# Patient Record
Sex: FEMALE | Race: WHITE | NOT HISPANIC OR LATINO | Employment: OTHER | URBAN - METROPOLITAN AREA
[De-identification: names, ages, dates, MRNs, and addresses within clinical notes are randomized per-mention and may not be internally consistent; named-entity substitution may affect disease eponyms.]

---

## 2017-10-16 ENCOUNTER — OFFICE VISIT (OUTPATIENT)
Dept: MEDICAL GROUP | Facility: MEDICAL CENTER | Age: 77
End: 2017-10-16
Payer: MEDICARE

## 2017-10-16 VITALS
BODY MASS INDEX: 31.65 KG/M2 | RESPIRATION RATE: 18 BRPM | HEIGHT: 62 IN | WEIGHT: 172 LBS | SYSTOLIC BLOOD PRESSURE: 118 MMHG | DIASTOLIC BLOOD PRESSURE: 70 MMHG | OXYGEN SATURATION: 95 % | TEMPERATURE: 97.8 F | HEART RATE: 72 BPM

## 2017-10-16 DIAGNOSIS — J40 BRONCHITIS: Primary | ICD-10-CM

## 2017-10-16 DIAGNOSIS — I10 ESSENTIAL HYPERTENSION: ICD-10-CM

## 2017-10-16 DIAGNOSIS — Z00.00 HEALTHCARE MAINTENANCE: ICD-10-CM

## 2017-10-16 DIAGNOSIS — Z23 NEED FOR VACCINATION: ICD-10-CM

## 2017-10-16 DIAGNOSIS — Z12.11 SCREENING FOR COLON CANCER: ICD-10-CM

## 2017-10-16 PROCEDURE — 99203 OFFICE O/P NEW LOW 30 MIN: CPT | Mod: 25 | Performed by: FAMILY MEDICINE

## 2017-10-16 PROCEDURE — G0009 ADMIN PNEUMOCOCCAL VACCINE: HCPCS | Performed by: FAMILY MEDICINE

## 2017-10-16 PROCEDURE — 90670 PCV13 VACCINE IM: CPT | Performed by: FAMILY MEDICINE

## 2017-10-16 RX ORDER — PREDNISONE 10 MG/1
10 TABLET ORAL DAILY
Qty: 10 TAB | Refills: 0 | Status: SHIPPED | OUTPATIENT
Start: 2017-10-16 | End: 2017-10-16 | Stop reason: SDUPTHER

## 2017-10-16 RX ORDER — ALBUTEROL SULFATE 90 UG/1
2 AEROSOL, METERED RESPIRATORY (INHALATION) EVERY 6 HOURS PRN
Qty: 8.5 G | Refills: 0 | Status: SHIPPED
Start: 2017-10-16 | End: 2019-12-11

## 2017-10-16 RX ORDER — INFLUENZA A VIRUS A/MICHIGAN/45/2015 X-275 (H1N1) ANTIGEN (FORMALDEHYDE INACTIVATED), INFLUENZA A VIRUS A/SINGAPORE/INFIMH-16-0019/2016 IVR-186 (H3N2) ANTIGEN (FORMALDEHYDE INACTIVATED), AND INFLUENZA B VIRUS B/MARYLAND/15/2016 BX-69A (A B/COLORADO/6/2017-LIKE VIRUS) ANTIGEN (FORMALDEHYDE INACTIVATED) 60; 60; 60 UG/.5ML; UG/.5ML; UG/.5ML
INJECTION, SUSPENSION INTRAMUSCULAR
Refills: 0 | COMMUNITY
Start: 2017-09-17 | End: 2019-12-11

## 2017-10-16 RX ORDER — AZITHROMYCIN 250 MG/1
TABLET, FILM COATED ORAL
Qty: 6 TAB | Refills: 0 | Status: SHIPPED | OUTPATIENT
Start: 2017-10-16 | End: 2019-12-11

## 2017-10-16 RX ORDER — ATENOLOL 50 MG/1
50 TABLET ORAL
Refills: 6 | COMMUNITY
Start: 2017-09-13 | End: 2017-10-16 | Stop reason: SDUPTHER

## 2017-10-16 RX ORDER — PREDNISONE 10 MG/1
10 TABLET ORAL DAILY
Qty: 10 TAB | Refills: 0 | Status: SHIPPED | OUTPATIENT
Start: 2017-10-16 | End: 2017-10-26

## 2017-10-16 RX ORDER — ATENOLOL 50 MG/1
50 TABLET ORAL
Qty: 90 TAB | Refills: 3 | Status: SHIPPED
Start: 2017-10-16 | End: 2019-12-11

## 2017-10-16 RX ORDER — CODEINE PHOSPHATE AND GUAIFENESIN 10; 100 MG/5ML; MG/5ML
5 SOLUTION ORAL EVERY 4 HOURS PRN
Qty: 420 ML | Refills: 0 | Status: SHIPPED | OUTPATIENT
Start: 2017-10-16 | End: 2019-12-11

## 2017-10-16 ASSESSMENT — PATIENT HEALTH QUESTIONNAIRE - PHQ9: CLINICAL INTERPRETATION OF PHQ2 SCORE: 0

## 2017-10-16 NOTE — ASSESSMENT & PLAN NOTE
Very well controlled  I encourage her to return to clinic q6 mo with labs, minimum 1x per year   Continue BB

## 2017-10-16 NOTE — ASSESSMENT & PLAN NOTE
No e/o bacterial infection on examination today.  I would like her to start with low dose oral prednisone, cough suppressant, SATURNINO, hydration and humidifier.  SNAP prescription provided.  Return if symptoms worsen, fail to improve or if new symptoms develop.

## 2017-10-16 NOTE — ASSESSMENT & PLAN NOTE
"She declines mammo  She will do fit testing for colon cancer screening  She believes she had pneumonia 23 at age 63 but will do 13 today  She is up to date bone density scan states she had one done recently and her bone density was \"fantastic!\" declines futher bone density screening at this time  States she would like to be \"minimal' with her healthcare  Had flu done this year  "

## 2017-10-16 NOTE — PROGRESS NOTES
This medical record contains text that has been entered with the assistance of computer voice recognition and dictation software.  Therefore, it may contain unintended errors in text, spelling, punctuation, or grammar        Chief Complaint   Patient presents with   • Establish Care     patient states she is here to establish care   • URI     patient states she has been sick x3 weeks       Elvie Otero is a 77 y.o. 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  She has relatives in [facility], her mother was 95 when she passed away, her aunt is 98 and still alive.      She developed cough congestion 7-10 days ago, congestion has improved but the dry naggy cough has persisted.  Cough is worse at night and improves during the day.  Feels that cough is involuntary , triggered by laughing or deep breathing.    No fever/chill  No h/o reactive airway disease  No sinus pain   No headache  No eye changes  No shortness of breath  No swelling   No rash    Current Outpatient Prescriptions   Medication Sig Dispense Refill   • guaifenesin-codeine (ROBITUSSIN AC) Solution oral solution Take 5 mL by mouth every four hours as needed for Cough. 420 mL 0   • albuterol (PROAIR HFA) 108 (90 Base) MCG/ACT Aero Soln inhalation aerosol Inhale 2 Puffs by mouth every 6 hours as  "needed for Shortness of Breath. 8.5 g 0   • azithromycin (ZITHROMAX) 250 MG Tab Take 2 tabs on day one, then 1 tab on days 2-5 6 Tab 0   • predniSONE (DELTASONE) 10 MG Tab Take 1 Tab by mouth every day for 10 doses. 10 Tab 0   • atenolol (TENORMIN) 50 MG Tab Take 1 Tab by mouth every day. 90 Tab 3   • FLUZONE HIGH-DOSE 0.5 ML Suspension Prefilled Syringe injection ADM 0.5ML IM UTD  0     No current facility-administered medications for this visit.      Patient Active Problem List    Diagnosis Date Noted   • Bronchitis 10/16/2017   • Essential hypertension 10/16/2017   • Healthcare maintenance 10/16/2017     Past Surgical History:   Procedure Laterality Date   • RHYTIDECTOMY  10/15/2012    Performed by Nelsy Sun M.D. at SURGERY [facility]   • BLEPHAROPLASTY  10/15/2012    Performed by Nelsy Sun M.D. at SURGERY [facility]   • CHOLECYSTECTOMY  2004   • HYSTERECTOMY LAPAROSCOPY  1975      Social History   Substance Use Topics   • Smoking status: Never Smoker   • Smokeless tobacco: Never Used   • Alcohol use Yes      Comment: 1 per week     History reviewed. No pertinent family history.        ROS      All other systems reviewed and are negative     Objective:     Blood pressure 118/70, pulse 72, temperature 36.6 °C (97.8 °F), resp. rate 18, height 1.575 m (5' 2\"), weight 78 kg (172 lb), SpO2 95 %. Body mass index is 31.46 kg/m².  Physical Exam:    GEN: alert and oriented, no apparent distress  HEENT: NCAT  EYES: PERRLA, EOMIT, sclera white  NOSE: boggy inf nasal turbinates with clear nasal discharge.    EARS: TM's normal bilaterally, no redness effusion.  NECK: shotty LAD, supple, no rigidity  THROAT: swelling and redness of the pharyngeal arches no pus or exudates, uvula midline   LUNGS: clear to ascultation bilaterally, no e/o consolidation.  Speaking in full sentences, not in respiratory distress.   HEART: RRR, no MRG  MSK: limbs warm and well perfused            Assessment and " "Plan:   The following treatment plan was discussed        Problem List Items Addressed This Visit     Bronchitis - Primary     No e/o bacterial infection on examination today.  I would like her to start with low dose oral prednisone, cough suppressant, SATURNINO, hydration and humidifier.  SNAP prescription provided.  Return if symptoms worsen, fail to improve or if new symptoms develop.                Essential hypertension     Very well controlled  I encourage her to return to clinic q6 mo with labs, minimum 1x per year   Continue BB           Relevant Medications    atenolol (TENORMIN) 50 MG Tab    Other Relevant Orders    BASIC METABOLIC PANEL    LDL, DIRECT    HDL CHOLESTEROL    CBC WITHOUT DIFFERENTIAL    Healthcare maintenance     She declines mammo  She will do fit testing for colon cancer screening  She believes she had pneumonia 23 at age 63 but will do 13 today  She is up to date bone density scan states she had one done recently and her bone density was \"fantastic!\" declines futher bone density screening at this time  States she would like to be \"minimal' with her healthcare  Had flu done this year           Other Visit Diagnoses     Need for vaccination        Relevant Orders    Pneumococcal Conjugate Vaccine 13-Valent <4yo IM    Screening for colon cancer        Relevant Orders    OCCULT BLOOD FECES IMMUNOASSAY            HEALTH MAINTENANCE: up to date with todays pneumonia 13.      Instructed to follow up if symptoms worsen or fail to improve, ER/UC precautions discussed as well    Followup: 6 mo     Nicole Painting MD  [facility], Family Medicine   [address] [place] [address]   [address] [place] [id number]  Phone: [phone]               "

## 2019-04-09 ENCOUNTER — OFFICE VISIT (OUTPATIENT)
Dept: MEDICAL GROUP | Facility: MEDICAL CENTER | Age: 79
End: 2019-04-09
Payer: MEDICARE

## 2019-04-09 VITALS
DIASTOLIC BLOOD PRESSURE: 74 MMHG | OXYGEN SATURATION: 93 % | TEMPERATURE: 98.3 F | HEART RATE: 59 BPM | SYSTOLIC BLOOD PRESSURE: 130 MMHG | BODY MASS INDEX: 32.65 KG/M2 | HEIGHT: 62 IN | RESPIRATION RATE: 14 BRPM | WEIGHT: 177.4 LBS

## 2019-04-09 DIAGNOSIS — Z91.09 ENVIRONMENTAL ALLERGIES: ICD-10-CM

## 2019-04-09 PROCEDURE — 99214 OFFICE O/P EST MOD 30 MIN: CPT | Performed by: FAMILY MEDICINE

## 2019-04-09 RX ORDER — OLOPATADINE HYDROCHLORIDE 2 MG/ML
1 SOLUTION/ DROPS OPHTHALMIC DAILY
Qty: 1 BOTTLE | Refills: 2 | Status: SHIPPED
Start: 2019-04-09 | End: 2019-12-11

## 2019-04-09 RX ORDER — ERGOCALCIFEROL 1.25 MG/1
1 CAPSULE ORAL DAILY
Refills: 4 | COMMUNITY
Start: 2019-04-03 | End: 2019-12-11

## 2019-04-09 RX ORDER — FLUTICASONE PROPIONATE 50 MCG
1 SPRAY, SUSPENSION (ML) NASAL 2 TIMES DAILY
Qty: 1 BOTTLE | Refills: 2 | Status: SHIPPED
Start: 2019-04-09 | End: 2019-12-11

## 2019-04-09 NOTE — PROGRESS NOTES
CC:The encounter diagnosis was Environmental allergies.    HISTORY OF PRESENT ILLNESS: Patient is a 78 y.o. female established patient who presents today with concern regarding possible allergies.     Environmental allergies  The patient reports watery/itchy/red and swollen eyes for the past 3 weeks. Also with a cough which is improving. She did have congestion and post nasal drainage which has improved significantly. She has been taking mucinex and allergy medications over the counter which are a little helpful. Denies any h/o allergies. Denies any h/o eczema or asthma. Denies any change in detergent, soap, makeup etc. States this started when she was travelling to [place]. Denies any fever, chills or sore throat.       Patient Active Problem List    Diagnosis Date Noted   • Environmental allergies 04/09/2019   • Bronchitis 10/16/2017   • Essential hypertension 10/16/2017   • Healthcare maintenance 10/16/2017      Allergies:Sulfa drugs    Current Outpatient Prescriptions   Medication Sig Dispense Refill   • Olopatadine HCl 0.2 % Solution 1 Drop by Ophthalmic route every day. 1 Bottle 2   • fluticasone (FLONASE) 50 MCG/ACT nasal spray Spray 1 Spray in nose 2 times a day. 1 Bottle 2   • atenolol (TENORMIN) 50 MG Tab Take 1 Tab by mouth every day. 90 Tab 3   • FLUZONE HIGH-DOSE 0.5 ML Suspension Prefilled Syringe injection ADM 0.5ML IM UTD  0   • guaifenesin-codeine (ROBITUSSIN AC) Solution oral solution Take 5 mL by mouth every four hours as needed for Cough. 420 mL 0   • albuterol (PROAIR HFA) 108 (90 Base) MCG/ACT Aero Soln inhalation aerosol Inhale 2 Puffs by mouth every 6 hours as needed for Shortness of Breath. 8.5 g 0   • azithromycin (ZITHROMAX) 250 MG Tab Take 2 tabs on day one, then 1 tab on days 2-5 6 Tab 0     No current facility-administered medications for this visit.        Social History   Substance Use Topics   • Smoking status: Never Smoker   • Smokeless tobacco: Never Used   • Alcohol use Yes       "Comment: 1 per week     Social History     Social History Narrative   • No narrative on file       History reviewed. No pertinent family history.             Exam:    /74 (BP Location: Left arm, Patient Position: Sitting, BP Cuff Size: Adult)   Pulse (!) 59   Temp 36.8 °C (98.3 °F) (Temporal)   Resp 14   Ht 1.575 m (5' 2\")   Wt 80.5 kg (177 lb 6.4 oz)   SpO2 93%  Body mass index is 32.45 kg/m².      General: Normal appearing. No distress.  HEAD: NCAT  EYES: conjunctiva mildly injected, lids without ptosis, pupils equal  and reactive to light. B/L lids with erythema upper and lower lids, dry as well.  EARS: ears normal shape and contour, canals are clear bilaterally, TMs clear  MOUTH: oropharynx is without erythema, edema or exudates.   Neck: Supple without masses. Thyroid is not enlarged. Normal ROM  Pulmonary: Clear to ausculation.  Normal effort. No rales, ronchi, or wheezing.  Cardiovascular: Regular rate and rhythm, no murmur. No LE edema  Neurologic: Grossly normal, no focal deficits  Lymph: No cervical or supraclavicular lymph nodes are palpable  Skin: Warm and dry.  No obvious lesions.  Musculoskeletal: Normal gait and station.   Psych: Normal mood and affect. Alert and oriented x3. Judgment and insight is normal.       Assessment/Plan:  1. Environmental allergies  New problem. Rx given for flonase and pataday eye drops. Unclear the cause of the patient's allergies. The appearance of the lids are almost eczematous. Advised if she does not note improvement with the pataday drops we may need to use a non-steroidal cream on the lids. Advised to return to clinic if no improvement.    - Olopatadine HCl 0.2 % Solution; 1 Drop by Ophthalmic route every day.  Dispense: 1 Bottle; Refill: 2  - fluticasone (FLONASE) 50 MCG/ACT nasal spray; Spray 1 Spray in nose 2 times a day.  Dispense: 1 Bottle; Refill: 2        Please note that this dictation was created using voice recognition software. I have made every " reasonable attempt to correct obvious errors, but I expect that there are errors of grammar and possibly content that I did not discover before finalizing the note.

## 2019-04-09 NOTE — ASSESSMENT & PLAN NOTE
The patient reports watery/itchy/red and swollen eyes per the past 3 weeks. Also with a cough which is improving. She did have congestion and post nasal drainage which has improved significantly. She has been taking mucinex and allergy medications over the counter which are a little helpful. Denies any h/o allergies. Denies any h/o eczema or asthma. Denies any change in detergent, soap, makeup etc. States this started when she was travelling to [place]. Denies any fever, chills or sore throat.

## 2019-06-26 ENCOUNTER — HOSPITAL ENCOUNTER (OUTPATIENT)
Dept: RADIOLOGY | Facility: MEDICAL CENTER | Age: 79
End: 2019-06-26
Attending: FAMILY MEDICINE
Payer: MEDICARE

## 2019-06-26 DIAGNOSIS — R06.02 SHORTNESS OF BREATH: ICD-10-CM

## 2019-06-26 DIAGNOSIS — R07.89 OTHER CHEST PAIN: ICD-10-CM

## 2019-06-26 PROCEDURE — 700117 HCHG RX CONTRAST REV CODE 255: Performed by: FAMILY MEDICINE

## 2019-06-26 PROCEDURE — 71275 CT ANGIOGRAPHY CHEST: CPT

## 2019-06-26 RX ADMIN — IOHEXOL 75 ML: 350 INJECTION, SOLUTION INTRAVENOUS at 11:30

## 2019-10-23 ENCOUNTER — APPOINTMENT (RX ONLY)
Dept: URBAN - METROPOLITAN AREA CLINIC 4 | Facility: CLINIC | Age: 79
Setting detail: DERMATOLOGY
End: 2019-10-23

## 2019-10-23 DIAGNOSIS — D69.2 OTHER NONTHROMBOCYTOPENIC PURPURA: ICD-10-CM

## 2019-10-23 PROCEDURE — ? COUNSELING

## 2019-10-23 PROCEDURE — ? ADDITIONAL NOTES

## 2019-10-23 PROCEDURE — 99202 OFFICE O/P NEW SF 15 MIN: CPT

## 2019-10-23 ASSESSMENT — LOCATION ZONE DERM
LOCATION ZONE: HAND
LOCATION ZONE: ARM

## 2019-10-23 ASSESSMENT — LOCATION SIMPLE DESCRIPTION DERM
LOCATION SIMPLE: LEFT HAND
LOCATION SIMPLE: LEFT WRIST
LOCATION SIMPLE: RIGHT WRIST
LOCATION SIMPLE: RIGHT HAND

## 2019-10-23 ASSESSMENT — LOCATION DETAILED DESCRIPTION DERM
LOCATION DETAILED: RIGHT DORSAL WRIST
LOCATION DETAILED: RIGHT ULNAR DORSAL HAND
LOCATION DETAILED: LEFT RADIAL DORSAL HAND
LOCATION DETAILED: LEFT DORSAL WRIST

## 2019-10-23 NOTE — PROCEDURE: ADDITIONAL NOTES
Detail Level: Simple
Additional Notes: Reassurance provided, handout provided.\\nNot taking any blood thinners - including vitamin E, fish oil, ibuprofen\\nRecommended vitamin C supplement daily 1000mg - this has been described to help PPPD\\nRecommend covering open wounds with non stick gauze and a wrap

## 2019-12-11 ENCOUNTER — APPOINTMENT (OUTPATIENT)
Dept: RADIOLOGY | Facility: MEDICAL CENTER | Age: 79
DRG: 246 | End: 2019-12-11
Attending: EMERGENCY MEDICINE
Payer: MEDICARE

## 2019-12-11 ENCOUNTER — APPOINTMENT (OUTPATIENT)
Dept: CARDIOLOGY | Facility: MEDICAL CENTER | Age: 79
DRG: 246 | End: 2019-12-11
Attending: EMERGENCY MEDICINE
Payer: MEDICARE

## 2019-12-11 ENCOUNTER — APPOINTMENT (OUTPATIENT)
Dept: CARDIOLOGY | Facility: MEDICAL CENTER | Age: 79
DRG: 246 | End: 2019-12-11
Attending: INTERNAL MEDICINE
Payer: MEDICARE

## 2019-12-11 ENCOUNTER — HOSPITAL ENCOUNTER (INPATIENT)
Facility: MEDICAL CENTER | Age: 79
LOS: 6 days | DRG: 246 | End: 2019-12-17
Attending: EMERGENCY MEDICINE | Admitting: HOSPITALIST
Payer: MEDICARE

## 2019-12-11 DIAGNOSIS — I21.19 ACUTE ST ELEVATION MYOCARDIAL INFARCTION (STEMI) OF INFERIOR WALL (HCC): ICD-10-CM

## 2019-12-11 PROBLEM — I44.1 SECOND DEGREE AV BLOCK, MOBITZ TYPE I: Status: ACTIVE | Noted: 2019-12-11

## 2019-12-11 PROBLEM — I71.010 ASCENDING AORTIC DISSECTION (HCC): Status: ACTIVE | Noted: 2019-12-11

## 2019-12-11 LAB
ABO GROUP BLD: NORMAL
ALBUMIN SERPL BCP-MCNC: 4 G/DL (ref 3.2–4.9)
ALBUMIN/GLOB SERPL: 1.7 G/DL
ALP SERPL-CCNC: 71 U/L (ref 30–99)
ALT SERPL-CCNC: 7 U/L (ref 2–50)
ANION GAP SERPL CALC-SCNC: 10 MMOL/L (ref 0–11.9)
APTT PPP: 24.9 SEC (ref 24.7–36)
AST SERPL-CCNC: 10 U/L (ref 12–45)
BASOPHILS # BLD AUTO: 0.3 % (ref 0–1.8)
BASOPHILS # BLD: 0.04 K/UL (ref 0–0.12)
BILIRUB SERPL-MCNC: 0.5 MG/DL (ref 0.1–1.5)
BLD GP AB SCN SERPL QL: NORMAL
BUN SERPL-MCNC: 26 MG/DL (ref 8–22)
CALCIUM SERPL-MCNC: 9.1 MG/DL (ref 8.5–10.5)
CHLORIDE SERPL-SCNC: 105 MMOL/L (ref 96–112)
CO2 SERPL-SCNC: 24 MMOL/L (ref 20–33)
CREAT SERPL-MCNC: 1.38 MG/DL (ref 0.5–1.4)
EKG IMPRESSION: NORMAL
EKG IMPRESSION: NORMAL
EOSINOPHIL # BLD AUTO: 0.14 K/UL (ref 0–0.51)
EOSINOPHIL NFR BLD: 1.1 % (ref 0–6.9)
ERYTHROCYTE [DISTWIDTH] IN BLOOD BY AUTOMATED COUNT: 41.5 FL (ref 35.9–50)
GLOBULIN SER CALC-MCNC: 2.4 G/DL (ref 1.9–3.5)
GLUCOSE SERPL-MCNC: 137 MG/DL (ref 65–99)
HCT VFR BLD AUTO: 41.6 % (ref 37–47)
HGB BLD-MCNC: 14.3 G/DL (ref 12–16)
IMM GRANULOCYTES # BLD AUTO: 0.08 K/UL (ref 0–0.11)
IMM GRANULOCYTES NFR BLD AUTO: 0.6 % (ref 0–0.9)
INR PPP: 1.04 (ref 0.87–1.13)
LIPASE SERPL-CCNC: 48 U/L (ref 11–82)
LV EJECT FRACT  99904: 60
LV EJECT FRACT MOD 2C 99903: 50.87
LV EJECT FRACT MOD 4C 99902: 50.77
LV EJECT FRACT MOD BP 99901: 52.38
LYMPHOCYTES # BLD AUTO: 1.64 K/UL (ref 1–4.8)
LYMPHOCYTES NFR BLD: 13.2 % (ref 22–41)
MCH RBC QN AUTO: 31.1 PG (ref 27–33)
MCHC RBC AUTO-ENTMCNC: 34.4 G/DL (ref 33.6–35)
MCV RBC AUTO: 90.4 FL (ref 81.4–97.8)
MONOCYTES # BLD AUTO: 0.88 K/UL (ref 0–0.85)
MONOCYTES NFR BLD AUTO: 7.1 % (ref 0–13.4)
NEUTROPHILS # BLD AUTO: 9.63 K/UL (ref 2–7.15)
NEUTROPHILS NFR BLD: 77.7 % (ref 44–72)
NRBC # BLD AUTO: 0 K/UL
NRBC BLD-RTO: 0 /100 WBC
NT-PROBNP SERPL IA-MCNC: 550 PG/ML (ref 0–125)
PLATELET # BLD AUTO: 244 K/UL (ref 164–446)
PMV BLD AUTO: 9.1 FL (ref 9–12.9)
POTASSIUM SERPL-SCNC: 3.9 MMOL/L (ref 3.6–5.5)
PROT SERPL-MCNC: 6.4 G/DL (ref 6–8.2)
PROTHROMBIN TIME: 13.8 SEC (ref 12–14.6)
RBC # BLD AUTO: 4.6 M/UL (ref 4.2–5.4)
RH BLD: NORMAL
SODIUM SERPL-SCNC: 139 MMOL/L (ref 135–145)
TROPONIN T SERPL-MCNC: 21 NG/L (ref 6–19)
WBC # BLD AUTO: 12.4 K/UL (ref 4.8–10.8)

## 2019-12-11 PROCEDURE — 027034Z DILATION OF CORONARY ARTERY, ONE ARTERY WITH DRUG-ELUTING INTRALUMINAL DEVICE, PERCUTANEOUS APPROACH: ICD-10-PCS | Performed by: INTERNAL MEDICINE

## 2019-12-11 PROCEDURE — 700105 HCHG RX REV CODE 258: Performed by: INTERNAL MEDICINE

## 2019-12-11 PROCEDURE — 700101 HCHG RX REV CODE 250

## 2019-12-11 PROCEDURE — 4A023N7 MEASUREMENT OF CARDIAC SAMPLING AND PRESSURE, LEFT HEART, PERCUTANEOUS APPROACH: ICD-10-PCS | Performed by: INTERNAL MEDICINE

## 2019-12-11 PROCEDURE — 700105 HCHG RX REV CODE 258

## 2019-12-11 PROCEDURE — 93458 L HRT ARTERY/VENTRICLE ANGIO: CPT | Mod: 26,59 | Performed by: INTERNAL MEDICINE

## 2019-12-11 PROCEDURE — 02703D6 DILATION OF CORONARY ARTERY, ONE ARTERY, BIFURCATION, WITH INTRALUMINAL DEVICE, PERCUTANEOUS APPROACH: ICD-10-PCS | Performed by: INTERNAL MEDICINE

## 2019-12-11 PROCEDURE — 700111 HCHG RX REV CODE 636 W/ 250 OVERRIDE (IP): Performed by: INTERNAL MEDICINE

## 2019-12-11 PROCEDURE — 93005 ELECTROCARDIOGRAM TRACING: CPT

## 2019-12-11 PROCEDURE — 86850 RBC ANTIBODY SCREEN: CPT

## 2019-12-11 PROCEDURE — 85610 PROTHROMBIN TIME: CPT

## 2019-12-11 PROCEDURE — 93458 L HRT ARTERY/VENTRICLE ANGIO: CPT | Mod: XU

## 2019-12-11 PROCEDURE — 92941 PRQ TRLML REVSC TOT OCCL AMI: CPT | Mod: RC | Performed by: INTERNAL MEDICINE

## 2019-12-11 PROCEDURE — 99223 1ST HOSP IP/OBS HIGH 75: CPT | Mod: 25 | Performed by: INTERNAL MEDICINE

## 2019-12-11 PROCEDURE — B2111ZZ FLUOROSCOPY OF MULTIPLE CORONARY ARTERIES USING LOW OSMOLAR CONTRAST: ICD-10-PCS | Performed by: INTERNAL MEDICINE

## 2019-12-11 PROCEDURE — 71045 X-RAY EXAM CHEST 1 VIEW: CPT

## 2019-12-11 PROCEDURE — 770022 HCHG ROOM/CARE - ICU (200)

## 2019-12-11 PROCEDURE — 84484 ASSAY OF TROPONIN QUANT: CPT

## 2019-12-11 PROCEDURE — 83880 ASSAY OF NATRIURETIC PEPTIDE: CPT

## 2019-12-11 PROCEDURE — 700117 HCHG RX CONTRAST REV CODE 255: Performed by: INTERNAL MEDICINE

## 2019-12-11 PROCEDURE — 99153 MOD SED SAME PHYS/QHP EA: CPT

## 2019-12-11 PROCEDURE — 80053 COMPREHEN METABOLIC PANEL: CPT

## 2019-12-11 PROCEDURE — 93005 ELECTROCARDIOGRAM TRACING: CPT | Performed by: EMERGENCY MEDICINE

## 2019-12-11 PROCEDURE — 700111 HCHG RX REV CODE 636 W/ 250 OVERRIDE (IP)

## 2019-12-11 PROCEDURE — 99152 MOD SED SAME PHYS/QHP 5/>YRS: CPT | Performed by: INTERNAL MEDICINE

## 2019-12-11 PROCEDURE — 33210 INSERT ELECTRD/PM CATH SNGL: CPT

## 2019-12-11 PROCEDURE — 93005 ELECTROCARDIOGRAM TRACING: CPT | Performed by: INTERNAL MEDICINE

## 2019-12-11 PROCEDURE — 85730 THROMBOPLASTIN TIME PARTIAL: CPT

## 2019-12-11 PROCEDURE — 85025 COMPLETE CBC W/AUTO DIFF WBC: CPT

## 2019-12-11 PROCEDURE — 93308 TTE F-UP OR LMTD: CPT | Mod: 26 | Performed by: INTERNAL MEDICINE

## 2019-12-11 PROCEDURE — 92920 PRQ TRLUML C ANGIOP 1ART&/BR: CPT | Mod: RC

## 2019-12-11 PROCEDURE — 99285 EMERGENCY DEPT VISIT HI MDM: CPT

## 2019-12-11 PROCEDURE — 5A1223Z PERFORMANCE OF CARDIAC PACING, CONTINUOUS: ICD-10-PCS | Performed by: INTERNAL MEDICINE

## 2019-12-11 PROCEDURE — 86900 BLOOD TYPING SEROLOGIC ABO: CPT

## 2019-12-11 PROCEDURE — 93325 DOPPLER ECHO COLOR FLOW MAPG: CPT

## 2019-12-11 PROCEDURE — 86901 BLOOD TYPING SEROLOGIC RH(D): CPT

## 2019-12-11 PROCEDURE — 83690 ASSAY OF LIPASE: CPT

## 2019-12-11 PROCEDURE — B2151ZZ FLUOROSCOPY OF LEFT HEART USING LOW OSMOLAR CONTRAST: ICD-10-PCS | Performed by: INTERNAL MEDICINE

## 2019-12-11 RX ORDER — NITROGLYCERIN 20 MG/100ML
0-200 INJECTION INTRAVENOUS CONTINUOUS
Status: DISCONTINUED | OUTPATIENT
Start: 2019-12-11 | End: 2019-12-13

## 2019-12-11 RX ORDER — ONDANSETRON 2 MG/ML
4 INJECTION INTRAMUSCULAR; INTRAVENOUS EVERY 4 HOURS PRN
Status: DISCONTINUED | OUTPATIENT
Start: 2019-12-11 | End: 2019-12-17 | Stop reason: HOSPADM

## 2019-12-11 RX ORDER — LIDOCAINE HYDROCHLORIDE 20 MG/ML
INJECTION, SOLUTION INFILTRATION; PERINEURAL
Status: COMPLETED
Start: 2019-12-11 | End: 2019-12-11

## 2019-12-11 RX ORDER — ATENOLOL 50 MG/1
50 TABLET ORAL DAILY
COMMUNITY
End: 2019-12-27 | Stop reason: SDUPTHER

## 2019-12-11 RX ORDER — SODIUM CHLORIDE 9 MG/ML
INJECTION, SOLUTION INTRAVENOUS
Status: COMPLETED
Start: 2019-12-11 | End: 2019-12-11

## 2019-12-11 RX ORDER — POLYETHYLENE GLYCOL 3350 17 G/17G
1 POWDER, FOR SOLUTION ORAL
Status: DISCONTINUED | OUTPATIENT
Start: 2019-12-11 | End: 2019-12-17 | Stop reason: HOSPADM

## 2019-12-11 RX ORDER — BISACODYL 10 MG
10 SUPPOSITORY, RECTAL RECTAL
Status: DISCONTINUED | OUTPATIENT
Start: 2019-12-11 | End: 2019-12-17 | Stop reason: HOSPADM

## 2019-12-11 RX ORDER — LABETALOL HYDROCHLORIDE 5 MG/ML
10 INJECTION, SOLUTION INTRAVENOUS EVERY 4 HOURS PRN
Status: DISCONTINUED | OUTPATIENT
Start: 2019-12-11 | End: 2019-12-11

## 2019-12-11 RX ORDER — VERAPAMIL HYDROCHLORIDE 2.5 MG/ML
INJECTION, SOLUTION INTRAVENOUS
Status: COMPLETED
Start: 2019-12-11 | End: 2019-12-11

## 2019-12-11 RX ORDER — EPTIFIBATIDE 2 MG/ML
INJECTION, SOLUTION INTRAVENOUS
Status: COMPLETED
Start: 2019-12-11 | End: 2019-12-11

## 2019-12-11 RX ORDER — ASPIRIN 81 MG/1
TABLET, CHEWABLE ORAL
Status: DISPENSED
Start: 2019-12-11 | End: 2019-12-12

## 2019-12-11 RX ORDER — NITROGLYCERIN 20 MG/100ML
INJECTION INTRAVENOUS
Status: COMPLETED
Start: 2019-12-11 | End: 2019-12-11

## 2019-12-11 RX ORDER — SODIUM CHLORIDE 9 MG/ML
INJECTION, SOLUTION INTRAVENOUS CONTINUOUS
Status: DISCONTINUED | OUTPATIENT
Start: 2019-12-11 | End: 2019-12-13

## 2019-12-11 RX ORDER — MIDAZOLAM HYDROCHLORIDE 1 MG/ML
INJECTION INTRAMUSCULAR; INTRAVENOUS
Status: COMPLETED
Start: 2019-12-11 | End: 2019-12-11

## 2019-12-11 RX ORDER — EPTIFIBATIDE 0.75 MG/ML
INJECTION, SOLUTION INTRAVENOUS
Status: COMPLETED
Start: 2019-12-11 | End: 2019-12-11

## 2019-12-11 RX ORDER — HEPARIN SODIUM 200 [USP'U]/100ML
INJECTION, SOLUTION INTRAVENOUS
Status: COMPLETED
Start: 2019-12-11 | End: 2019-12-11

## 2019-12-11 RX ORDER — AMOXICILLIN 250 MG
2 CAPSULE ORAL 2 TIMES DAILY
Status: DISCONTINUED | OUTPATIENT
Start: 2019-12-11 | End: 2019-12-17 | Stop reason: HOSPADM

## 2019-12-11 RX ORDER — BIVALIRUDIN 250 MG/5ML
INJECTION, POWDER, LYOPHILIZED, FOR SOLUTION INTRAVENOUS
Status: COMPLETED
Start: 2019-12-11 | End: 2019-12-11

## 2019-12-11 RX ORDER — ONDANSETRON 4 MG/1
4 TABLET, ORALLY DISINTEGRATING ORAL EVERY 4 HOURS PRN
Status: DISCONTINUED | OUTPATIENT
Start: 2019-12-11 | End: 2019-12-17 | Stop reason: HOSPADM

## 2019-12-11 RX ORDER — HEPARIN SODIUM,PORCINE 1000/ML
VIAL (ML) INJECTION
Status: COMPLETED
Start: 2019-12-11 | End: 2019-12-11

## 2019-12-11 RX ADMIN — HEPARIN SODIUM 2000 UNITS: 200 INJECTION, SOLUTION INTRAVENOUS at 17:38

## 2019-12-11 RX ADMIN — VERAPAMIL HYDROCHLORIDE 2.5 MG: 2.5 INJECTION INTRAVENOUS at 17:38

## 2019-12-11 RX ADMIN — NITROGLYCERIN 10 ML: 20 INJECTION INTRAVENOUS at 17:38

## 2019-12-11 RX ADMIN — BIVALIRUDIN 250 MG: 250 INJECTION INTRACAVERNOUS at 19:02

## 2019-12-11 RX ADMIN — HEPARIN SODIUM: 1000 INJECTION, SOLUTION INTRAVENOUS; SUBCUTANEOUS at 17:38

## 2019-12-11 RX ADMIN — NITROGLYCERIN 5 MCG/MIN: 20 INJECTION INTRAVENOUS at 20:00

## 2019-12-11 RX ADMIN — FENTANYL CITRATE 100 MCG: 0.05 INJECTION, SOLUTION INTRAMUSCULAR; INTRAVENOUS at 18:38

## 2019-12-11 RX ADMIN — IOHEXOL 243 ML: 350 INJECTION, SOLUTION INTRAVENOUS at 20:07

## 2019-12-11 RX ADMIN — MIDAZOLAM HYDROCHLORIDE 2 MG: 1 INJECTION, SOLUTION INTRAMUSCULAR; INTRAVENOUS at 19:59

## 2019-12-11 RX ADMIN — EPTIFIBATIDE 13.36 MCG: 2 INJECTION, SOLUTION INTRAVENOUS at 19:29

## 2019-12-11 RX ADMIN — EPTIFIBATIDE 13.3 MG: 2 INJECTION, SOLUTION INTRAVENOUS at 19:28

## 2019-12-11 RX ADMIN — NITROGLYCERIN 5 MCG/MIN: 20 INJECTION INTRAVENOUS at 21:22

## 2019-12-11 RX ADMIN — LIDOCAINE HYDROCHLORIDE: 20 INJECTION, SOLUTION INFILTRATION; PERINEURAL at 17:38

## 2019-12-11 RX ADMIN — SODIUM CHLORIDE: 9 INJECTION, SOLUTION INTRAVENOUS at 23:26

## 2019-12-11 RX ADMIN — EPTIFIBATIDE 12 ML/HR: 75 INJECTION INTRAVENOUS at 19:29

## 2019-12-11 RX ADMIN — BIVALIRUDIN 1.75 MG/KG/HR: 250 INJECTION, POWDER, LYOPHILIZED, FOR SOLUTION INTRAVENOUS at 19:15

## 2019-12-11 RX ADMIN — MIDAZOLAM HYDROCHLORIDE 2 MG: 1 INJECTION, SOLUTION INTRAMUSCULAR; INTRAVENOUS at 18:27

## 2019-12-11 ASSESSMENT — PATIENT HEALTH QUESTIONNAIRE - PHQ9
1. LITTLE INTEREST OR PLEASURE IN DOING THINGS: NOT AT ALL
SUM OF ALL RESPONSES TO PHQ9 QUESTIONS 1 AND 2: 0
2. FEELING DOWN, DEPRESSED, IRRITABLE, OR HOPELESS: NOT AT ALL

## 2019-12-11 ASSESSMENT — LIFESTYLE VARIABLES
HAVE PEOPLE ANNOYED YOU BY CRITICIZING YOUR DRINKING: NO
ALCOHOL_USE: YES
EVER_SMOKED: NEVER
TOTAL SCORE: 0
TOTAL SCORE: 0
EVER FELT BAD OR GUILTY ABOUT YOUR DRINKING: NO
CONSUMPTION TOTAL: INCOMPLETE
HAVE YOU EVER FELT YOU SHOULD CUT DOWN ON YOUR DRINKING: NO
EVER HAD A DRINK FIRST THING IN THE MORNING TO STEADY YOUR NERVES TO GET RID OF A HANGOVER: NO
EVER_SMOKED: NEVER
DOES PATIENT WANT TO STOP DRINKING: NO
TOTAL SCORE: 0

## 2019-12-12 ENCOUNTER — APPOINTMENT (OUTPATIENT)
Dept: RADIOLOGY | Facility: MEDICAL CENTER | Age: 79
DRG: 246 | End: 2019-12-12
Attending: INTERNAL MEDICINE
Payer: MEDICARE

## 2019-12-12 PROBLEM — D72.829 LEUKOCYTOSIS: Status: ACTIVE | Noted: 2019-12-12

## 2019-12-12 PROBLEM — N17.9 AKI (ACUTE KIDNEY INJURY) (HCC): Status: ACTIVE | Noted: 2019-12-12

## 2019-12-12 LAB
ABO + RH BLD: NORMAL
ANION GAP SERPL CALC-SCNC: 4 MMOL/L (ref 0–11.9)
BUN SERPL-MCNC: 23 MG/DL (ref 8–22)
CALCIUM SERPL-MCNC: 8.6 MG/DL (ref 8.5–10.5)
CHLORIDE SERPL-SCNC: 110 MMOL/L (ref 96–112)
CHLORIDE UR-SCNC: 103 MMOL/L
CO2 SERPL-SCNC: 25 MMOL/L (ref 20–33)
CREAT SERPL-MCNC: 0.89 MG/DL (ref 0.5–1.4)
CREAT UR-MCNC: 94.4 MG/DL
EKG IMPRESSION: NORMAL
ERYTHROCYTE [DISTWIDTH] IN BLOOD BY AUTOMATED COUNT: 42.5 FL (ref 35.9–50)
GLUCOSE SERPL-MCNC: 103 MG/DL (ref 65–99)
HCT VFR BLD AUTO: 31.3 % (ref 37–47)
HGB BLD-MCNC: 10.5 G/DL (ref 12–16)
MCH RBC QN AUTO: 30.9 PG (ref 27–33)
MCHC RBC AUTO-ENTMCNC: 33.3 G/DL (ref 33.6–35)
MCV RBC AUTO: 92.6 FL (ref 81.4–97.8)
PLATELET # BLD AUTO: 187 K/UL (ref 164–446)
PMV BLD AUTO: 9.6 FL (ref 9–12.9)
POTASSIUM SERPL-SCNC: 4.1 MMOL/L (ref 3.6–5.5)
POTASSIUM UR-SCNC: 77.6 MMOL/L
PROT UR-MCNC: 16.4 MG/DL (ref 0–15)
RBC # BLD AUTO: 3.37 M/UL (ref 4.2–5.4)
SODIUM SERPL-SCNC: 139 MMOL/L (ref 135–145)
SODIUM UR-SCNC: 73 MMOL/L
WBC # BLD AUTO: 12.8 K/UL (ref 4.8–10.8)

## 2019-12-12 PROCEDURE — 700105 HCHG RX REV CODE 258: Performed by: INTERNAL MEDICINE

## 2019-12-12 PROCEDURE — 99233 SBSQ HOSP IP/OBS HIGH 50: CPT | Performed by: INTERNAL MEDICINE

## 2019-12-12 PROCEDURE — A9270 NON-COVERED ITEM OR SERVICE: HCPCS | Performed by: INTERNAL MEDICINE

## 2019-12-12 PROCEDURE — 700111 HCHG RX REV CODE 636 W/ 250 OVERRIDE (IP): Performed by: INTERNAL MEDICINE

## 2019-12-12 PROCEDURE — 700102 HCHG RX REV CODE 250 W/ 637 OVERRIDE(OP): Performed by: INTERNAL MEDICINE

## 2019-12-12 PROCEDURE — 84156 ASSAY OF PROTEIN URINE: CPT

## 2019-12-12 PROCEDURE — 85027 COMPLETE CBC AUTOMATED: CPT

## 2019-12-12 PROCEDURE — C1729 CATH, DRAINAGE: HCPCS

## 2019-12-12 PROCEDURE — 71275 CT ANGIOGRAPHY CHEST: CPT

## 2019-12-12 PROCEDURE — 82436 ASSAY OF URINE CHLORIDE: CPT

## 2019-12-12 PROCEDURE — 700102 HCHG RX REV CODE 250 W/ 637 OVERRIDE(OP): Performed by: HOSPITALIST

## 2019-12-12 PROCEDURE — A9270 NON-COVERED ITEM OR SERVICE: HCPCS | Performed by: HOSPITALIST

## 2019-12-12 PROCEDURE — 80048 BASIC METABOLIC PNL TOTAL CA: CPT

## 2019-12-12 PROCEDURE — 82570 ASSAY OF URINE CREATININE: CPT

## 2019-12-12 PROCEDURE — 99223 1ST HOSP IP/OBS HIGH 75: CPT | Performed by: HOSPITALIST

## 2019-12-12 PROCEDURE — 93010 ELECTROCARDIOGRAM REPORT: CPT | Performed by: INTERNAL MEDICINE

## 2019-12-12 PROCEDURE — 770022 HCHG ROOM/CARE - ICU (200)

## 2019-12-12 PROCEDURE — 99291 CRITICAL CARE FIRST HOUR: CPT | Performed by: INTERNAL MEDICINE

## 2019-12-12 PROCEDURE — 84133 ASSAY OF URINE POTASSIUM: CPT

## 2019-12-12 PROCEDURE — 32551 INSERTION OF CHEST TUBE: CPT

## 2019-12-12 PROCEDURE — 84300 ASSAY OF URINE SODIUM: CPT

## 2019-12-12 PROCEDURE — 700117 HCHG RX CONTRAST REV CODE 255: Performed by: INTERNAL MEDICINE

## 2019-12-12 RX ORDER — CLOPIDOGREL BISULFATE 75 MG/1
300 TABLET ORAL ONCE
Status: COMPLETED | OUTPATIENT
Start: 2019-12-12 | End: 2019-12-12

## 2019-12-12 RX ORDER — CLOPIDOGREL BISULFATE 75 MG/1
75 TABLET ORAL DAILY
Status: DISCONTINUED | OUTPATIENT
Start: 2019-12-13 | End: 2019-12-17 | Stop reason: HOSPADM

## 2019-12-12 RX ORDER — ATORVASTATIN CALCIUM 80 MG/1
80 TABLET, FILM COATED ORAL EVERY EVENING
Status: DISCONTINUED | OUTPATIENT
Start: 2019-12-12 | End: 2019-12-15

## 2019-12-12 RX ORDER — SODIUM CHLORIDE, SODIUM LACTATE, POTASSIUM CHLORIDE, AND CALCIUM CHLORIDE .6; .31; .03; .02 G/100ML; G/100ML; G/100ML; G/100ML
500 INJECTION, SOLUTION INTRAVENOUS ONCE
Status: COMPLETED | OUTPATIENT
Start: 2019-12-12 | End: 2019-12-12

## 2019-12-12 RX ORDER — HALOPERIDOL 5 MG/ML
INJECTION INTRAMUSCULAR
Status: ACTIVE
Start: 2019-12-12 | End: 2019-12-13

## 2019-12-12 RX ORDER — HEPARIN SODIUM 5000 [USP'U]/100ML
INJECTION, SOLUTION INTRAVENOUS CONTINUOUS
Status: DISCONTINUED | OUTPATIENT
Start: 2019-12-12 | End: 2019-12-12

## 2019-12-12 RX ORDER — HEPARIN SODIUM 5000 [USP'U]/100ML
INJECTION, SOLUTION INTRAVENOUS CONTINUOUS
Status: ACTIVE | OUTPATIENT
Start: 2019-12-12 | End: 2019-12-12

## 2019-12-12 RX ADMIN — SODIUM CHLORIDE: 9 INJECTION, SOLUTION INTRAVENOUS at 09:13

## 2019-12-12 RX ADMIN — SODIUM CHLORIDE, POTASSIUM CHLORIDE, SODIUM LACTATE AND CALCIUM CHLORIDE 500 ML: 600; 310; 30; 20 INJECTION, SOLUTION INTRAVENOUS at 10:17

## 2019-12-12 RX ADMIN — HEPARIN SODIUM 1050 UNITS: 5000 INJECTION, SOLUTION INTRAVENOUS at 09:14

## 2019-12-12 RX ADMIN — IOHEXOL 75 ML: 350 INJECTION, SOLUTION INTRAVENOUS at 11:40

## 2019-12-12 RX ADMIN — CLOPIDOGREL BISULFATE 300 MG: 75 TABLET ORAL at 13:59

## 2019-12-12 RX ADMIN — ASPIRIN 81 MG: 81 TABLET, COATED ORAL at 04:55

## 2019-12-12 RX ADMIN — ATORVASTATIN CALCIUM 80 MG: 80 TABLET, FILM COATED ORAL at 17:17

## 2019-12-12 ASSESSMENT — ENCOUNTER SYMPTOMS
WHEEZING: 0
TREMORS: 0
HEARTBURN: 0
BLURRED VISION: 0
WEAKNESS: 0
STRIDOR: 0
FEVER: 0
BLOOD IN STOOL: 0
FLANK PAIN: 0
DIZZINESS: 0
SORE THROAT: 0
HEMOPTYSIS: 0
POLYDIPSIA: 0
HALLUCINATIONS: 0
HEADACHES: 0
DIAPHORESIS: 0
ORTHOPNEA: 0
PND: 0
EYE PAIN: 0
MYALGIAS: 0
SPUTUM PRODUCTION: 0
NECK PAIN: 0
BACK PAIN: 0
CLAUDICATION: 0
DIARRHEA: 0
TINGLING: 0
FALLS: 0
SPEECH CHANGE: 0
PHOTOPHOBIA: 0
SINUS PAIN: 0
SHORTNESS OF BREATH: 0
FOCAL WEAKNESS: 0
NAUSEA: 0
COUGH: 0
BRUISES/BLEEDS EASILY: 0
DEPRESSION: 0
CHILLS: 0
SENSORY CHANGE: 0
NERVOUS/ANXIOUS: 0
PALPITATIONS: 0
CONSTIPATION: 0
WEIGHT LOSS: 0
ABDOMINAL PAIN: 0
DOUBLE VISION: 0
VOMITING: 0

## 2019-12-12 ASSESSMENT — COPD QUESTIONNAIRES
COPD SCREENING SCORE: 2
DURING THE PAST 4 WEEKS HOW MUCH DID YOU FEEL SHORT OF BREATH: NONE/LITTLE OF THE TIME
DO YOU EVER COUGH UP ANY MUCUS OR PHLEGM?: NO/ONLY WITH OCCASIONAL COLDS OR INFECTIONS
HAVE YOU SMOKED AT LEAST 100 CIGARETTES IN YOUR ENTIRE LIFE: NO/DON'T KNOW

## 2019-12-12 ASSESSMENT — LIFESTYLE VARIABLES
SUBSTANCE_ABUSE: 0
EVER_SMOKED: NEVER

## 2019-12-12 NOTE — ASSESSMENT & PLAN NOTE
Admitted for chest pain and STEMI seen on EKG.   Patient status post drug-eluting stent in the RCA as well as bare-metal stent at the ostium   High intensity statin  Patient did not tolerate beta blockade, is currently on amiodarone with soft blood pressures

## 2019-12-12 NOTE — ASSESSMENT & PLAN NOTE
CT surgery was consulted and recommended observation  Continue to monitor blood pressure   Does likely as result from right coronary disease  Cardiothoracic surgery was evaluating in conjunction with cardiology, no intervention opted

## 2019-12-12 NOTE — ASSESSMENT & PLAN NOTE
2 stents to RCA, distal and ostial  Aortic artery aneurysm present  Goal sbp 100-120, on nitro drip  On statin  Asa am  bivalirudin post cath

## 2019-12-12 NOTE — OP REPORT
Cardiac catheterization report    Procedure date: 12/11/2019    Referring physician: Dr. Emanuel Andersen    Pre-operative Diagnosis:  Acute inferior ST elevation myocardial infarction    Post-operative Diagnosis:   1.  Acute inferior ST elevation microinfarction secondary to total occlusion of distal right coronary artery  2.  Second-degree AV block Mobitz type I  3.  Hypokinesis of the basal to mid inferior wall with preserved overall LV systolic function  4.  Status post stenting of the ostium and distal right coronary artery with 4 x 23 mm bare-metal stent and 2.25 x 18 mm [place] drug eluting stent respectively    Procedure:  1.  Emergent left heart catheterization with left ventriculography  2.  Selective coronary angiography  3.  Placement of transvenous temporary pacemaker   4.  Emergent percutaneous core intervention of the right coronary artery  5.  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   Ej test was carried out on the right hand and was found to be negative.  Right wrist and right groin were then prepped and drapped in sterile fashion.  Versed and fentanyl were used for conscious sedation.  The patient was noted to be in second-degree AV block relatively bradycardic on her arrival.    We therefore decided to first place a transvenous temporary pacemaker.  Lidocaine 2% was used to anesthetize the area.  Using portable ultrasound a 6 [place] sheath was placed in the right femoral vein.  A balloon tipped transvenous pacemaker in place in the right ventricle under fluoroscopic guidance.  A 6 [place]istanian Terumo sheath was then placed in the right radial artery using Seldinger technique.  A 2.5 mg of verapamil, 100 microgram of nitroglycerine and  3000 units of heparin were administered into the radial sheath.  Selective angiography of the right and left coronary artery were performed in multiple views using a 5 [place] TIG catheter.   The TIG catheter was used to enter the left ventricle.    Left ventricular pressure was recorded.   Left ventriculography was performed using 16 cc of contrast injected over 2 seconds.     After reviewing of the diagnostic angiography, it was felt that intervention of the right coronary artery (RCA) was indicated.  Bivalrudin was then started for anticoagulation.  A 6 [place]ian JR 3.5 guide catheter was initially used.   Due to significant tortuosity in the right subclavian and innominate artery, we had some difficulty engaging the right coronary artery and obtaining good guide support.  A 0.014 BMW wire was then advanced into the posterior descending artery PDA).   A 6 [place]ian Guidezilla was then advanced for better guide support.  Aspiration thrombectomy was then performed due to angiographic apparance highly indicative of the presence of large amount of thrombus.  The occlusion occurred around the crux. The posterolateral branch as not visualized initially.  A whisper wire was subsequently advanced into the distal lateral branch (PLB).  The lesion was dilated with a 2x15 mm balloon into both branches.  With potential need of better guide support for bifurcation stenting, we decided to switch guide catheter to an AL 0.75 guide.  Due to significant subclavian and innominate tortuosity we at this point had to switch to a femoral approach.  Using portable ultrasound, a 6 [place]amanian sheath was placed in the right common femoral artery using modified Seldinger technique.  Both guidewires werr reintroduced back into the PDA and PLB.  Angiography at this point suggested that the PDA and PLB to be of equal size but showed better flow into the posterolateral branch partly due to residual thrombus in the mid and distal PDA.  A 2.25 x 18 mm drug  eluting stent was deployed from the distal RCA into the PLB.    The PDA was then recrossed and ostium of the PDA was dilated with 2.5 mm balloon.  Subsequent angiography at this point showed showed the contrast entering into subintimal space of the aortic root up into the ascending aorta as well as dissection into the proximal portion of the right coronary artery.  Upon that, the injection was immediately stopped. Angionmax was also discontinued.  Cardiac surgery consulted was immediately obtained.   Ascending aorta and aortic root were then visualized in multiple views. The contrast staining remained stable during our relatively long close monitoring.   Due to retrograde RCA dissection, we decided to proceed with ostial RCA stenting with 4x23 mm bare metal stent.  Subsequent angiography at this point showed no significant residual stenosis.  The guide catheter was then removed.   The temporary venous pacer, temporary not until she has been sutured to the skin.    Of note the patient did not cooperate as well with the procedure and was moving her arms and leg somewhat throughout. The radial sheath dislodged during the case. Hemostasis was obtained using a Terumo TR wrist band.  The patient was not given a loading dose of antiplatelet due to concern about his aortic dissection.  Stat bedside echocardiography was also performed in the Cath Lab.  It showed no evidence of pericardial effusion.  The patient reported no chest discomfort at the end of the case and was transported to cardiac intensive care unit in stable condition.    I supervised monitoring the patient under moderate conscious sedation beginning at 5:32 PM until the end of the case at 8:22 PM.    Findings:  There is no gradient across aortic valve.  Left ventriculography showed hypokinesis/akinesis of the inferior wall.  Overall LV systolic function is normal .  Ejection fraction is calculated to be 60 %.    Coronary artery disease    This is right  dominant system.    Left main is large and without flow limiting disease.  It bifurcated into left anterior descending and left circumflex artery.     Left anterior descending artery is large caliber vessel.  It gives rises to several small  diagonal branches. Limited images was obtained but there is no signficant disease in the left anterior descending artery or its major branches seen.  The antegrade flow is normal.    Left circumflex artery is large in caliber. Limited image of the LCX was also obtained.  It gives rise to several obtuse marginal branches.  There is no significant disease in the LCX system.  The antegrade flow is normal.    Right coronary artery (RCA) is large caliber.   At debating beginning the case, the RCA was occluded distally at the crux after giving rise to several small acute marginal branches. There was also large amount of thrombus causing subtotal occlusion at the ostium of the posterior descending artery and the posterolateral branch was not visualized.    After intervention, there was no significant residual stenosis in the distal RCA or the posterolateral branch with residual thrombus at the ostium of the PDA with ALEX II flow.      Plans;  Monitor closely in the ICU.  Obtain CT scan of the ascending aorta to follow-up on the aortic dissection.   Consider discontinue temporary pacemaker and start dual antiplatelet therapy tomorrow if remains stable.  Risk factor medication        Chelsea Armstrong M.D.

## 2019-12-12 NOTE — ED PROVIDER NOTES
ED Provider Note    CHIEF COMPLAINT  Chief Complaint   Patient presents with   • Chest Pain        HPI  Elvie Otero is a 79 y.o. female who presents to the ED with complaints of chest pain.  The patient states about an hour ago she had acute onset of initially about an 8-9 over 10 type chest discomfort located centrally with no radiation.  Patient states the pain which is continuous in nature she started feeling very nauseated and because the discomfort she was concerned and called her son who brought her to the emergency department.  Upon arrival patient states the pain is gotten a little bit better but is not really resolved.  Patient denies any overt shortness of breath does describe the pain is now essentially located 8/10 type pain with no radiation describes nausea but no fevers chills vomiting diarrhea or any other symptoms.    REVIEW OF SYSTEMS  See HPI for further details. All other systems are negative.     PAST MEDICAL HISTORY  Past Medical History:   Diagnosis Date   • Heart burn    • Hypertension        FAMILY HISTORY  No family history on file.    SOCIAL HISTORY  Social History     Socioeconomic History   • Marital status:      Spouse name: Not on file   • Number of children: Not on file   • Years of education: Not on file   • Highest education level: Not on file   Occupational History   • Not on file   Social Needs   • Financial resource strain: Not on file   • Food insecurity:     Worry: Not on file     Inability: Not on file   • Transportation needs:     Medical: Not on file     Non-medical: Not on file   Tobacco Use   • Smoking status: Never Smoker   • Smokeless tobacco: Never Used   Substance and Sexual Activity   • Alcohol use: Yes     Comment: 1 per week   • Drug use: No   • Sexual activity: Not on file   Lifestyle   • Physical activity:     Days per week: Not on file     Minutes per session: Not on file   • Stress: Not on file   Relationships   • Social connections:     Talks on  phone: Not on file     Gets together: Not on file     Attends [employer] service: Not on file     Active member of club or organization: Not on file     Attends meetings of clubs or organizations: Not on file     Relationship status: Not on file   • Intimate partner violence:     Fear of current or ex partner: Not on file     Emotionally abused: Not on file     Physically abused: Not on file     Forced sexual activity: Not on file   Other Topics Concern   • Not on file   Social History Narrative   • Not on file      Nicole Painting M.D.      SURGICAL HISTORY  Past Surgical History:   Procedure Laterality Date   • RHYTIDECTOMY  10/15/2012    Performed by Nelsy Sun M.D. at SURGERY [facility] ORS   • BLEPHAROPLASTY  10/15/2012    Performed by Nelsy Sun M.D. at SURGERY [facility] ORS   • CHOLECYSTECTOMY  2004   • HYSTERECTOMY LAPAROSCOPY  1975       CURRENT MEDICATIONS  Home Medications    **Home medications have not yet been reviewed for this encounter**       No current facility-administered medications on file prior to encounter.      Current Outpatient Medications on File Prior to Encounter   Medication Sig Dispense Refill   • Olopatadine HCl 0.2 % Solution 1 Drop by Ophthalmic route every day. 1 Bottle 2   • fluticasone (FLONASE) 50 MCG/ACT nasal spray Spray 1 Spray in nose 2 times a day. 1 Bottle 2   • vitamin D, Ergocalciferol, (DRISDOL) 19640 units Cap capsule Take 1 Cap by mouth every day.  4   • FLUZONE HIGH-DOSE 0.5 ML Suspension Prefilled Syringe injection ADM 0.5ML IM UTD  0   • guaifenesin-codeine (ROBITUSSIN AC) Solution oral solution Take 5 mL by mouth every four hours as needed for Cough. 420 mL 0   • albuterol (PROAIR HFA) 108 (90 Base) MCG/ACT Aero Soln inhalation aerosol Inhale 2 Puffs by mouth every 6 hours as needed for Shortness of Breath. 8.5 g 0   • azithromycin (ZITHROMAX) 250 MG Tab Take 2 tabs on day one, then 1 tab on days 2-5 6 Tab 0   • atenolol (TENORMIN) 50 MG  "Tab Take 1 Tab by mouth every day. 90 Tab 3       ALLERGIES  Allergies   Allergen Reactions   • Sulfa Drugs Vomiting       PHYSICAL EXAM  VITAL SIGNS: /87   Pulse (!) 44   Temp 36.1 °C (96.9 °F) (Temporal)   Resp 14   Ht 1.575 m (5' 2\")   Wt 74.8 kg (165 lb)   SpO2 98%   BMI 30.18 kg/m²    Pulse Oximetry was obtained. It showed a reading of Pulse Oximetry: 98 %.  I interpreted this as nonhypoxic.     Constitutional: Well developed, Well nourished, No acute distress, Non-toxic appearance.   HENT: Normocephalic, Atraumatic, Bilateral external ears normal, bilateral tympanic membranes normal, Oropharynx moist mucous membranes, No oral exudates, Nose normal.   Eyes:  conjunctiva is normal, there are no signs of exudate.   Neck: Supple, no cervical lymphadenopathy, no meningeal signs..   Lymphatic: No lymphadenopathy noted.   Cardiovascular: Regular rate and rhythm without murmurs gallops or rubs.   Thorax & Lungs: Lungs are clear to auscultation bilaterally, there are no wheezes no rales. Chest wall is nontender.  Abdomen: Soft, nontender nondistended. Bowel sounds are present.   Skin: Warm, Dry, No erythema,   Back: No tenderness, No CVA tenderness.  Musculoskeletal: Good range of motion in all major joints. No tenderness to palpation or major deformities noted. Intact distal pulses, no clubbing, no cyanosis, no edema     Neurologic: Alert & oriented x 3, Normal motor function, Normal sensory function, No focal deficits noted.   Psychiatric: Affect normal, Judgment normal, Mood normal.     EKG  Interpreted below by myself    RADIOLOGY/PROCEDURES  CL-LEFT HEART CATHETERIZATION WITH POSSIBLE INTERVENTION    (Results Pending)   DX-CHEST-PORTABLE (1 VIEW)    (Results Pending)       Results for orders placed or performed during the hospital encounter of 12/11/19   CBC WITH DIFFERENTIAL   Result Value Ref Range    WBC 12.4 (H) 4.8 - 10.8 K/uL    RBC 4.60 4.20 - 5.40 M/uL    Hemoglobin 14.3 12.0 - 16.0 g/dL    " Hematocrit 41.6 37.0 - 47.0 %    MCV 90.4 81.4 - 97.8 fL    MCH 31.1 27.0 - 33.0 pg    MCHC 34.4 33.6 - 35.0 g/dL    RDW 41.5 35.9 - 50.0 fL    Platelet Count 244 164 - 446 K/uL    MPV 9.1 9.0 - 12.9 fL    Neutrophils-Polys 77.70 (H) 44.00 - 72.00 %    Lymphocytes 13.20 (L) 22.00 - 41.00 %    Monocytes 7.10 0.00 - 13.40 %    Eosinophils 1.10 0.00 - 6.90 %    Basophils 0.30 0.00 - 1.80 %    Immature Granulocytes 0.60 0.00 - 0.90 %    Nucleated RBC 0.00 /100 WBC    Neutrophils (Absolute) 9.63 (H) 2.00 - 7.15 K/uL    Lymphs (Absolute) 1.64 1.00 - 4.80 K/uL    Monos (Absolute) 0.88 (H) 0.00 - 0.85 K/uL    Eos (Absolute) 0.14 0.00 - 0.51 K/uL    Baso (Absolute) 0.04 0.00 - 0.12 K/uL    Immature Granulocytes (abs) 0.08 0.00 - 0.11 K/uL    NRBC (Absolute) 0.00 K/uL   EKG   Result Value Ref Range    Report       [facility] Emergency Dept.    Test Date:  2019  Pt Name:    SUGEY KEENAN             Department: ER  MRN:        [id number]                      Room:       TRAUMA - EXAM 1  Gender:     Female                       Technician: [id number]  :        [id number]                   Requested By:ER TRIAGE PROTOCOL  Order #:    [id number]                    Reading MD: JULIAN GOMEZ MD    Measurements  Intervals                                Axis  Rate:       45                           P:          17  IN:         220                          QRS:        25  QRSD:       102                          T:          77  QT:         452  QTc:        391    Interpretive Statements  SINUS BRADYCARDIA  BORDERLINE AV CONDUCTION DELAY  CONSIDER LEFT ATRIAL ABNORMALITY  INFEROPOSTERIOR INFARCT, ACUTE  LATERAL LEADS ARE ALSO INVOLVED  Compared to ECG 10/11/2012 15:30:06  Inferior STEMI  Electronically Signed On 2019 17:27:02 PST by JULIAN GOMEZ MD           COURSE & MEDICAL DECISION MAKING  Pertinent Labs & Imaging studies reviewed. (See chart for details)  Patient presents emerged department for  evaluation.  EKG was obtained out in triage and I read the EKG as an inferior STEMI.  The patient was then activated for heart attack.  I saw the patient immediately.  Patient was given 4 chewable aspirins.  Cardiology also immediately saw the patient at bedside.  At this point patient will be taken to the Cath Lab for PTCA.  I have spoke with Dr. Cardona who is on for hospitalist service for admission after PTCA.  Dr. Andersen cardiology was also at bedside will take the patient to the Cath Lab.    CRITICAL CARE  The very real possibilty of a deterioration of this patient's condition required the highest level of my preparedness for sudden, emergent intervention.  I provided critical care services, which included medication orders, frequent reevaluations of the patient's condition and response to treatment, ordering and reviewing test results, and discussing the case with various consultants.  The critical care time associated with the care of the patient was 31 minutes. Review chart for interventions. This time is exclusive of any other billable procedures.       FINAL IMPRESSION  1. Acute ST elevation myocardial infarction (STEMI) of inferior wall (HCC)                 Electronically signed by: Jin Carolina, 12/11/2019 5:30 PM

## 2019-12-12 NOTE — PROGRESS NOTES
Patient has a diagnosis of STEMI. Notes say angiogram today. No cath report yet.    Echocardiogram not completed at this time. Please note, that if cath report does not address LVF, an echocardiogram will be needed.     Below are the defect free care measures that must be met should patient continue with an ACS diagnosis after angiogram.     It is strongly recommended that if the patient gets a stent in cath lab today, that tomorrow's bedside RN start the meds to beds process so that discharge is not delayed. Please do not call the on call pharmacist overnight to start the process unless of course, an order is placed to discharge the patient overnight.     ACS Measures:    1. ASA prescribed at discharge  2. Beta blockade prescribed at discharge, if patient also has HFrEF (EF less than or equal to 40%), this needs to be one of the three evidence based beta blockers: carvedilol, bisoprolol, Toprol XL  3. High intensity statin prescribed at discharge (atorvastatin 40 mg or rosuvastatin 20 mg)  4. ACE-I or ARB prescribed on discharge for LVEF less than 40%  5. Aldosterone blockade prescribed for patients with EF less than 40% AND history of diabetes mellitus OR history of heart failure, heart failure on presentation or heart failure as an in-hospital event  6. ICR referral order is placed  7. Use the Acute Coronary Syndrome discharge instructions to document that patient has been provided with the contact information for ICR   8. Evaluation of LV systolic function can be by angiogram, or echo before discharge, or within past year, cannot be a future plan for LVSF assessment  9. ACS education is documented daily  10. For any patient who receives a stent, initiate meds to beds: Get the outpatient order for the script from the physician, or the APRN:  • Medication  • Dose  • Route  • Quantity, how many pills in the bottle, (ie for Plavix this would be 30, since it’s once daily; Brilinta would be 60 since it’s twice  daily)  • Refills, most physicians and APRN’s give 11 refills because the patient usually needs to be on the med for one year  • Weekend: Call [phone]: ask for the on-call Anticoagulation Pharmacist to be paged.   • Weekday: call [phone] (anticoagulation clinic)     What if any of the above ACS Measures are contraindicated?    · Request that the discharging provider document the medication/intervention and the contraindication specifically in a progress note  · For example: “no ACE-I meds due to hypotension” is not enough. It needs to say: “No ACE-I, ARNI, ARB due to hypotension”; “No Beta Blockade due to bradycardia”…

## 2019-12-12 NOTE — PROGRESS NOTES
"Cardiology Follow-up Consult Note    Date of Service:    12/12/2019      Consulting Physician: Murali Teran M.D.    Patient ID:    Name:   Elvie Otero   YOB: 1940  Age:   79 y.o.  female   MRN:   [id number]      Reason for Consultation: STEMI    HPI/Patient ID:  Elvie Otero is a 79 y.o.-year-old female with a history of hypertension who presented 12/11/2019 with RCA STEMI. Incomplete revascularized and complicated by procedural aortic dissection.    Interim Events:  # Decreased urine output, borderline hypotension. Given NS bolus this AM  # Denies chest pain, shortness of breath, bleeding, or orthopnea.   # stable ecchymosis at right radial site.       Past medical, surgical, social, and family history reviewed and unchanged from admission except as noted in assessment and plan.    Medications: Reviewed in MAR      Allergies   Allergen Reactions   • Sulfa Drugs Vomiting           Physical Exam  Body mass index is 29.76 kg/m².  BP (!) 91/42   Pulse 62   Temp 36.1 °C (97 °F) (Temporal)   Resp 20   Ht 1.575 m (5' 2\")   Wt 73.8 kg (162 lb 11.2 oz)   SpO2 97%   Vitals:    12/12/19 0545 12/12/19 0600 12/12/19 0615 12/12/19 0630   BP:  (!) 91/42 (!) 91/42    Pulse: 60 63 61 62   Resp: 17 17 13 20   Temp:  36.1 °C (97 °F)     TempSrc:  Temporal     SpO2: 98% 98% 98% 97%   Weight:       Height:         Oxygen Therapy:  Pulse Oximetry: 97 %, O2 (LPM): 2, O2 Delivery: Silicone Nasal Cannula    General: No apparent distress  Eyes: nl conjunctiva  ENT: OP clear  Neck: JVP 7-8 cm H2O  Lungs: normal respiratory effort, CTAB  Heart: RRR, no murmurs, no rubs or gallops, no edema bilateral lower extremities. No LV/RV heave on cardiac palpatation. 2+ left radial pulse.  2+ bilateral dp pulses.   Abdomen: soft, non tender, non distended, no masses, normal bowel sounds.  No HSM.  Extremities/MSK: no clubbing, no cyanosis. No femoral bruits or ecchymosis. Significant but stable ecchymosis at right " radial site.   Neurological: No focal sensory deficits  Psychiatric: Appropriate affect, A/O x 3  Skin: Warm extremities    Exam repeated in full and unchanged except for as noted above.        Labs (personally reviewed and notable for):   Lab Results   Component Value Date/Time    SODIUM 139 12/12/2019 05:04 AM    POTASSIUM 4.1 12/12/2019 05:04 AM    CHLORIDE 110 12/12/2019 05:04 AM    CO2 25 12/12/2019 05:04 AM    GLUCOSE 103 (H) 12/12/2019 05:04 AM    BUN 23 (H) 12/12/2019 05:04 AM    CREATININE 0.89 12/12/2019 05:04 AM      Lab Results   Component Value Date/Time    WBC 12.8 (H) 12/12/2019 05:04 AM    RBC 3.37 (L) 12/12/2019 05:04 AM    HEMOGLOBIN 10.5 (L) 12/12/2019 05:04 AM    HEMATOCRIT 31.3 (L) 12/12/2019 05:04 AM    MCV 92.6 12/12/2019 05:04 AM    MCH 30.9 12/12/2019 05:04 AM    MCHC 33.3 (L) 12/12/2019 05:04 AM    MPV 9.6 12/12/2019 05:04 AM    NEUTSPOLYS 77.70 (H) 12/11/2019 05:17 PM    LYMPHOCYTES 13.20 (L) 12/11/2019 05:17 PM    MONOCYTES 7.10 12/11/2019 05:17 PM    EOSINOPHILS 1.10 12/11/2019 05:17 PM    BASOPHILS 0.30 12/11/2019 05:17 PM            Cardiac Imaging and Procedures Review:    EKG dated 12/11/2019: My personal interpretation is sinus bradycardia, inferoposterior STEMI    Repeat EKG personally reviewed and showed decreased ST changes.      Echo 12/11/2019:  Echocardiography Laboratory     CONCLUSIONS  No prior study is available for comparison.   Technically difficult study.   Normal left ventricular chamber size.  Inferior wall hypokinesis.  Left ventricular systolic function is normal.  Left ventricular ejection fraction is visually estimated to be 55-60%.  Normal left ventricular wall thickness.  Normal pericardium without effusion.      Radiology test Review:  CXR: reviewed personally and was clear without infiltrates     CT reviewed and showed contained dissection.       Impression and Medical Decision Making:  # Acute inferoposterior STEMI. S/p NILSA x 2. Cath 12/11/2019 complicated by  contained ascending aortic dissection s/p 4mm bare metal stent to ostial . Incomplete revascularization of distal RCA with continued ST elevation on EKG and inferior WMA.    # Hypertension   # Bradycardia, 2nd degree heart block, Mobitz 1, s/p temporary pacemaker. Removed 12/12/2019.   # CODI, resolved but now with oliguria and hypotension.      Recommendations:  # aspirin 81mg PO daily  # start plavix 300mg PO x 1 then 75mg PO Daily as dissection appears to remain contained and will not necessarily need surgery.   # lipid panel, hgbA1c in the AM  # lipitor 40mg PO daily  # 0.5L IV fluids post-CTA to help decrease risk of KARTIK.   # continue to monitor     Thank you for allowing me to participate in the care of this patient, I will continue to follow.  Please contact me with any questions.      Taco Andersen MD  Cardiologist, [facility]   [phone]         normal (ped)...

## 2019-12-12 NOTE — OR SURGEON
Immediate Post-Operative Note      PreOp Diagnosis: Acute inferoposterior STEMI, second degree AV block type I    PostOp Diagnosis: 100% distal RCA occlusion, no sig dis LCA,   Inferior hypokinesis, severe tortuorsity Rt subclavian.innoinate artery  S/p 2.25x18 mm Bulmaro NILSA to dist RCA into PLB and PTCA of PDA   S/p 4x23 mm bare metal stent to ostial RCA    Procedure(s) :  Coronary Angiography, Left Heart Catheterization, Left Ventriculography  Temporary pacer  PCI RCA    Surgeon(s):  Chelsea Armstrong M.D.    Type of Anesthesia: Moderate Sedation    Specimen: None      Findings: As above    Complications: contained dissection of ascending aorta      Chelsea Armstrong M.D.  12/11/2019 9:14 PM

## 2019-12-12 NOTE — ED NOTES
Med rec updated and complete. Allergies reviewed. No antibiotic use in last 14 days.  Home pharmacy [facility]

## 2019-12-12 NOTE — H&P
Hospital Medicine History & Physical Note    Date of Service  12/12/2019    Primary Care Physician  Nicole Painting M.D.    Consultants  Cardiology  ICU    Code Status  Full    Chief Complaint  Chest pain    History of Presenting Illness  79 y.o. female who presented 12/11/2019 with past medical history of hypertension who complains of squeezing chest pain that started this morning.  Pain lasted greater than 30 minutes this morning.  Patient described the pain in the center of her chest, nonradiating, nonexertional, non-positional.  Patient denies any shortness of breath, orthopnea, cough, fever, chills, nausea, syncope.  Upon arrival to emergency room heart rate was 44, respiratory rate 14, blood pressure 128/87, saturating 98% on room air.  EKG interpreted by me found sinus bradycardia,  ST elevations in inferior leads, ST elevations in V5 to V6 reciprocal changes in 1 and aVL  Chest x-ray interpreted by me found no acute pulmonary process  Review of Systems  Review of Systems   Constitutional: Negative for chills, diaphoresis, fever and malaise/fatigue.   HENT: Negative for congestion, ear discharge, ear pain, hearing loss, nosebleeds, sinus pain, sore throat and tinnitus.    Eyes: Negative for blurred vision, double vision, photophobia and pain.   Respiratory: Negative for cough, hemoptysis, sputum production, shortness of breath, wheezing and stridor.    Cardiovascular: Positive for chest pain. Negative for palpitations, orthopnea, claudication, leg swelling and PND.   Gastrointestinal: Negative for abdominal pain, blood in stool, constipation, diarrhea, heartburn, melena, nausea and vomiting.   Genitourinary: Negative for dysuria, flank pain, frequency, hematuria and urgency.   Musculoskeletal: Negative for back pain, falls, joint pain, myalgias and neck pain.   Skin: Negative for itching and rash.   Neurological: Negative for dizziness, tingling, tremors, weakness and headaches.   Endo/Heme/Allergies:  Negative for environmental allergies and polydipsia. Does not bruise/bleed easily.   Psychiatric/Behavioral: Negative for depression, hallucinations, substance abuse and suicidal ideas.       Past Medical History   has a past medical history of Heart burn and Hypertension.    Surgical History   has a past surgical history that includes hysterectomy laparoscopy (1975); cholecystectomy (2004); rhytidectomy (10/15/2012); and blepharoplasty (10/15/2012).     Family History  No family history of premature coronary artery disease    Social History   reports that she has never smoked. She has never used smokeless tobacco. She reports current alcohol use. She reports that she does not use drugs.    Allergies  Allergies   Allergen Reactions   • Sulfa Drugs Vomiting       Medications  Prior to Admission Medications   Prescriptions Last Dose Informant Patient Reported? Taking?   atenolol (TENORMIN) 50 MG Tab 12/11/2019 at 0800 Patient Yes Yes   Sig: Take 50 mg by mouth every day.      Facility-Administered Medications: None       Physical Exam  Temp:  [36 °C (96.8 °F)-36.1 °C (96.9 °F)] 36 °C (96.8 °F)  Pulse:  [44-62] 54  Resp:  [10-26] 14  BP: ()/(40-87) 98/43  SpO2:  [84 %-100 %] 100 %    Physical Exam  Vitals signs and nursing note reviewed.   Constitutional:       General: She is not in acute distress.     Appearance: Normal appearance. She is not ill-appearing, toxic-appearing or diaphoretic.   HENT:      Head: Normocephalic and atraumatic.      Nose: No congestion or rhinorrhea.      Mouth/Throat:      Pharynx: No oropharyngeal exudate or posterior oropharyngeal erythema.   Eyes:      General: No scleral icterus.  Neck:      Musculoskeletal: No neck rigidity or muscular tenderness.      Vascular: No carotid bruit.   Cardiovascular:      Rate and Rhythm: Regular rhythm. Bradycardia present.      Pulses: Normal pulses.      Heart sounds: Normal heart sounds. No murmur. No friction rub. No gallop.    Pulmonary:       Effort: Pulmonary effort is normal. No respiratory distress.      Breath sounds: Normal breath sounds. No stridor. No wheezing or rhonchi.   Abdominal:      General: Abdomen is flat. There is no distension.      Palpations: There is no mass.      Tenderness: There is no tenderness. There is no left CVA tenderness, guarding or rebound.      Hernia: No hernia is present.   Musculoskeletal: Normal range of motion.         General: No swelling.      Right lower leg: No edema.      Left lower leg: No edema.   Lymphadenopathy:      Cervical: No cervical adenopathy.   Skin:     General: Skin is warm and dry.      Capillary Refill: Capillary refill takes more than 3 seconds.      Coloration: Skin is not jaundiced or pale.      Findings: No bruising or erythema.   Neurological:      Mental Status: She is alert.         Laboratory:  Recent Labs     12/11/19 1717   WBC 12.4*   RBC 4.60   HEMOGLOBIN 14.3   HEMATOCRIT 41.6   MCV 90.4   MCH 31.1   MCHC 34.4   RDW 41.5   PLATELETCT 244   MPV 9.1     Recent Labs     12/11/19  1717   SODIUM 139   POTASSIUM 3.9   CHLORIDE 105   CO2 24   GLUCOSE 137*   BUN 26*   CREATININE 1.38   CALCIUM 9.1     Recent Labs     12/11/19  1717   ALTSGPT 7   ASTSGOT 10*   ALKPHOSPHAT 71   TBILIRUBIN 0.5   LIPASE 48   GLUCOSE 137*     Recent Labs     12/11/19 1717   APTT 24.9   INR 1.04     Recent Labs     12/11/19  1717   NTPROBNP 550*         Recent Labs     12/11/19 1717   TROPONINT 21*       Urinalysis:    No results found     Imaging:  EC-ECHOCARDIOGRAM LTD W/O CONT   Final Result      DX-CHEST-PORTABLE (1 VIEW)   Final Result      No consolidation.      CL-LEFT HEART CATHETERIZATION WITH POSSIBLE INTERVENTION    (Results Pending)         Assessment/Plan:  I anticipate this patient will require at least two midnights for appropriate medical management, necessitating inpatient admission.    * Acute ST elevation myocardial infarction (STEMI) of inferoposterior wall (HCC)- (present on  admission)  Assessment & Plan  Admitted for chest pain and STEMI seen on EKG. Troponins were elevated. Cardiology was consulted. The patient was brought to cath and a NILSA was placed in RCA.    Plan to discharge with DAPT for 1 year. Will follow up with cardiology and cardiac rehab outpatient.   On nitroglycerin drip  High dose statin goal LDL <65  Check lipid panel, HBA1c        Ascending aortic dissection (HCC)  Assessment & Plan  CT surgery was consulted and recommended observation  Continue to monitor blood pressure with nitroglycerin drip  Goal blood pressure less than 120    Second degree heart block  Assessment & Plan  Likely secondary to RCA lesion  Avoid beta-blockers  Temporary pacemaker placed    Leukocytosis  Assessment & Plan  Likely secondary to STEMI and infarct    CODI (acute kidney injury) (HCC)  Assessment & Plan  Likely prerenal  IV fluid hydration with normal saline  Monitor BMP and assess response  Avoid IV contrast/nephrotoxins/NSAIDs  Dose adjust meds for decreased GFR  Check urine electrolytes    Essential hypertension- (present on admission)  Assessment & Plan  Currently only takes atenolol home which will avoid since she is having heart block and has an RCA lesion  Continue nitroglycerin drip and monitor blood pressure      VTE prophylaxis: SCD

## 2019-12-12 NOTE — PROGRESS NOTES
Monitor Summary:    SB-SR: 50-60's; Partially paced with frequent PVC's.    0.20/0.08/0.40    12 hour chart check.

## 2019-12-12 NOTE — PROGRESS NOTES
Dr. Hernández updated on patient status during rounds and plan of care discussed. This RN updated physician on low UOP (60cc for shift at 0930), and concern of more contrast discussed. Order received for 500cc Lactated Ringer bolus and to complete CTA.

## 2019-12-12 NOTE — ASSESSMENT & PLAN NOTE
Likely prerenal  IV fluid hydration with normal saline  Monitor BMP and assess response  Avoid IV contrast/nephrotoxins/NSAIDs  Dose adjust meds for decreased GFR

## 2019-12-12 NOTE — CARE PLAN
Problem: Acute Care of the Cardiac Cath Patient  Goal: Post Procedure Optimal Outcome for the Cardiac Cath Patient  Intervention: EKG on return to unit  Note:   Done  Intervention: Bedrest per order, log roll every 2 hours on unaffected side, do not bend leg or lift head  Note:   Done  Intervention: Vital signs every 15 minutes X 4, then every 30 minutes X 4, then every hour X 4  Note:   Done  Intervention: Assess peripheral pulses every 15 minutes X 4  Note:   Done  Intervention: Assess for hematoma every 15 minutes X 4  Note:   Done

## 2019-12-12 NOTE — PROGRESS NOTES
Patient brought to T-612 from cath lab by x2 cath lab RN's. Patient on transport ZOLL, on nitroglycerin drip at 5mcg/min. Bedside report received, patient attached to in room monitoring equipment, assessment started.

## 2019-12-12 NOTE — CONSULTS
Critical Care Consultation    Date of consult: 12/12/2019    Referring Physician  Dr Cardona    Reason for Consultation  Stemi with coronary dissection    History of Presenting Illness  79 y.o. female who presented 12/11/2019 with chest pain that started this morning.  It was central chest pain and she did not feel any radiation.  She had ST elevations in the inferior and lateral leads.  She has a past medical history of hypertension which she is on atenolol.  She is currently post cardiac catheterization.  She had 100% distal RCA occlusion, inferior hypokinesis of the heart and severe tortuosity of the right subclavian artery.  She had a drug-eluting stent to the distal RCA and a bare-metal stent to the ostial RCA.  She was found to have a dissection of ascending aorta.  CT surgery consulted for surgical evaluation.  She is now in the ICU.  She is on a nitro drip for a goal systolic blood pressure 100 to 120.  She currently does not have any chest pain or shortness of breath.  She is on 4 L nc.  No hx of pulmonary disease, lifetime non smoker.      Code Status  Full Code    Review of Systems  Review of Systems   Constitutional: Negative for chills, diaphoresis, fever, malaise/fatigue and weight loss.   HENT: Negative for congestion and sinus pain.    Eyes: Negative for blurred vision, double vision and photophobia.   Respiratory: Negative for cough, hemoptysis, sputum production, shortness of breath, wheezing and stridor.    Cardiovascular: Positive for chest pain. Negative for palpitations and leg swelling.   Gastrointestinal: Negative for blood in stool, heartburn, melena and vomiting.   Genitourinary: Negative for dysuria and urgency.   Musculoskeletal: Negative for back pain, myalgias and neck pain.   Skin: Negative for itching and rash.   Neurological: Negative for dizziness, tingling, sensory change, speech change, focal weakness and headaches.   Endo/Heme/Allergies: Negative for polydipsia. Does not  bruise/bleed easily.   Psychiatric/Behavioral: Negative for depression. The patient is not nervous/anxious.        Past Medical History   has a past medical history of Heart burn and Hypertension.    Surgical History   has a past surgical history that includes hysterectomy laparoscopy (1975); cholecystectomy (2004); rhytidectomy (10/15/2012); and blepharoplasty (10/15/2012).    Family History  family history is not on file.    Social History   reports that she has never smoked. She has never used smokeless tobacco. She reports current alcohol use. She reports that she does not use drugs.    Medications  Home Medications     Reviewed by Alexandrea Betancourt (Pharmacy Tech) on 12/11/19 at 1743  Med List Status: Complete   Medication Last Dose Status   atenolol (TENORMIN) 50 MG Tab 12/11/2019 Active              Current Facility-Administered Medications   Medication Dose Route Frequency Provider Last Rate Last Dose   • ASPIRIN 81 MG PO CHEW            • NS infusion   Intravenous Continuous Chelsea Armstrong M.D. 100 mL/hr at 12/11/19 2326     • aspirin EC (ECOTRIN) tablet 81 mg  81 mg Oral DAILY Chelsea Armstrong M.D.       • nitroglycerin 50 mg in D5W 250 ml infusion  0-200 mcg/min Intravenous Continuous Chelsea Armstrong M.D. 1.5 mL/hr at 12/11/19 2122 5 mcg/min at 12/11/19 2122   • senna-docusate (PERICOLACE or SENOKOT S) 8.6-50 MG per tablet 2 Tab  2 Tab Oral BID Fabby Cardona M.D.        And   • polyethylene glycol/lytes (MIRALAX) PACKET 1 Packet  1 Packet Oral QDAY PRN Fabby Cardona M.D.        And   • magnesium hydroxide (MILK OF MAGNESIA) suspension 30 mL  30 mL Oral QDAY PRN Fabby Cardona M.D.        And   • bisacodyl (DULCOLAX) suppository 10 mg  10 mg Rectal QDAY PRN Fabby Cardona M.D.       • Respiratory Care per Protocol   Nebulization Continuous RT Fabby Cardona M.D.       • ondansetron (ZOFRAN) syringe/vial injection 4 mg  4 mg Intravenous Q4HRS PRN Hamad Brendan, M.D.       • ondansetron (ZOFRAN  ODT) dispertab 4 mg  4 mg Oral Q4HRS PRN Fabby Cardona M.D.           Allergies  Allergies   Allergen Reactions   • Sulfa Drugs Vomiting       Vital Signs last 24 hours  Temp:  [36 °C (96.8 °F)-36.1 °C (96.9 °F)] 36 °C (96.8 °F)  Pulse:  [44-62] 54  Resp:  [10-26] 14  BP: ()/(40-87) 98/43  SpO2:  [84 %-100 %] 100 %    Physical Exam  Physical Exam  Vitals signs and nursing note reviewed. Exam conducted with a chaperone present.   Constitutional:       General: She is not in acute distress.     Appearance: Normal appearance. She is not ill-appearing, toxic-appearing or diaphoretic.   HENT:      Head: Normocephalic and atraumatic.      Right Ear: External ear normal.      Left Ear: External ear normal.      Nose: No congestion or rhinorrhea.      Mouth/Throat:      Mouth: Mucous membranes are dry.      Pharynx: No oropharyngeal exudate or posterior oropharyngeal erythema.   Eyes:      General: No scleral icterus.     Extraocular Movements: Extraocular movements intact.      Conjunctiva/sclera: Conjunctivae normal.      Pupils: Pupils are equal, round, and reactive to light.   Neck:      Musculoskeletal: Neck supple. No neck rigidity or muscular tenderness.   Cardiovascular:      Rate and Rhythm: Normal rate and regular rhythm.      Pulses: Normal pulses.      Heart sounds: Normal heart sounds. No murmur.   Pulmonary:      Effort: No respiratory distress.      Breath sounds: No wheezing.   Abdominal:      General: There is no distension.      Palpations: There is no mass.      Tenderness: There is no tenderness. There is no guarding.   Musculoskeletal:         General: No swelling or tenderness.      Right lower leg: No edema.      Left lower leg: No edema.   Lymphadenopathy:      Cervical: No cervical adenopathy.   Skin:     Coloration: Skin is not jaundiced or pale.      Findings: No bruising, erythema, lesion or rash.   Neurological:      General: No focal deficit present.      Mental Status: She is alert and  oriented to person, place, and time.      Cranial Nerves: No cranial nerve deficit.      Sensory: No sensory deficit.      Motor: No weakness.      Coordination: Coordination normal.      Deep Tendon Reflexes: Reflexes normal.   Psychiatric:         Mood and Affect: Mood normal.         Behavior: Behavior normal.         Fluids    Intake/Output Summary (Last 24 hours) at 2019 0032  Last data filed at 2019 0000  Gross per 24 hour   Intake 187.67 ml   Output 480 ml   Net -292.33 ml       Laboratory  Recent Results (from the past 48 hour(s))   EKG    Collection Time: 19  5:04 PM   Result Value Ref Range    Report       [facility]    Test Date:  2019  Pt Name:    SUGEY KEENAN             Department: ER  MRN:        [id number]                      Room:       TRAUMA - EXAM 1  Gender:     Female                       Technician: [id number]  :        [id number]                   Requested By:ER TRIAGE PROTOCOL  Order #:    [id number]                    Reading MD: JULIAN GOMEZ MD    Measurements  Intervals                                Axis  Rate:       45                           P:          17  NH:         220                          QRS:        25  QRSD:       102                          T:          77  QT:         452  QTc:        391    Interpretive Statements  SINUS BRADYCARDIA  BORDERLINE AV CONDUCTION DELAY  CONSIDER LEFT ATRIAL ABNORMALITY  INFEROPOSTERIOR INFARCT, ACUTE  LATERAL LEADS ARE ALSO INVOLVED  Compared to ECG 10/11/2012 15:30:06  Inferior STEMI  Electronically Signed On 2019 17:27:02 PST by JULIAN GOMEZ MD     TROPONIN    Collection Time: 19  5:17 PM   Result Value Ref Range    Troponin T 21 (H) 6 - 19 ng/L   proBrain Natriuretic Peptide, NT    Collection Time: 19  5:17 PM   Result Value Ref Range    NT-proBNP 550 (H) 0 - 125 pg/mL   CBC WITH DIFFERENTIAL    Collection Time: 19  5:17 PM   Result Value Ref  Range    WBC 12.4 (H) 4.8 - 10.8 K/uL    RBC 4.60 4.20 - 5.40 M/uL    Hemoglobin 14.3 12.0 - 16.0 g/dL    Hematocrit 41.6 37.0 - 47.0 %    MCV 90.4 81.4 - 97.8 fL    MCH 31.1 27.0 - 33.0 pg    MCHC 34.4 33.6 - 35.0 g/dL    RDW 41.5 35.9 - 50.0 fL    Platelet Count 244 164 - 446 K/uL    MPV 9.1 9.0 - 12.9 fL    Neutrophils-Polys 77.70 (H) 44.00 - 72.00 %    Lymphocytes 13.20 (L) 22.00 - 41.00 %    Monocytes 7.10 0.00 - 13.40 %    Eosinophils 1.10 0.00 - 6.90 %    Basophils 0.30 0.00 - 1.80 %    Immature Granulocytes 0.60 0.00 - 0.90 %    Nucleated RBC 0.00 /100 WBC    Neutrophils (Absolute) 9.63 (H) 2.00 - 7.15 K/uL    Lymphs (Absolute) 1.64 1.00 - 4.80 K/uL    Monos (Absolute) 0.88 (H) 0.00 - 0.85 K/uL    Eos (Absolute) 0.14 0.00 - 0.51 K/uL    Baso (Absolute) 0.04 0.00 - 0.12 K/uL    Immature Granulocytes (abs) 0.08 0.00 - 0.11 K/uL    NRBC (Absolute) 0.00 K/uL   COMP METABOLIC PANEL    Collection Time: 12/11/19  5:17 PM   Result Value Ref Range    Sodium 139 135 - 145 mmol/L    Potassium 3.9 3.6 - 5.5 mmol/L    Chloride 105 96 - 112 mmol/L    Co2 24 20 - 33 mmol/L    Anion Gap 10.0 0.0 - 11.9    Glucose 137 (H) 65 - 99 mg/dL    Bun 26 (H) 8 - 22 mg/dL    Creatinine 1.38 0.50 - 1.40 mg/dL    Calcium 9.1 8.5 - 10.5 mg/dL    AST(SGOT) 10 (L) 12 - 45 U/L    ALT(SGPT) 7 2 - 50 U/L    Alkaline Phosphatase 71 30 - 99 U/L    Total Bilirubin 0.5 0.1 - 1.5 mg/dL    Albumin 4.0 3.2 - 4.9 g/dL    Total Protein 6.4 6.0 - 8.2 g/dL    Globulin 2.4 1.9 - 3.5 g/dL    A-G Ratio 1.7 g/dL   LIPASE    Collection Time: 12/11/19  5:17 PM   Result Value Ref Range    Lipase 48 11 - 82 U/L   PROTHROMBIN TIME    Collection Time: 12/11/19  5:17 PM   Result Value Ref Range    PT 13.8 12.0 - 14.6 sec    INR 1.04 0.87 - 1.13   APTT    Collection Time: 12/11/19  5:17 PM   Result Value Ref Range    APTT 24.9 24.7 - 36.0 sec   ESTIMATED GFR    Collection Time: 12/11/19  5:17 PM   Result Value Ref Range    GFR If  44 (A) >60  mL/min/1.73 m 2    GFR If Non  37 (A) >60 mL/min/1.73 m 2   COD - Adult (Type and Screen)    Collection Time: 19  5:17 PM   Result Value Ref Range    ABO Grouping Only O     Rh Grouping Only POS     Antibody Screen-Cod NEG    EC-ECHOCARDIOGRAM LTD W/O CONT    Collection Time: 19  9:02 PM   Result Value Ref Range    Eject.Frac. MOD BP 52.38     Eject.Frac. MOD 4C 50.77     Eject.Frac. MOD 2C 50.87     Left Ventrical Ejection Fraction 60    EKG    Collection Time: 19 10:22 PM   Result Value Ref Range    Report       [facility]    Test Date:  2019  Pt Name:    SUGEY KEENAN             Department: ER  MRN:        [id number]                      Room:       12  Gender:     Female                       Technician: PAN  :        [id number]                   Requested By:JULIAN GOMEZ  Order #:    [id number]                    Reading MD:    Measurements  Intervals                                Axis  Rate:       52                           P:          44  ND:         140                          QRS:        -17  QRSD:       78                           T:  QT:         436  QTc:        406    Interpretive Statements  SINUS BRADYCARDIA  INFERIOR INFARCT, ACUTE  PROBABLE POSTERIOR INFARCT  Compared to ECG 2019 17:04:52  No significant changes     EKG STAT    Collection Time: 19 10:22 PM   Result Value Ref Range    Report       [facility]    Test Date:  2019  Pt Name:    SUGEY KEENAN             Department: [facility]  MRN:        [id number]                      Room:       12  Gender:     Female                       Technician: PAN  :        [id number]                   Requested By:DAISY CERVANTES  Order #:    [id number]                    Reading MD:    Measurements  Intervals                                Axis  Rate:       52                           P:          44  ND:         140                          QRS:        -17  QRSD:        78                           T:  QT:         436  QTc:        406    Interpretive Statements  SINUS BRADYCARDIA  INFERIOR INFARCT, ACUTE  PROBABLE POSTERIOR INFARCT  Compared to ECG 12/11/2019 17:04:52  No significant changes         Imaging  EC-ECHOCARDIOGRAM LTD W/O CONT   Final Result      DX-CHEST-PORTABLE (1 VIEW)   Final Result      No consolidation.      CL-LEFT HEART CATHETERIZATION WITH POSSIBLE INTERVENTION    (Results Pending)       Assessment/Plan  * Acute ST elevation myocardial infarction (STEMI) of inferoposterior wall (HCC)- (present on admission)  Assessment & Plan  2 stents to RCA, distal and ostial  Aortic artery aneurysm present  Goal sbp 100-120, on nitro drip  On statin        Ascending aortic dissection (HCC)  Assessment & Plan  Goal sbp 100-120  On nitro drip  Ct surgery consulted for possible surgical intervention      Second degree heart block  Assessment & Plan  Temporary pacemaker in place, set for 50 hr or greater    Leukocytosis  Assessment & Plan  Secondary to stemi    CODI (acute kidney injury) ([facility])  Assessment & Plan  Secondary to heart block and Stemi  Received contrast  Careful fluids    Essential hypertension- (present on admission)  Assessment & Plan  Goal sbp 100-120, on nitro drip  Atenolol from outpt held      Discussed patient condition and risk of morbidity and/or mortality with RN, RT, Pharmacy and Patient.      The patient remains critically ill.  On nitro drip for tight targeted blood pressure with goal sbp 100-120.  She is post heart catheterization with stents to right coronary artery and found to have aortic artery dissection.  Temporary pacemaker for targeted hr 50 or greater with second degree av block type I.  High risk for clinical deterioration including death without critical care management and monitoring.  Critical care time = 60 minutes in directly providing and coordinating critical care and extensive data review.  No time overlap and excludes  procedures.

## 2019-12-12 NOTE — CONSULTS
Cardiology Initial Consult Note    Date of note:    12/11/2019      Consulting Physician: Jin Carolina M.D.    Patient ID:    Name:   Elvie Otero   YOB: 1940  Age:   79 y.o.  female   MRN:   [id number]      Reason for Consultation: STEMI    HPI:  Elvie Otero is a 79 y.o.-year-old female with a history of hypertension who presents with chest pain.     She had acute onset of 10/10 substernal squeezing chest pressure around 4pm today when she got home from work. No syncope. Presented to ED and was found to have acute inferoposterior STEMI. Chest pain decreased to 5/10 without intervention. She did take 2 old baby aspirin at home prior to arrival.     She reports no previous cardiac history, no history of CVA, CHF, or bleeding.           ROS  Constitution: Negative for chills, fever and night sweats.   HENT: Negative for nosebleeds.    Eyes: Negative for vision loss in left eye and vision loss in right eye.   Respiratory: Negative for hemoptysis.    Gastrointestinal: Negative for hematemesis, hematochezia and melena.   Genitourinary: Negative for hematuria.   Neurological: Negative for focal weakness, numbness and paresthesias.      All others reviewed and negative.      Past Medical History:   Diagnosis Date   • Heart burn    • Hypertension        Past Surgical History:   Procedure Laterality Date   • RHYTIDECTOMY  10/15/2012    Performed by Nelsy Sun M.D. at [facility] ORS   • BLEPHAROPLASTY  10/15/2012    Performed by Nelsy Sun M.D. at [facility] ORS   • CHOLECYSTECTOMY  2004   • HYSTERECTOMY LAPAROSCOPY  1975         Current Outpatient Medications   Medication Sig Dispense Refill   • Olopatadine HCl 0.2 % Solution 1 Drop by Ophthalmic route every day. 1 Bottle 2   • fluticasone (FLONASE) 50 MCG/ACT nasal spray Spray 1 Spray in nose 2 times a day. 1 Bottle 2   • vitamin D, Ergocalciferol, (DRISDOL) 98399 units Cap capsule Take 1 Cap by mouth  every day.  4   • FLUZONE HIGH-DOSE 0.5 ML Suspension Prefilled Syringe injection ADM 0.5ML IM UTD  0   • guaifenesin-codeine (ROBITUSSIN AC) Solution oral solution Take 5 mL by mouth every four hours as needed for Cough. 420 mL 0   • albuterol (PROAIR HFA) 108 (90 Base) MCG/ACT Aero Soln inhalation aerosol Inhale 2 Puffs by mouth every 6 hours as needed for Shortness of Breath. 8.5 g 0   • azithromycin (ZITHROMAX) 250 MG Tab Take 2 tabs on day one, then 1 tab on days 2-5 6 Tab 0   • atenolol (TENORMIN) 50 MG Tab Take 1 Tab by mouth every day. 90 Tab 3         Allergies   Allergen Reactions   • Sulfa Drugs Vomiting         FH - unable to obtain secondary to acuity of situation.       Social History     Socioeconomic History   • Marital status:      Spouse name: Not on file   • Number of children: Not on file   • Years of education: Not on file   • Highest education level: Not on file   Occupational History   • Not on file   Social Needs   • Financial resource strain: Not on file   • Food insecurity:     Worry: Not on file     Inability: Not on file   • Transportation needs:     Medical: Not on file     Non-medical: Not on file   Tobacco Use   • Smoking status: Never Smoker   • Smokeless tobacco: Never Used   Substance and Sexual Activity   • Alcohol use: Yes     Comment: 1 per week   • Drug use: No   • Sexual activity: Not on file   Lifestyle   • Physical activity:     Days per week: Not on file     Minutes per session: Not on file   • Stress: Not on file   Relationships   • Social connections:     Talks on phone: Not on file     Gets together: Not on file     Attends [employer] service: Not on file     Active member of club or organization: Not on file     Attends meetings of clubs or organizations: Not on file     Relationship status: Not on file   • Intimate partner violence:     Fear of current or ex partner: Not on file     Emotionally abused: Not on file     Physically abused: Not on file     Forced  "sexual activity: Not on file   Other Topics Concern   • Not on file   Social History Narrative   • Not on file         Physical Exam  Body mass index is 30.18 kg/m².  /87   Pulse (!) 44   Temp 36.1 °C (96.9 °F) (Temporal)   Resp 14   Ht 1.575 m (5' 2\")   Wt 74.8 kg (165 lb)   SpO2 98%   Vitals:    12/11/19 1701 12/11/19 1710   BP: 128/87    Pulse: (!) 44    Resp: 14    Temp: 36.1 °C (96.9 °F)    TempSrc: Temporal    SpO2: 98%    Weight:  74.8 kg (165 lb)   Height: 1.575 m (5' 2\") 1.575 m (5' 2\")     Oxygen Therapy:  Pulse Oximetry: 98 %    General: No apparent distress  Eyes: nl conjunctiva  ENT: OP clear  Neck: JVP 7-8 cm H2O, no carotid bruits  Lungs: normal respiratory effort, CTAB  Heart: RRR, no murmurs, no rubs or gallops, no edema bilateral lower extremities. No LV/RV heave on cardiac palpatation. 2+ bilateral radial pulses.  2+ bilateral dp pulses.   Abdomen: soft, non tender, non distended, no masses, normal bowel sounds.  No HSM.  Extremities/MSK: no clubbing, no cyanosis  Neurological: No focal sensory deficits  Psychiatric: Appropriate affect, A/O x 3  Skin: Warm extremities        Labs (personally reviewed and notable for):   hgb 14.3      Cardiac Imaging and Procedures Review:    EKG dated 12/11/2019: My personal interpretation is sinus bradycardia, inferoposterior STEMI    Radiology test Review:  CXR: reviewed personally and was clear without infiltrates           Impression and Medical Decision Making:  # Acute inferoposterior STEMI. Patient is critically ill.   # Hypertension  # Bradycardia, likely secondary to above    Recommendations:  # discussed case with Dr. Armstrong, he agrees to perform urgent cardiac catheterization  # aspirin 324mg PO x 1 given in the ER  # heparin and DAPT to be given in cath lab  # echo  # Admit to [facility] for monitoring  # lipid panel, hgbA1c in the AM  # lipitor 40mg PO daily  # Dr. Cardona has been contacted about admission post-catheterization.       Thank " you for allowing me to participate in the care of this patient, I will continue to follow.  Please contact me with any questions.      Taco Andersen MD  Cardiologist, [facility] [place]   [phone]

## 2019-12-12 NOTE — PROGRESS NOTES
2 RN skin check complete with Marcial RODRIGUEZ.   Devices in place BP Cuff, pulse ox, femoral sheath, temporary pacemaker, Zoll pads, TR band.  Skin assessed under devices: Yes.  Confirmed pressure ulcers found on N/A.  Patient noted to have right radial hematoma under TR band. MD aware, no new interventions to be set in place.  The following interventions in place: patient on waffle cushion, devices repositioned q2 hours, patient repositioned q2 hours.

## 2019-12-13 PROBLEM — I48.91 ATRIAL FIBRILLATION WITH RVR (HCC): Status: ACTIVE | Noted: 2019-12-13

## 2019-12-13 LAB
ANION GAP SERPL CALC-SCNC: 7 MMOL/L (ref 0–11.9)
APTT PPP: 30.8 SEC (ref 24.7–36)
APTT PPP: 77.8 SEC (ref 24.7–36)
BUN SERPL-MCNC: 12 MG/DL (ref 8–22)
CALCIUM SERPL-MCNC: 8.4 MG/DL (ref 8.5–10.5)
CHLORIDE SERPL-SCNC: 110 MMOL/L (ref 96–112)
CHOLEST SERPL-MCNC: 120 MG/DL (ref 100–199)
CO2 SERPL-SCNC: 21 MMOL/L (ref 20–33)
CREAT SERPL-MCNC: 0.9 MG/DL (ref 0.5–1.4)
EKG IMPRESSION: NORMAL
EKG IMPRESSION: NORMAL
EST. AVERAGE GLUCOSE BLD GHB EST-MCNC: 97 MG/DL
GLUCOSE BLD-MCNC: 86 MG/DL (ref 65–99)
GLUCOSE SERPL-MCNC: 173 MG/DL (ref 65–99)
HBA1C MFR BLD: 5 % (ref 0–5.6)
HDLC SERPL-MCNC: 48 MG/DL
LDLC SERPL CALC-MCNC: 59 MG/DL
POTASSIUM SERPL-SCNC: 3.5 MMOL/L (ref 3.6–5.5)
SODIUM SERPL-SCNC: 138 MMOL/L (ref 135–145)
TRIGL SERPL-MCNC: 66 MG/DL (ref 0–149)

## 2019-12-13 PROCEDURE — 80048 BASIC METABOLIC PNL TOTAL CA: CPT

## 2019-12-13 PROCEDURE — 80053 COMPREHEN METABOLIC PANEL: CPT | Mod: 91

## 2019-12-13 PROCEDURE — 99233 SBSQ HOSP IP/OBS HIGH 50: CPT | Performed by: HOSPITALIST

## 2019-12-13 PROCEDURE — 700111 HCHG RX REV CODE 636 W/ 250 OVERRIDE (IP): Performed by: INTERNAL MEDICINE

## 2019-12-13 PROCEDURE — 700111 HCHG RX REV CODE 636 W/ 250 OVERRIDE (IP)

## 2019-12-13 PROCEDURE — 85025 COMPLETE CBC W/AUTO DIFF WBC: CPT

## 2019-12-13 PROCEDURE — 80061 LIPID PANEL: CPT

## 2019-12-13 PROCEDURE — A9270 NON-COVERED ITEM OR SERVICE: HCPCS | Performed by: HOSPITALIST

## 2019-12-13 PROCEDURE — 93010 ELECTROCARDIOGRAM REPORT: CPT | Performed by: INTERNAL MEDICINE

## 2019-12-13 PROCEDURE — 85730 THROMBOPLASTIN TIME PARTIAL: CPT

## 2019-12-13 PROCEDURE — 700105 HCHG RX REV CODE 258: Performed by: INTERNAL MEDICINE

## 2019-12-13 PROCEDURE — 700105 HCHG RX REV CODE 258

## 2019-12-13 PROCEDURE — 83036 HEMOGLOBIN GLYCOSYLATED A1C: CPT

## 2019-12-13 PROCEDURE — 93005 ELECTROCARDIOGRAM TRACING: CPT | Performed by: HOSPITALIST

## 2019-12-13 PROCEDURE — 99233 SBSQ HOSP IP/OBS HIGH 50: CPT | Performed by: INTERNAL MEDICINE

## 2019-12-13 PROCEDURE — 700102 HCHG RX REV CODE 250 W/ 637 OVERRIDE(OP): Performed by: HOSPITALIST

## 2019-12-13 PROCEDURE — 82962 GLUCOSE BLOOD TEST: CPT

## 2019-12-13 PROCEDURE — 770022 HCHG ROOM/CARE - ICU (200)

## 2019-12-13 RX ORDER — DEXTROSE MONOHYDRATE 50 MG/ML
INJECTION, SOLUTION INTRAVENOUS
Status: COMPLETED
Start: 2019-12-13 | End: 2019-12-13

## 2019-12-13 RX ORDER — DEXTROSE MONOHYDRATE 50 MG/ML
INJECTION, SOLUTION INTRAVENOUS CONTINUOUS
Status: DISCONTINUED | OUTPATIENT
Start: 2019-12-13 | End: 2019-12-15

## 2019-12-13 RX ORDER — HALOPERIDOL 5 MG/ML
INJECTION INTRAMUSCULAR
Status: ACTIVE
Start: 2019-12-13 | End: 2019-12-13

## 2019-12-13 RX ORDER — HEPARIN SODIUM 5000 [USP'U]/100ML
INJECTION, SOLUTION INTRAVENOUS CONTINUOUS
Status: DISCONTINUED | OUTPATIENT
Start: 2019-12-13 | End: 2019-12-16

## 2019-12-13 RX ADMIN — DEXTROSE MONOHYDRATE: 50 INJECTION, SOLUTION INTRAVENOUS at 10:00

## 2019-12-13 RX ADMIN — HEPARIN SODIUM 1050 UNITS/HR: 5000 INJECTION, SOLUTION INTRAVENOUS at 16:38

## 2019-12-13 RX ADMIN — AMIODARONE HYDROCHLORIDE 0.5 MG/MIN: 50 INJECTION, SOLUTION INTRAVENOUS at 20:08

## 2019-12-13 RX ADMIN — ATORVASTATIN CALCIUM 80 MG: 80 TABLET, FILM COATED ORAL at 17:12

## 2019-12-13 RX ADMIN — AMIODARONE HYDROCHLORIDE 150 MG: 1.5 INJECTION, SOLUTION INTRAVENOUS at 15:58

## 2019-12-13 NOTE — CARE PLAN
Problem: Communication  Goal: The ability to communicate needs accurately and effectively will improve  Outcome: PROGRESSING AS EXPECTED  Intervention: Educate patient and significant other/support system about the plan of care, procedures, treatments, medications and allow for questions  Note:   This RN and physicians from care team continually updated patient on plan of care progress and answered all questions.     Problem: Safety  Goal: Will remain free from falls  Outcome: PROGRESSING AS EXPECTED  Intervention: Implement fall precautions  Flowsheets (Taken 12/12/2019 6649)  Bed Alarm: Yes - Alarm On  Environmental Precautions: Treaded Slipper Socks on Patient; Personal Belongings, Wastebasket, Call Bell etc. in Easy Reach; Report Given to Other Health Care Providers Regarding Fall Risk; Communication Sign for Patients & Families; Bed in Low Position; Mobility Assessed & Appropriate Sign Placed  Note:   Bed alarm in place, reorientation to patient condition and plan of care provided, yellow socks on patient, and call light within reach.

## 2019-12-13 NOTE — PROGRESS NOTES
Hospital Medicine Daily Progress Note    Date of Service  12/13/2019    Chief Complaint  79 y.o. female admitted 12/11/2019 with chest pain and STEMI    Hospital Course    Right coronary STEMI, ascending aortic dissection      Interval Problem Update  Patient seen and examined today. ICU Care  Care and plan discussed in IDT/Hot rounds.  Lines and assistive devices reviewed.    Patient tolerating treatment and therapies.  All Data, Medication data reviewed.  Case discussed with nursing as available.  Plan of Care reviewed with patient and notified of changes.  12/13 the patient became delirious overnight, required several doses of Haldol, this morning as she has episodes of atrial fibrillation with an inferior ST changes, denies chest pain, but her history is currently unreliable secondary to her delirium.  The son is at the bedside.  Discussed with cardiology, EKG does confirm atrial fibrillation with RVR, follow-up laboratory data pending  Consultants/Specialty  Radiology    Code Status  Full code    Disposition  ICU    Review of Systems  Review of Systems   Unable to perform ROS: Mental acuity        Physical Exam  Pulse:  [69] 69  Resp:  [20] 20  BP: (98)/(45) 98/45  SpO2:  [91 %] 91 %    Physical Exam  Vitals signs and nursing note reviewed.   Constitutional:       Appearance: She is well-developed. She is ill-appearing. She is not diaphoretic.      Interventions: Nasal cannula in place.   HENT:      Head: Normocephalic and atraumatic.      Nose: Nose normal.   Eyes:      Conjunctiva/sclera: Conjunctivae normal.      Pupils: Pupils are equal, round, and reactive to light.   Neck:      Musculoskeletal: Normal range of motion and neck supple.      Thyroid: No thyromegaly.      Vascular: No JVD.   Cardiovascular:      Rate and Rhythm: Tachycardia present. Rhythm irregularly irregular.      Heart sounds: Murmur present. No friction rub. No gallop.    Pulmonary:      Effort: Pulmonary effort is normal.      Breath  sounds: Normal breath sounds. No wheezing or rales.   Abdominal:      General: Bowel sounds are normal. There is no distension.      Palpations: Abdomen is soft. There is no mass.      Tenderness: There is no tenderness. There is no guarding or rebound.   Musculoskeletal: Normal range of motion.         General: No tenderness.   Lymphadenopathy:      Cervical: No cervical adenopathy.   Skin:     General: Skin is warm and dry.   Neurological:      Mental Status: She is lethargic and disoriented.      Cranial Nerves: No cranial nerve deficit.   Psychiatric:         Attention and Perception: She is inattentive.         Mood and Affect: Affect is angry.         Speech: Speech is slurred.         Behavior: Behavior normal.         Cognition and Memory: Cognition is impaired. Memory is impaired.         Fluids    Intake/Output Summary (Last 24 hours) at 12/13/2019 1539  Last data filed at 12/12/2019 1600  Gross per 24 hour   Intake 242 ml   Output --   Net 242 ml       Laboratory  Recent Labs     12/11/19 1717 12/12/19  0504   WBC 12.4* 12.8*   RBC 4.60 3.37*   HEMOGLOBIN 14.3 10.5*   HEMATOCRIT 41.6 31.3*   MCV 90.4 92.6   MCH 31.1 30.9   MCHC 34.4 33.3*   RDW 41.5 42.5   PLATELETCT 244 187   MPV 9.1 9.6     Recent Labs     12/11/19  1717 12/12/19  0504   SODIUM 139 139   POTASSIUM 3.9 4.1   CHLORIDE 105 110   CO2 24 25   GLUCOSE 137* 103*   BUN 26* 23*   CREATININE 1.38 0.89   CALCIUM 9.1 8.6     Recent Labs     12/11/19  1717   APTT 24.9   INR 1.04               Imaging  CT-CTA COMPLETE THORACOABDOMINAL AORTA   Final Result      1.  Hyperdense material within the ascending aortic wall extending from the aortic root to the distal ascending aorta consistent with intramural hematoma or direct injection of contrast media into the aortic wall from prior catheterization procedure.  No    dissection flap demonstrated.   2.  No abdominal aortic aneurysm or dissection.      These findings were discussed with EDISON ROBERTS on  12/12/2019 12:22 PM.            EC-ECHOCARDIOGRAM LTD W/O CONT   Final Result      DX-CHEST-PORTABLE (1 VIEW)   Final Result      No consolidation.      CL-LEFT HEART CATHETERIZATION WITH POSSIBLE INTERVENTION    (Results Pending)        Assessment/Plan  * Acute ST elevation myocardial infarction (STEMI) of inferoposterior wall (HCC)- (present on admission)  Assessment & Plan  Admitted for chest pain and STEMI seen on EKG. Troponins were elevated. Cardiology was consulted. The patient was brought to cath and a NILSA was placed in RCA.    Plan to discharge with DAPT for 1 year. Will follow up with cardiology and cardiac rehab outpatient.   On nitroglycerin drip  High dose statin goal LDL <65  Check lipid panel, HBA1c        Ascending aortic dissection (HCC)  Assessment & Plan  CT surgery was consulted and recommended observation  Continue to monitor blood pressure with nitroglycerin drip  Goal blood pressure less than 120    Second degree heart block  Assessment & Plan  Likely secondary to RCA lesion  Avoid beta-blockers  Temporary pacemaker placed    Leukocytosis  Assessment & Plan  Likely secondary to STEMI and infarct    CODI (acute kidney injury) ([facility])  Assessment & Plan  Likely prerenal  IV fluid hydration with normal saline  Monitor BMP and assess response  Avoid IV contrast/nephrotoxins/NSAIDs  Dose adjust meds for decreased GFR  Check urine electrolytes    Essential hypertension- (present on admission)  Assessment & Plan  Currently only takes atenolol home which will avoid since she is having heart block and has an RCA lesion  Continue nitroglycerin drip and monitor blood pressure    Plan  Close ICU observation in light of new onset A. fib with soft blood pressures  Amiodarone bolus and drip  Mild IV bolus  Follow-up troponin  Treat delirium, minimize affecting medication  Renal function improved  VTE prophylaxis: Heparin, dual antiplatelet therapy    Medically complex high risk    I have performed a physical  exam and reviewed and updated ROS and Plan today . In review of yesterday's note , there are no changes except as documented above.

## 2019-12-13 NOTE — PROGRESS NOTES
"Cardiology Follow-up Consult Note    Date of Service:    12/13/2019      Consulting Physician: Murali Teran M.D.    Patient ID:    Name:   Elvie Otero   YOB: 1940  Age:   79 y.o.  female   MRN:   [id number]      Reason for Consultation: STEMI    HPI/Patient ID:  Elvie Otero is a 79 y.o.-year-old female with a history of hypertension who presented 12/11/2019 with RCA STEMI. Incomplete revascularized and complicated by procedural aortic dissection.    Interim Events:  # Asleep, delirious at times  # paroxysmal atrial fibrillation with RVR.       Past medical, surgical, social, and family history reviewed and unchanged from admission except as noted in assessment and plan.    Medications: Reviewed in MAR      Allergies   Allergen Reactions   • Sulfa Drugs Vomiting           Physical Exam  Body mass index is 29.76 kg/m².  BP (!) 98/45   Pulse 69   Temp 36.5 °C (97.7 °F) (Temporal)   Resp 20   Ht 1.575 m (5' 2\")   Wt 73.8 kg (162 lb 11.2 oz)   SpO2 91%   Vitals:    12/12/19 1300 12/12/19 1400 12/12/19 1500 12/12/19 1600   BP: (!) 88/50 (!) 90/42 (!) 95/51 (!) 98/45   Pulse: 67 69 (!) 51 69   Resp: 16 (!) 21 (!) 33 20   Temp:       TempSrc:       SpO2: 91% 91% 94% 91%   Weight:       Height:         Oxygen Therapy:  Pulse Oximetry: 91 %, O2 Delivery: None (Room Air)    General: No apparent distress, asleep.   Eyes: nl conjunctiva  ENT: OP clear  Neck: JVP indeterminate.   Lungs: normal respiratory effort, CTAB  Heart: tachycardic, irregular no murmurs, no rubs or gallops, no edema bilateral lower extremities. No LV/RV heave on cardiac palpatation. 2+ left radial pulse.  2+ bilateral dp pulses.   Abdomen: soft, non tender, non distended, no masses, normal bowel sounds.  No HSM.  Extremities/MSK: no clubbing, no cyanosis. Significant but stable ecchymosis at right radial site.   Neurological: MANZANO  Psychiatric: asleep  Skin: Warm extremities    Exam repeated in full and unchanged " except for as noted above.        Labs (personally reviewed and notable for):   Lab Results   Component Value Date/Time    SODIUM 139 12/12/2019 05:04 AM    POTASSIUM 4.1 12/12/2019 05:04 AM    CHLORIDE 110 12/12/2019 05:04 AM    CO2 25 12/12/2019 05:04 AM    GLUCOSE 103 (H) 12/12/2019 05:04 AM    BUN 23 (H) 12/12/2019 05:04 AM    CREATININE 0.89 12/12/2019 05:04 AM      Lab Results   Component Value Date/Time    WBC 12.8 (H) 12/12/2019 05:04 AM    RBC 3.37 (L) 12/12/2019 05:04 AM    HEMOGLOBIN 10.5 (L) 12/12/2019 05:04 AM    HEMATOCRIT 31.3 (L) 12/12/2019 05:04 AM    MCV 92.6 12/12/2019 05:04 AM    MCH 30.9 12/12/2019 05:04 AM    MCHC 33.3 (L) 12/12/2019 05:04 AM    MPV 9.6 12/12/2019 05:04 AM    NEUTSPOLYS 77.70 (H) 12/11/2019 05:17 PM    LYMPHOCYTES 13.20 (L) 12/11/2019 05:17 PM    MONOCYTES 7.10 12/11/2019 05:17 PM    EOSINOPHILS 1.10 12/11/2019 05:17 PM    BASOPHILS 0.30 12/11/2019 05:17 PM            Cardiac Imaging and Procedures Review:    EKG dated 12/11/2019: My personal interpretation is sinus bradycardia, inferoposterior STEMI    Repeat EKG personally reviewed and showed decreased ST changes.      Echo 12/11/2019:  Echocardiography Laboratory     CONCLUSIONS  No prior study is available for comparison.   Technically difficult study.   Normal left ventricular chamber size.  Inferior wall hypokinesis.  Left ventricular systolic function is normal.  Left ventricular ejection fraction is visually estimated to be 55-60%.  Normal left ventricular wall thickness.  Normal pericardium without effusion.      Radiology test Review:  CXR: reviewed personally and was clear without infiltrates     CT reviewed and showed contained dissection.       Impression and Medical Decision Making:  # Acute inferoposterior STEMI. S/p NILSA x 2. Cath 12/11/2019 complicated by contained ascending aortic dissection s/p 4mm bare metal stent to ostial . Incomplete revascularization of distal RCA with continued ST elevation on EKG and  inferior WMA.    # Paroxysmal atrial fibrillation with RVR  # Hypertension   # Bradycardia, 2nd degree heart block, Mobitz 1, s/p temporary pacemaker. Removed 12/12/2019.   # CODI, resolved but now with oliguria and hypotension.      Recommendations:  # aspirin 81mg PO daily  # plavix 75mg PO Daily  # Will discuss with Dr. Armstrong use of triple therapy and initiation of heparin given atrial fibrillation.   # lipid panel, hgbA1c  # lipitor 40mg PO daily  # 250cc fluid boluses for hypotension.   # continue to monitor in ICU.   # holding BB, ARB due to hypotension  # Amio load for a fib.     Discussed with ICU team.     Thank you for allowing me to participate in the care of this patient, I will continue to follow.  Please contact me with any questions.      Taco Andersen MD  Cardiologist, [facility] [place]   [phone]

## 2019-12-14 ENCOUNTER — APPOINTMENT (OUTPATIENT)
Dept: RADIOLOGY | Facility: MEDICAL CENTER | Age: 79
DRG: 246 | End: 2019-12-14
Attending: HOSPITALIST
Payer: MEDICARE

## 2019-12-14 PROBLEM — D64.9 ANEMIA: Status: ACTIVE | Noted: 2019-12-14

## 2019-12-14 LAB
ALBUMIN SERPL BCP-MCNC: 2.8 G/DL (ref 3.2–4.9)
ALBUMIN SERPL BCP-MCNC: 2.9 G/DL (ref 3.2–4.9)
ALBUMIN/GLOB SERPL: 1.2 G/DL
ALBUMIN/GLOB SERPL: 1.3 G/DL
ALP SERPL-CCNC: 51 U/L (ref 30–99)
ALP SERPL-CCNC: 57 U/L (ref 30–99)
ALT SERPL-CCNC: 24 U/L (ref 2–50)
ALT SERPL-CCNC: 32 U/L (ref 2–50)
ANION GAP SERPL CALC-SCNC: 11 MMOL/L (ref 0–11.9)
ANION GAP SERPL CALC-SCNC: 5 MMOL/L (ref 0–11.9)
APTT PPP: 105.4 SEC (ref 24.7–36)
APTT PPP: 86.9 SEC (ref 24.7–36)
APTT PPP: 88.2 SEC (ref 24.7–36)
AST SERPL-CCNC: 151 U/L (ref 12–45)
AST SERPL-CCNC: 79 U/L (ref 12–45)
BASOPHILS # BLD AUTO: 0.2 % (ref 0–1.8)
BASOPHILS # BLD AUTO: 0.2 % (ref 0–1.8)
BASOPHILS # BLD: 0.02 K/UL (ref 0–0.12)
BASOPHILS # BLD: 0.03 K/UL (ref 0–0.12)
BILIRUB SERPL-MCNC: 1 MG/DL (ref 0.1–1.5)
BILIRUB SERPL-MCNC: 1.2 MG/DL (ref 0.1–1.5)
BUN SERPL-MCNC: 11 MG/DL (ref 8–22)
BUN SERPL-MCNC: 14 MG/DL (ref 8–22)
CALCIUM SERPL-MCNC: 8.3 MG/DL (ref 8.5–10.5)
CALCIUM SERPL-MCNC: 8.4 MG/DL (ref 8.5–10.5)
CHLORIDE SERPL-SCNC: 109 MMOL/L (ref 96–112)
CHLORIDE SERPL-SCNC: 110 MMOL/L (ref 96–112)
CO2 SERPL-SCNC: 17 MMOL/L (ref 20–33)
CO2 SERPL-SCNC: 25 MMOL/L (ref 20–33)
CREAT SERPL-MCNC: 0.9 MG/DL (ref 0.5–1.4)
CREAT SERPL-MCNC: 0.93 MG/DL (ref 0.5–1.4)
EKG IMPRESSION: NORMAL
EOSINOPHIL # BLD AUTO: 0.01 K/UL (ref 0–0.51)
EOSINOPHIL # BLD AUTO: 0.07 K/UL (ref 0–0.51)
EOSINOPHIL NFR BLD: 0.1 % (ref 0–6.9)
EOSINOPHIL NFR BLD: 0.5 % (ref 0–6.9)
ERYTHROCYTE [DISTWIDTH] IN BLOOD BY AUTOMATED COUNT: 41.8 FL (ref 35.9–50)
ERYTHROCYTE [DISTWIDTH] IN BLOOD BY AUTOMATED COUNT: 44.7 FL (ref 35.9–50)
GLOBULIN SER CALC-MCNC: 2.1 G/DL (ref 1.9–3.5)
GLOBULIN SER CALC-MCNC: 2.4 G/DL (ref 1.9–3.5)
GLUCOSE SERPL-MCNC: 103 MG/DL (ref 65–99)
GLUCOSE SERPL-MCNC: 119 MG/DL (ref 65–99)
HCT VFR BLD AUTO: 25.6 % (ref 37–47)
HCT VFR BLD AUTO: 25.8 % (ref 37–47)
HCT VFR BLD AUTO: 26.7 % (ref 37–47)
HCT VFR BLD AUTO: 31 % (ref 37–47)
HGB BLD-MCNC: 10 G/DL (ref 12–16)
HGB BLD-MCNC: 8.5 G/DL (ref 12–16)
HGB BLD-MCNC: 8.6 G/DL (ref 12–16)
HGB BLD-MCNC: 9.2 G/DL (ref 12–16)
IMM GRANULOCYTES # BLD AUTO: 0.09 K/UL (ref 0–0.11)
IMM GRANULOCYTES # BLD AUTO: 0.11 K/UL (ref 0–0.11)
IMM GRANULOCYTES NFR BLD AUTO: 0.7 % (ref 0–0.9)
IMM GRANULOCYTES NFR BLD AUTO: 0.9 % (ref 0–0.9)
LYMPHOCYTES # BLD AUTO: 1.05 K/UL (ref 1–4.8)
LYMPHOCYTES # BLD AUTO: 1.47 K/UL (ref 1–4.8)
LYMPHOCYTES NFR BLD: 11.4 % (ref 22–41)
LYMPHOCYTES NFR BLD: 8.6 % (ref 22–41)
MAGNESIUM SERPL-MCNC: 1.6 MG/DL (ref 1.5–2.5)
MCH RBC QN AUTO: 30.9 PG (ref 27–33)
MCH RBC QN AUTO: 31.6 PG (ref 27–33)
MCHC RBC AUTO-ENTMCNC: 32.3 G/DL (ref 33.6–35)
MCHC RBC AUTO-ENTMCNC: 33.6 G/DL (ref 33.6–35)
MCV RBC AUTO: 92.1 FL (ref 81.4–97.8)
MCV RBC AUTO: 98.1 FL (ref 81.4–97.8)
MONOCYTES # BLD AUTO: 1.04 K/UL (ref 0–0.85)
MONOCYTES # BLD AUTO: 1.18 K/UL (ref 0–0.85)
MONOCYTES NFR BLD AUTO: 8.1 % (ref 0–13.4)
MONOCYTES NFR BLD AUTO: 9.7 % (ref 0–13.4)
NEUTROPHILS # BLD AUTO: 10.18 K/UL (ref 2–7.15)
NEUTROPHILS # BLD AUTO: 9.84 K/UL (ref 2–7.15)
NEUTROPHILS NFR BLD: 78.9 % (ref 44–72)
NEUTROPHILS NFR BLD: 80.7 % (ref 44–72)
NRBC # BLD AUTO: 0 K/UL
NRBC # BLD AUTO: 0 K/UL
NRBC BLD-RTO: 0 /100 WBC
NRBC BLD-RTO: 0 /100 WBC
PHOSPHATE SERPL-MCNC: 2.3 MG/DL (ref 2.5–4.5)
PLATELET # BLD AUTO: 110 K/UL (ref 164–446)
PLATELET # BLD AUTO: 129 K/UL (ref 164–446)
PMV BLD AUTO: 9.5 FL (ref 9–12.9)
PMV BLD AUTO: 9.8 FL (ref 9–12.9)
POTASSIUM SERPL-SCNC: 3 MMOL/L (ref 3.6–5.5)
POTASSIUM SERPL-SCNC: 3.5 MMOL/L (ref 3.6–5.5)
PROT SERPL-MCNC: 4.9 G/DL (ref 6–8.2)
PROT SERPL-MCNC: 5.3 G/DL (ref 6–8.2)
RBC # BLD AUTO: 2.78 M/UL (ref 4.2–5.4)
RBC # BLD AUTO: 3.16 M/UL (ref 4.2–5.4)
SODIUM SERPL-SCNC: 138 MMOL/L (ref 135–145)
SODIUM SERPL-SCNC: 139 MMOL/L (ref 135–145)
WBC # BLD AUTO: 12.2 K/UL (ref 4.8–10.8)
WBC # BLD AUTO: 12.9 K/UL (ref 4.8–10.8)

## 2019-12-14 PROCEDURE — 85018 HEMOGLOBIN: CPT

## 2019-12-14 PROCEDURE — 85025 COMPLETE CBC W/AUTO DIFF WBC: CPT

## 2019-12-14 PROCEDURE — 700111 HCHG RX REV CODE 636 W/ 250 OVERRIDE (IP): Performed by: HOSPITALIST

## 2019-12-14 PROCEDURE — 83735 ASSAY OF MAGNESIUM: CPT

## 2019-12-14 PROCEDURE — 700102 HCHG RX REV CODE 250 W/ 637 OVERRIDE(OP): Performed by: INTERNAL MEDICINE

## 2019-12-14 PROCEDURE — A9270 NON-COVERED ITEM OR SERVICE: HCPCS | Performed by: HOSPITALIST

## 2019-12-14 PROCEDURE — 93005 ELECTROCARDIOGRAM TRACING: CPT | Performed by: HOSPITALIST

## 2019-12-14 PROCEDURE — A9270 NON-COVERED ITEM OR SERVICE: HCPCS | Performed by: INTERNAL MEDICINE

## 2019-12-14 PROCEDURE — 99233 SBSQ HOSP IP/OBS HIGH 50: CPT | Performed by: HOSPITALIST

## 2019-12-14 PROCEDURE — 82270 OCCULT BLOOD FECES: CPT

## 2019-12-14 PROCEDURE — 71045 X-RAY EXAM CHEST 1 VIEW: CPT

## 2019-12-14 PROCEDURE — 700111 HCHG RX REV CODE 636 W/ 250 OVERRIDE (IP): Performed by: INTERNAL MEDICINE

## 2019-12-14 PROCEDURE — 700105 HCHG RX REV CODE 258: Performed by: HOSPITALIST

## 2019-12-14 PROCEDURE — 84100 ASSAY OF PHOSPHORUS: CPT

## 2019-12-14 PROCEDURE — 80053 COMPREHEN METABOLIC PANEL: CPT

## 2019-12-14 PROCEDURE — 700102 HCHG RX REV CODE 250 W/ 637 OVERRIDE(OP): Performed by: HOSPITALIST

## 2019-12-14 PROCEDURE — 93010 ELECTROCARDIOGRAM REPORT: CPT | Performed by: INTERNAL MEDICINE

## 2019-12-14 PROCEDURE — 99233 SBSQ HOSP IP/OBS HIGH 50: CPT | Performed by: INTERNAL MEDICINE

## 2019-12-14 PROCEDURE — 85730 THROMBOPLASTIN TIME PARTIAL: CPT

## 2019-12-14 PROCEDURE — 700101 HCHG RX REV CODE 250: Performed by: HOSPITALIST

## 2019-12-14 PROCEDURE — 700105 HCHG RX REV CODE 258: Performed by: INTERNAL MEDICINE

## 2019-12-14 PROCEDURE — 770022 HCHG ROOM/CARE - ICU (200)

## 2019-12-14 PROCEDURE — 85014 HEMATOCRIT: CPT

## 2019-12-14 RX ORDER — POTASSIUM CHLORIDE 20 MEQ/1
40 TABLET, EXTENDED RELEASE ORAL ONCE
Status: COMPLETED | OUTPATIENT
Start: 2019-12-14 | End: 2019-12-14

## 2019-12-14 RX ORDER — ACETAMINOPHEN 325 MG/1
650 TABLET ORAL EVERY 6 HOURS PRN
Status: DISCONTINUED | OUTPATIENT
Start: 2019-12-14 | End: 2019-12-17 | Stop reason: HOSPADM

## 2019-12-14 RX ORDER — MAGNESIUM SULFATE HEPTAHYDRATE 40 MG/ML
2 INJECTION, SOLUTION INTRAVENOUS ONCE
Status: COMPLETED | OUTPATIENT
Start: 2019-12-14 | End: 2019-12-14

## 2019-12-14 RX ORDER — AMIODARONE HYDROCHLORIDE 200 MG/1
400 TABLET ORAL TWICE DAILY
Status: DISCONTINUED | OUTPATIENT
Start: 2019-12-15 | End: 2019-12-17 | Stop reason: HOSPADM

## 2019-12-14 RX ADMIN — HEPARIN SODIUM 950 UNITS/HR: 5000 INJECTION, SOLUTION INTRAVENOUS at 17:03

## 2019-12-14 RX ADMIN — ATORVASTATIN CALCIUM 80 MG: 80 TABLET, FILM COATED ORAL at 16:57

## 2019-12-14 RX ADMIN — POTASSIUM CHLORIDE 40 MEQ: 1500 TABLET, EXTENDED RELEASE ORAL at 05:41

## 2019-12-14 RX ADMIN — MAGNESIUM SULFATE 2 G: 2 INJECTION INTRAVENOUS at 05:44

## 2019-12-14 RX ADMIN — CLOPIDOGREL BISULFATE 75 MG: 75 TABLET ORAL at 05:41

## 2019-12-14 RX ADMIN — ACETAMINOPHEN 650 MG: 325 TABLET, FILM COATED ORAL at 21:46

## 2019-12-14 RX ADMIN — AMIODARONE HYDROCHLORIDE 0.5 MG/MIN: 50 INJECTION, SOLUTION INTRAVENOUS at 10:14

## 2019-12-14 RX ADMIN — POTASSIUM PHOSPHATE, MONOBASIC AND POTASSIUM PHOSPHATE, DIBASIC 30 MMOL: 224; 236 INJECTION, SOLUTION, CONCENTRATE INTRAVENOUS at 13:20

## 2019-12-14 RX ADMIN — MAGNESIUM SULFATE 2 G: 2 INJECTION INTRAVENOUS at 10:09

## 2019-12-14 RX ADMIN — DEXTROSE MONOHYDRATE: 50 INJECTION, SOLUTION INTRAVENOUS at 03:15

## 2019-12-14 ASSESSMENT — ENCOUNTER SYMPTOMS
ORTHOPNEA: 0
FOCAL WEAKNESS: 0
MUSCULOSKELETAL NEGATIVE: 1
NAUSEA: 0
VOMITING: 0
BRUISES/BLEEDS EASILY: 0
SHORTNESS OF BREATH: 0
NECK PAIN: 0
COUGH: 0
HEADACHES: 0
WEAKNESS: 0
RESPIRATORY NEGATIVE: 1
GASTROINTESTINAL NEGATIVE: 1
NEUROLOGICAL NEGATIVE: 1
NERVOUS/ANXIOUS: 0
DEPRESSION: 0
ABDOMINAL PAIN: 0
BLOOD IN STOOL: 0
CONSTIPATION: 0
BACK PAIN: 0
POLYDIPSIA: 0
FEVER: 0
DIZZINESS: 0
PALPITATIONS: 0
CHILLS: 0
EYES NEGATIVE: 1
MEMORY LOSS: 1
CARDIOVASCULAR NEGATIVE: 1
SPUTUM PRODUCTION: 0
HEMOPTYSIS: 0
HEARTBURN: 0

## 2019-12-14 NOTE — PROGRESS NOTES
Dr. Andersen paged and updated on hgb 9.2. Will continue to monitor and order to notify if patient becomes hypotensive or tachycardic.

## 2019-12-14 NOTE — PROGRESS NOTES
"Cardiology Follow-up Consult Note    Date of Service:    12/14/2019      Consulting Physician: Murali Teran M.D.    Patient ID:    Name:   Elvie Otero   YOB: 1940  Age:   79 y.o.  female   MRN:   [id number]      Reason for Consultation: STEMI    HPI/Patient ID:  Elvie Otero is a 79 y.o.-year-old female with a history of hypertension who presented 12/11/2019 with RCA STEMI. Incomplete revascularized and complicated by procedural aortic dissection.    Interim Events:  # Much improved mentation this AM  # Denies chest pain, groin pain, overt bleeding, SOB  # recurrent paroxysmal a fib overnight, most 5 minutes this AM, remains on amiodarone gtt  # hgb down to 8 this AM, repeat 9.       Past medical, surgical, social, and family history reviewed and unchanged from admission except as noted in assessment and plan.    Medications: Reviewed in MAR      Allergies   Allergen Reactions   • Sulfa Drugs Vomiting           Physical Exam  Body mass index is 30.44 kg/m².  BP (!) 92/41   Pulse 74   Temp 36.6 °C (97.9 °F) (Temporal)   Resp (!) 22   Ht 1.575 m (5' 2\")   Wt 75.5 kg (166 lb 7.2 oz)   SpO2 (!) 85%   Vitals:    12/14/19 0700 12/14/19 0800 12/14/19 0900 12/14/19 1000   BP: (!) 97/60 108/46 108/46 (!) 92/41   Pulse: 87 69 77 74   Resp:       Temp:       TempSrc:       SpO2: 95% 96% 96% (!) 85%   Weight:       Height:         Oxygen Therapy:  Pulse Oximetry: (!) 85 %, O2 (LPM): 1, O2 Delivery: Silicone Nasal Cannula    General: No apparent distress.  Eyes: pale conjunctiva  ENT: OP clear  Neck: JVP < 5  Lungs: normal respiratory effort, CTAB  Heart: RRR,  no murmurs, no rubs or gallops, no edema bilateral lower extremities. No LV/RV heave on cardiac palpatation. 2+ left radial pulse.  2+ bilateral dp pulses.   Abdomen: soft, non tender, non distended, no masses, normal bowel sounds.  No HSM.  Extremities/MSK: no clubbing, no cyanosis. Significant but stable ecchymosis at right radial " site. No hematoma right femoral cath site.   Neurological: MANZANO  Psychiatric: A/O x 3, alert.   Skin: Warm extremities    Exam repeated in full and unchanged except for as noted above.      Labs (personally reviewed and notable for):   Lab Results   Component Value Date/Time    SODIUM 139 12/14/2019 03:25 AM    POTASSIUM 3.0 (L) 12/14/2019 03:25 AM    CHLORIDE 109 12/14/2019 03:25 AM    CO2 25 12/14/2019 03:25 AM    GLUCOSE 119 (H) 12/14/2019 03:25 AM    BUN 11 12/14/2019 03:25 AM    CREATININE 0.90 12/14/2019 03:25 AM      Lab Results   Component Value Date/Time    WBC 12.9 (H) 12/14/2019 03:25 AM    RBC 2.78 (L) 12/14/2019 03:25 AM    HEMOGLOBIN 9.2 (L) 12/14/2019 10:22 AM    HEMATOCRIT 26.7 (L) 12/14/2019 10:22 AM    MCV 92.1 12/14/2019 03:25 AM    MCH 30.9 12/14/2019 03:25 AM    MCHC 33.6 12/14/2019 03:25 AM    MPV 9.8 12/14/2019 03:25 AM    NEUTSPOLYS 78.90 (H) 12/14/2019 03:25 AM    LYMPHOCYTES 11.40 (L) 12/14/2019 03:25 AM    MONOCYTES 8.10 12/14/2019 03:25 AM    EOSINOPHILS 0.50 12/14/2019 03:25 AM    BASOPHILS 0.20 12/14/2019 03:25 AM            Cardiac Imaging and Procedures Review:    EKG dated 12/11/2019: My personal interpretation is sinus bradycardia, inferoposterior STEMI    Repeat EKG personally reviewed and showed decreased ST changes.      Echo 12/11/2019:  Echocardiography Laboratory     CONCLUSIONS  No prior study is available for comparison.   Technically difficult study.   Normal left ventricular chamber size.  Inferior wall hypokinesis.  Left ventricular systolic function is normal.  Left ventricular ejection fraction is visually estimated to be 55-60%.  Normal left ventricular wall thickness.  Normal pericardium without effusion.      Radiology test Review:  CXR: reviewed personally and was clear without infiltrates     CT reviewed and showed contained dissection.       Impression and Medical Decision Making:  # Acute inferoposterior STEMI. S/p NILSA x 2. Cath 12/11/2019 complicated by contained  ascending aortic dissection s/p 4mm bare metal stent to ostial RCA. . Incomplete revascularization of distal RCA with continued ST elevation on EKG and inferior WMA.    # Paroxysmal atrial fibrillation with RVR, on amiodarone.   # Hypertension   # Bradycardia, 2nd degree heart block, Mobitz 1, s/p temporary pacemaker. Removed 12/12/2019.   # CODI, resolved but now with oliguria and hypotension.   # Anemia, acute, initially after cath and noted 12/12/2019. Did have CTA that day showing no active bleeding.      Recommendations:  # Heparin gtt for a fib and high CVA risk. We did stop aspirin due to risk of triple therapy.   # plavix 75mg PO Daily  # lipitor 40mg PO daily  # 250cc fluid boluses for hypotension.    # continue to monitor in ICU given anemia, anticipate DC to tele potentially tomorrow.   # holding BB, ARB due to hypotension  # Amio load for a fib. Will transition to PO tonight  # If recurrent hypotension, a fib refractory to amiodarone or chest/abdominal pain, this may all indicate acute bleeding and should be treated aggressively. Given her hgb is relatively stable from the 12th, will defer acute CT imaging at this time. I did send a type and cross today just in case.     Discussed with ICU team.     Thank you for allowing me to participate in the care of this patient, I will continue to follow.  Please contact me with any questions.      Taco Andersen MD  Cardiologist, [facility] [place]   [phone]

## 2019-12-14 NOTE — PROGRESS NOTES
Dr. Andersen paged and updated on patient converting to SR at 1300 with two short bursts of atrial fibrillation that lasted 5-10 minutes each time. Clarified if heparin would still like to be started per the order. Order received to start Heparin gtt per protocol.

## 2019-12-14 NOTE — PROGRESS NOTES
Pt's AM labs showed a potassium of 3.0 and a magnesium of 1.6. Dr. Brendan dale. Verbal order to administer 40 mEq oral potassium and 2 g magnesium sulfate. Order placed.

## 2019-12-14 NOTE — CARE PLAN
Problem: Communication  Goal: The ability to communicate needs accurately and effectively will improve  Outcome: PROGRESSING AS EXPECTED  Intervention: [place] patient and significant other/support system to call light to alert staff of needs  Note:   Pt is asleep, but able to wake up and answer all orientation questions correctly. With increased orientation pt uses call light appropriately though bed alarm is still in use. Will continue to orient and assess as needed.      Problem: Venous Thromboembolism (VTW)/Deep Vein Thrombosis (DVT) Prevention:  Goal: Patient will participate in Venous Thrombosis (VTE)/Deep Vein Thrombosis (DVT)Prevention Measures  Outcome: PROGRESSING AS EXPECTED  Intervention: Ensure patient wears graduated elastic stockings (CARLOS hose) and/or SCDs, if ordered, when in bed or chair (Remove at least once per shift for skin check)  Note:   Pt refusing SCD's, however is mobilizing up to bathroom 5-6 times a shift. Will suggest increased mobilization and sitting up in the chair in the AM.      Problem: Skin Integrity  Goal: Risk for impaired skin integrity will decrease  Outcome: PROGRESSING AS EXPECTED  Intervention: Assess risk factors for impaired skin integrity and/or pressure ulcers  Note:   Scattered bruising on pt's bilateral arms. Right radial hematoma still present and firm. Will continue to monitor and assess. Mepitel lite on.

## 2019-12-14 NOTE — CARE PLAN
Problem: Safety  Goal: Will remain free from injury  Outcome: PROGRESSING AS EXPECTED  Note:   Bed alarm in place to notify RN if patient tries to get out of bed. Patient continually re-oriented throughout the shift.     Problem: Bowel/Gastric:  Goal: Normal bowel function is maintained or improved  Outcome: PROGRESSING AS EXPECTED  Note:   Patient had bowel movement today. No stool softener provided.

## 2019-12-15 LAB
ALBUMIN SERPL BCP-MCNC: 2.9 G/DL (ref 3.2–4.9)
ALBUMIN/GLOB SERPL: 1.3 G/DL
ALP SERPL-CCNC: 58 U/L (ref 30–99)
ALT SERPL-CCNC: 19 U/L (ref 2–50)
ANION GAP SERPL CALC-SCNC: 7 MMOL/L (ref 0–11.9)
APTT PPP: 29 SEC (ref 24.7–36)
AST SERPL-CCNC: 41 U/L (ref 12–45)
BASOPHILS # BLD AUTO: 0.2 % (ref 0–1.8)
BASOPHILS # BLD: 0.03 K/UL (ref 0–0.12)
BILIRUB SERPL-MCNC: 0.8 MG/DL (ref 0.1–1.5)
BUN SERPL-MCNC: 9 MG/DL (ref 8–22)
CALCIUM SERPL-MCNC: 8.2 MG/DL (ref 8.5–10.5)
CHLORIDE SERPL-SCNC: 111 MMOL/L (ref 96–112)
CO2 SERPL-SCNC: 22 MMOL/L (ref 20–33)
CREAT SERPL-MCNC: 0.93 MG/DL (ref 0.5–1.4)
EOSINOPHIL # BLD AUTO: 0.17 K/UL (ref 0–0.51)
EOSINOPHIL NFR BLD: 1.4 % (ref 0–6.9)
ERYTHROCYTE [DISTWIDTH] IN BLOOD BY AUTOMATED COUNT: 42.4 FL (ref 35.9–50)
FERRITIN SERPL-MCNC: 14.7 NG/ML (ref 10–291)
GLOBULIN SER CALC-MCNC: 2.3 G/DL (ref 1.9–3.5)
GLUCOSE SERPL-MCNC: 124 MG/DL (ref 65–99)
HCT VFR BLD AUTO: 23.1 % (ref 37–47)
HCT VFR BLD AUTO: 23.1 % (ref 37–47)
HCT VFR BLD AUTO: 25.6 % (ref 37–47)
HCT VFR BLD AUTO: 30.2 % (ref 37–47)
HGB BLD-MCNC: 10 G/DL (ref 12–16)
HGB BLD-MCNC: 7.9 G/DL (ref 12–16)
HGB BLD-MCNC: 7.9 G/DL (ref 12–16)
HGB BLD-MCNC: 8.4 G/DL (ref 12–16)
IMM GRANULOCYTES # BLD AUTO: 0.12 K/UL (ref 0–0.11)
IMM GRANULOCYTES NFR BLD AUTO: 1 % (ref 0–0.9)
IRON SATN MFR SERPL: 8 % (ref 15–55)
IRON SERPL-MCNC: 18 UG/DL (ref 40–170)
LYMPHOCYTES # BLD AUTO: 1.52 K/UL (ref 1–4.8)
LYMPHOCYTES NFR BLD: 12.1 % (ref 22–41)
MAGNESIUM SERPL-MCNC: 2.4 MG/DL (ref 1.5–2.5)
MCH RBC QN AUTO: 31.5 PG (ref 27–33)
MCHC RBC AUTO-ENTMCNC: 34.2 G/DL (ref 33.6–35)
MCV RBC AUTO: 92 FL (ref 81.4–97.8)
MONOCYTES # BLD AUTO: 0.93 K/UL (ref 0–0.85)
MONOCYTES NFR BLD AUTO: 7.4 % (ref 0–13.4)
NEUTROPHILS # BLD AUTO: 9.79 K/UL (ref 2–7.15)
NEUTROPHILS NFR BLD: 77.9 % (ref 44–72)
NRBC # BLD AUTO: 0 K/UL
NRBC BLD-RTO: 0 /100 WBC
PHOSPHATE SERPL-MCNC: 3.5 MG/DL (ref 2.5–4.5)
PLATELET # BLD AUTO: 153 K/UL (ref 164–446)
PMV BLD AUTO: 9.9 FL (ref 9–12.9)
POTASSIUM SERPL-SCNC: 3.5 MMOL/L (ref 3.6–5.5)
PROT SERPL-MCNC: 5.2 G/DL (ref 6–8.2)
RBC # BLD AUTO: 2.51 M/UL (ref 4.2–5.4)
SODIUM SERPL-SCNC: 140 MMOL/L (ref 135–145)
TIBC SERPL-MCNC: 234 UG/DL (ref 250–450)
WBC # BLD AUTO: 12.6 K/UL (ref 4.8–10.8)

## 2019-12-15 PROCEDURE — A9270 NON-COVERED ITEM OR SERVICE: HCPCS | Performed by: HOSPITALIST

## 2019-12-15 PROCEDURE — 700102 HCHG RX REV CODE 250 W/ 637 OVERRIDE(OP): Performed by: HOSPITALIST

## 2019-12-15 PROCEDURE — 83550 IRON BINDING TEST: CPT

## 2019-12-15 PROCEDURE — 84100 ASSAY OF PHOSPHORUS: CPT

## 2019-12-15 PROCEDURE — 85018 HEMOGLOBIN: CPT

## 2019-12-15 PROCEDURE — 700105 HCHG RX REV CODE 258: Performed by: INTERNAL MEDICINE

## 2019-12-15 PROCEDURE — 700102 HCHG RX REV CODE 250 W/ 637 OVERRIDE(OP): Performed by: INTERNAL MEDICINE

## 2019-12-15 PROCEDURE — 99233 SBSQ HOSP IP/OBS HIGH 50: CPT | Performed by: INTERNAL MEDICINE

## 2019-12-15 PROCEDURE — 770022 HCHG ROOM/CARE - ICU (200)

## 2019-12-15 PROCEDURE — 85730 THROMBOPLASTIN TIME PARTIAL: CPT

## 2019-12-15 PROCEDURE — 83540 ASSAY OF IRON: CPT

## 2019-12-15 PROCEDURE — 85025 COMPLETE CBC W/AUTO DIFF WBC: CPT

## 2019-12-15 PROCEDURE — 700111 HCHG RX REV CODE 636 W/ 250 OVERRIDE (IP): Performed by: INTERNAL MEDICINE

## 2019-12-15 PROCEDURE — 99233 SBSQ HOSP IP/OBS HIGH 50: CPT | Performed by: HOSPITALIST

## 2019-12-15 PROCEDURE — 82728 ASSAY OF FERRITIN: CPT

## 2019-12-15 PROCEDURE — 80053 COMPREHEN METABOLIC PANEL: CPT

## 2019-12-15 PROCEDURE — 85014 HEMATOCRIT: CPT

## 2019-12-15 PROCEDURE — 83735 ASSAY OF MAGNESIUM: CPT

## 2019-12-15 PROCEDURE — A9270 NON-COVERED ITEM OR SERVICE: HCPCS | Performed by: INTERNAL MEDICINE

## 2019-12-15 RX ORDER — POTASSIUM CHLORIDE 20 MEQ/1
40 TABLET, EXTENDED RELEASE ORAL ONCE
Status: COMPLETED | OUTPATIENT
Start: 2019-12-15 | End: 2019-12-15

## 2019-12-15 RX ORDER — ATORVASTATIN CALCIUM 40 MG/1
40 TABLET, FILM COATED ORAL EVERY EVENING
Status: DISCONTINUED | OUTPATIENT
Start: 2019-12-15 | End: 2019-12-17 | Stop reason: HOSPADM

## 2019-12-15 RX ORDER — POTASSIUM CHLORIDE 20 MEQ/1
20 TABLET, EXTENDED RELEASE ORAL DAILY
Status: DISCONTINUED | OUTPATIENT
Start: 2019-12-16 | End: 2019-12-17 | Stop reason: HOSPADM

## 2019-12-15 RX ADMIN — POTASSIUM CHLORIDE 40 MEQ: 1500 TABLET, EXTENDED RELEASE ORAL at 11:14

## 2019-12-15 RX ADMIN — ATORVASTATIN CALCIUM 40 MG: 40 TABLET, FILM COATED ORAL at 17:16

## 2019-12-15 RX ADMIN — AMIODARONE HYDROCHLORIDE 400 MG: 200 TABLET ORAL at 17:16

## 2019-12-15 RX ADMIN — CLOPIDOGREL BISULFATE 75 MG: 75 TABLET ORAL at 05:04

## 2019-12-15 RX ADMIN — AMIODARONE HYDROCHLORIDE 400 MG: 200 TABLET ORAL at 05:05

## 2019-12-15 RX ADMIN — ACETAMINOPHEN 650 MG: 325 TABLET, FILM COATED ORAL at 17:17

## 2019-12-15 RX ADMIN — HEPARIN SODIUM 1150 UNITS/HR: 5000 INJECTION, SOLUTION INTRAVENOUS at 21:32

## 2019-12-15 RX ADMIN — AMIODARONE HYDROCHLORIDE 0.5 MG/MIN: 50 INJECTION, SOLUTION INTRAVENOUS at 02:52

## 2019-12-15 ASSESSMENT — COGNITIVE AND FUNCTIONAL STATUS - GENERAL
SUGGESTED CMS G CODE MODIFIER DAILY ACTIVITY: CH
SUGGESTED CMS G CODE MODIFIER MOBILITY: CH
DAILY ACTIVITIY SCORE: 24
MOBILITY SCORE: 24

## 2019-12-15 ASSESSMENT — ENCOUNTER SYMPTOMS
RESPIRATORY NEGATIVE: 1
CONSTIPATION: 0
BLOOD IN STOOL: 0
SHORTNESS OF BREATH: 0
WEAKNESS: 0
GASTROINTESTINAL NEGATIVE: 1
MEMORY LOSS: 1
NAUSEA: 0
EYES NEGATIVE: 1
HEMOPTYSIS: 0
MUSCULOSKELETAL NEGATIVE: 1
HEADACHES: 0
BRUISES/BLEEDS EASILY: 0
POLYDIPSIA: 0
VOMITING: 0
ORTHOPNEA: 0
DIZZINESS: 0
SPUTUM PRODUCTION: 0
NECK PAIN: 0
PALPITATIONS: 0
CHILLS: 0
BACK PAIN: 0
NEUROLOGICAL NEGATIVE: 1
DEPRESSION: 0
CARDIOVASCULAR NEGATIVE: 1
NERVOUS/ANXIOUS: 0
COUGH: 0
FEVER: 0
ABDOMINAL PAIN: 0
HEARTBURN: 0
FOCAL WEAKNESS: 0

## 2019-12-15 NOTE — PROGRESS NOTES
Hospital Medicine Daily Progress Note    Date of Service  12/14/2019    Chief Complaint  79 y.o. female admitted 12/11/2019 with chest pain and STEMI    Hospital Course    Right coronary STEMI, ascending aortic dissection      Interval Problem Update  Patient seen and examined today. ICU Care  Care and plan discussed in IDT/Hot rounds.  Lines and assistive devices reviewed.    Patient tolerating treatment and therapies.  All Data, Medication data reviewed.  Case discussed with nursing as available.  Plan of Care reviewed with patient and notified of changes.  12/13 the patient became delirious overnight, required several doses of Haldol, this morning as she has episodes of atrial fibrillation with an inferior ST changes, denies chest pain, but her history is currently unreliable secondary to her delirium.  The son is at the bedside.  Discussed with cardiology, EKG does confirm atrial fibrillation with RVR, follow-up laboratory data pending  12/14 patient much improved, mentation appears to be back to normal, sinus rhythm, soft blood pressure, drop in H&H, no obvious evidence of bleeding,  Consultants/Specialty  Radiology    Code Status  Full code    Disposition  ICU    Review of Systems  Review of Systems   Constitutional: Positive for malaise/fatigue. Negative for chills and fever.   HENT: Negative.    Eyes: Negative.    Respiratory: Negative.  Negative for cough, hemoptysis, sputum production and shortness of breath.    Cardiovascular: Negative.  Negative for chest pain, palpitations, orthopnea and leg swelling.   Gastrointestinal: Negative.  Negative for abdominal pain, blood in stool, constipation, heartburn, melena, nausea and vomiting.   Genitourinary: Negative.  Negative for dysuria and frequency.   Musculoskeletal: Negative.  Negative for back pain and neck pain.   Skin: Negative.  Negative for itching and rash.   Neurological: Negative.  Negative for dizziness, focal weakness, weakness and headaches.    Endo/Heme/Allergies: Negative.  Negative for polydipsia. Does not bruise/bleed easily.   Psychiatric/Behavioral: Positive for memory loss. Negative for depression. The patient is not nervous/anxious.         Physical Exam  Temp:  [36.6 °C (97.9 °F)] 36.6 °C (97.9 °F)  Pulse:  [68-87] 74  BP: ()/(41-60) 92/41  SpO2:  [85 %-96 %] 85 %    Physical Exam  Vitals signs and nursing note reviewed.   Constitutional:       Appearance: She is well-developed. She is ill-appearing. She is not diaphoretic.      Interventions: Nasal cannula in place.   HENT:      Head: Normocephalic and atraumatic.      Nose: Nose normal.   Eyes:      Conjunctiva/sclera: Conjunctivae normal.      Pupils: Pupils are equal, round, and reactive to light.   Neck:      Musculoskeletal: Normal range of motion and neck supple.      Thyroid: No thyromegaly.      Vascular: No JVD.   Cardiovascular:      Rate and Rhythm: Tachycardia present. Rhythm irregularly irregular.      Heart sounds: Murmur present. No friction rub. No gallop.    Pulmonary:      Effort: Pulmonary effort is normal.      Breath sounds: Normal breath sounds. No wheezing or rales.   Abdominal:      General: Bowel sounds are normal. There is no distension.      Palpations: Abdomen is soft. There is no mass.      Tenderness: There is no tenderness. There is no guarding or rebound.   Musculoskeletal: Normal range of motion.         General: No tenderness.   Lymphadenopathy:      Cervical: No cervical adenopathy.   Skin:     General: Skin is warm and dry.   Neurological:      Mental Status: She is lethargic and disoriented.      Cranial Nerves: No cranial nerve deficit.   Psychiatric:         Attention and Perception: She is inattentive.         Mood and Affect: Affect is angry.         Speech: Speech is slurred.         Behavior: Behavior normal.         Cognition and Memory: Cognition is impaired. Memory is impaired.         Fluids    Intake/Output Summary (Last 24 hours) at  12/14/2019 1605  Last data filed at 12/14/2019 1000  Gross per 24 hour   Intake 2415.86 ml   Output --   Net 2415.86 ml       Laboratory  Recent Labs     12/11/19  1717 12/12/19  0504 12/14/19  0325 12/14/19  1022   WBC 12.4* 12.8* 12.9*  --    RBC 4.60 3.37* 2.78*  --    HEMOGLOBIN 14.3 10.5* 8.6* 9.2*   HEMATOCRIT 41.6 31.3* 25.6* 26.7*   MCV 90.4 92.6 92.1  --    MCH 31.1 30.9 30.9  --    MCHC 34.4 33.3* 33.6  --    RDW 41.5 42.5 41.8  --    PLATELETCT 244 187 129*  --    MPV 9.1 9.6 9.8  --      Recent Labs     12/12/19  0504 12/13/19  1621 12/14/19  0325   SODIUM 139 138 139   POTASSIUM 4.1 3.5* 3.0*   CHLORIDE 110 110 109   CO2 25 21 25   GLUCOSE 103* 173* 119*   BUN 23* 12 11   CREATININE 0.89 0.90 0.90   CALCIUM 8.6 8.4* 8.4*     Recent Labs     12/11/19  1717  12/13/19  2245 12/14/19  0557 12/14/19  1321   APTT 24.9   < > 77.8* 105.4* 86.9*   INR 1.04  --   --   --   --     < > = values in this interval not displayed.         Recent Labs     12/13/19  1621   TRIGLYCERIDE 66   HDL 48   LDL 59       Imaging  DX-CHEST-PORTABLE (1 VIEW)   Final Result         No acute cardiac or pulmonary abnormality is identified.      CT-CTA COMPLETE THORACOABDOMINAL AORTA   Final Result      1.  Hyperdense material within the ascending aortic wall extending from the aortic root to the distal ascending aorta consistent with intramural hematoma or direct injection of contrast media into the aortic wall from prior catheterization procedure.  No    dissection flap demonstrated.   2.  No abdominal aortic aneurysm or dissection.      These findings were discussed with EDISON ROBERTS on 12/12/2019 12:22 PM.            EC-ECHOCARDIOGRAM LTD W/O CONT   Final Result      DX-CHEST-PORTABLE (1 VIEW)   Final Result      No consolidation.      CL-LEFT HEART CATHETERIZATION WITH POSSIBLE INTERVENTION    (Results Pending)        Assessment/Plan  * Acute ST elevation myocardial infarction (STEMI) of inferoposterior wall (HCC)- (present on  admission)  Assessment & Plan  Admitted for chest pain and STEMI seen on EKG.   Patient status post drug-eluting stent in the RCA as well as bare-metal stent at the ostium   High intensity statin  Beta-blocker        Atrial fibrillation with RVR ([facility])  Assessment & Plan  New onset, amiodarone drip and load  On heparin drip  Transition to oral medication    Ascending aortic dissection ([facility])  Assessment & Plan  CT surgery was consulted and recommended observation  Continue to monitor blood pressure   Does likely as result from right coronary disease  Cardiothoracic surgery was evaluating in conjunction with cardiology, no intervention opted    Second degree heart block- (present on admission)  Assessment & Plan  Likely secondary to RCA lesion  Progressed later to atrial fibrillation  Close monitoring    Anemia  Assessment & Plan  Significant drop in H&H since admission  No overt blood loss noted  Monitor closely  Chest x-ray without mediastinal widening  No other current localizing symptoms    Leukocytosis  Assessment & Plan  Likely secondary to STEMI and infarct    CODI (acute kidney injury) ([facility])  Assessment & Plan  Likely prerenal  IV fluid hydration with normal saline  Monitor BMP and assess response  Avoid IV contrast/nephrotoxins/NSAIDs  Dose adjust meds for decreased GFR      Essential hypertension- (present on admission)  Assessment & Plan  Tight monitoring and control secondary to aortic dissection  Plan  Close ICU observation in light of new onset A. fib with soft blood pressures  Amiodarone bolus and drip, transition to oral medication likely tonight  Further monitoring of what appears to be acute blood loss  Treat delirium, minimize affecting medication, currently improved  Renal function improved  VTE prophylaxis: Heparin, Plavix    Medically complex high risk    I have performed a physical exam and reviewed and updated ROS and Plan today . In review of yesterday's note , there are no changes except as  documented above.

## 2019-12-15 NOTE — PROGRESS NOTES
Dr. Andersen paged to clarify starting PO Amiodarone. Order received for Amiodarone 400mg BID starting at 0600 on 12/15/19 and to turn Amiodarone gtt off at 0800.

## 2019-12-15 NOTE — PROGRESS NOTES
Notified MD Gonda of Hemoglobin of 7.9 and asymptomatic hypotension. Orders to continue to monitor.

## 2019-12-15 NOTE — PROGRESS NOTES
Report received from Rodney RODRIGUEZ. Patient sleeping in bed, connected to monitors, lines and drips verified. Discussed plan of care, verbalizes understanding. Call light within reach. Bed alarm on.

## 2019-12-15 NOTE — ASSESSMENT & PLAN NOTE
Significant drop in H&H since admission  No overt blood loss noted  Monitor closely  Chest x-ray without mediastinal widening  No other current localizing symptoms

## 2019-12-15 NOTE — CARE PLAN
Problem: Safety  Goal: Will remain free from falls  Outcome: PROGRESSING AS EXPECTED  Intervention: Implement fall precautions  Note:   Patient alert and oriented x4. Education provided for patient to use call light when she needs to go to the bathroom. Patient using call light appropriately.     Problem: Mobility  Goal: Risk for activity intolerance will decrease  Outcome: PROGRESSING AS EXPECTED  Note:   Patient stand-by assist from bed to bathroom and back. Patient tolerates well.

## 2019-12-15 NOTE — CARE PLAN
Problem: Safety  Goal: Will remain free from falls  Intervention: Implement fall precautions  Note:   Pt encouraged to use call light.     Problem: Infection  Goal: Will remain free from infection  Intervention: Implement standard precautions and perform hand washing before and after patient contact  Note:   Pt encouraged to wash hands after using bathroom.

## 2019-12-15 NOTE — PROGRESS NOTES
Hospital Medicine Daily Progress Note    Date of Service  12/15/2019    Chief Complaint  79 y.o. female admitted 12/11/2019 with chest pain and STEMI    Hospital Course    Right coronary STEMI, ascending aortic dissection      Interval Problem Update  Patient seen and examined today. ICU Care  Care and plan discussed in IDT/Hot rounds.  Lines and assistive devices reviewed.    Patient tolerating treatment and therapies.  All Data, Medication data reviewed.  Case discussed with nursing as available.  Plan of Care reviewed with patient and notified of changes.  12/13 the patient became delirious overnight, required several doses of Haldol, this morning as she has episodes of atrial fibrillation with an inferior ST changes, denies chest pain, but her history is currently unreliable secondary to her delirium.  The son is at the bedside.  Discussed with cardiology, EKG does confirm atrial fibrillation with RVR, follow-up laboratory data pending  12/14 patient much improved, mentation appears to be back to normal, sinus rhythm, soft blood pressure, drop in H&H, no obvious evidence of bleeding,  12/15 patient feels improved, hemoglobin without major change, follow-up indicating a higher level to be repeated later, replacing potassium, no further chest pain, still some intermittent atrial fibrillation, on oral amiodarone, heparin drip, no evidence of acute blood loss, she had a light brown stool this morning.  Per her report  Consultants/Specialty  Cardiology    Code Status  Full code    Disposition  ICU    Review of Systems  Review of Systems   Constitutional: Positive for malaise/fatigue. Negative for chills and fever.   HENT: Negative.    Eyes: Negative.    Respiratory: Negative.  Negative for cough, hemoptysis, sputum production and shortness of breath.    Cardiovascular: Negative.  Negative for chest pain, palpitations, orthopnea and leg swelling.   Gastrointestinal: Negative.  Negative for abdominal pain, blood in  stool, constipation, heartburn, melena, nausea and vomiting.   Genitourinary: Negative.  Negative for dysuria and frequency.   Musculoskeletal: Negative.  Negative for back pain and neck pain.   Skin: Negative.  Negative for itching and rash.   Neurological: Negative.  Negative for dizziness, focal weakness, weakness and headaches.   Endo/Heme/Allergies: Negative.  Negative for polydipsia. Does not bruise/bleed easily.   Psychiatric/Behavioral: Positive for memory loss. Negative for depression. The patient is not nervous/anxious.         Physical Exam  Temp:  [36.6 °C (97.8 °F)-36.6 °C (97.9 °F)] 36.6 °C (97.8 °F)  Pulse:  [] 67  Resp:  [14-23] 19  BP: ()/(31-72) 102/54  SpO2:  [85 %-97 %] 94 %    Physical Exam  Vitals signs and nursing note reviewed.   Constitutional:       Appearance: She is well-developed. She is ill-appearing. She is not diaphoretic.      Interventions: Nasal cannula in place.   HENT:      Head: Normocephalic and atraumatic.      Nose: Nose normal.   Eyes:      Conjunctiva/sclera: Conjunctivae normal.      Pupils: Pupils are equal, round, and reactive to light.   Neck:      Musculoskeletal: Normal range of motion and neck supple.      Thyroid: No thyromegaly.      Vascular: No JVD.   Cardiovascular:      Rate and Rhythm: Tachycardia present. Rhythm irregularly irregular.      Heart sounds: Murmur present. No friction rub. No gallop.    Pulmonary:      Effort: Pulmonary effort is normal.      Breath sounds: Normal breath sounds. No wheezing or rales.   Abdominal:      General: Bowel sounds are normal. There is no distension.      Palpations: Abdomen is soft. There is no mass.      Tenderness: There is no tenderness. There is no guarding or rebound.   Musculoskeletal: Normal range of motion.         General: No tenderness.   Lymphadenopathy:      Cervical: No cervical adenopathy.   Skin:     General: Skin is warm and dry.   Neurological:      Mental Status: She is lethargic and  disoriented.      Cranial Nerves: No cranial nerve deficit.   Psychiatric:         Attention and Perception: She is inattentive.         Mood and Affect: Affect is angry.         Speech: Speech is slurred.         Behavior: Behavior normal.         Cognition and Memory: Cognition is impaired. Memory is impaired.         Fluids    Intake/Output Summary (Last 24 hours) at 12/15/2019 0811  Last data filed at 12/15/2019 0600  Gross per 24 hour   Intake 2295.28 ml   Output --   Net 2295.28 ml       Laboratory  Recent Labs     12/13/19  0824 12/14/19  0325  12/14/19  1706 12/15/19  0000 12/15/19  0430   WBC 12.2* 12.9*  --   --   --  12.6*   RBC 3.16* 2.78*  --   --   --  2.51*   HEMOGLOBIN 10.0* 8.6*   < > 8.5* 7.9* 7.9*   HEMATOCRIT 31.0* 25.6*   < > 25.8* 23.1* 23.1*   MCV 98.1* 92.1  --   --   --  92.0   MCH 31.6 30.9  --   --   --  31.5   MCHC 32.3* 33.6  --   --   --  34.2   RDW 44.7 41.8  --   --   --  42.4   PLATELETCT 110* 129*  --   --   --  153*   MPV 9.5 9.8  --   --   --  9.9    < > = values in this interval not displayed.     Recent Labs     12/13/19  1621 12/14/19  0325 12/15/19  0430   SODIUM 138 139 140   POTASSIUM 3.5* 3.0* 3.5*   CHLORIDE 110 109 111   CO2 21 25 22   GLUCOSE 173* 119* 124*   BUN 12 11 9   CREATININE 0.90 0.90 0.93   CALCIUM 8.4* 8.4* 8.2*     Recent Labs     12/14/19  0557 12/14/19  1321 12/14/19  1945   APTT 105.4* 86.9* 88.2*         Recent Labs     12/13/19  1621   TRIGLYCERIDE 66   HDL 48   LDL 59       Imaging  DX-CHEST-PORTABLE (1 VIEW)   Final Result         No acute cardiac or pulmonary abnormality is identified.      CT-CTA COMPLETE THORACOABDOMINAL AORTA   Final Result      1.  Hyperdense material within the ascending aortic wall extending from the aortic root to the distal ascending aorta consistent with intramural hematoma or direct injection of contrast media into the aortic wall from prior catheterization procedure.  No    dissection flap demonstrated.   2.  No abdominal  aortic aneurysm or dissection.      These findings were discussed with EDISON ROBERTS on 12/12/2019 12:22 PM.            EC-ECHOCARDIOGRAM LTD W/O CONT   Final Result      DX-CHEST-PORTABLE (1 VIEW)   Final Result      No consolidation.      CL-LEFT HEART CATHETERIZATION WITH POSSIBLE INTERVENTION    (Results Pending)        Assessment/Plan  * Acute ST elevation myocardial infarction (STEMI) of inferoposterior wall (HCC)- (present on admission)  Assessment & Plan  Admitted for chest pain and STEMI seen on EKG.   Patient status post drug-eluting stent in the RCA as well as bare-metal stent at the ostium   High intensity statin  Beta-blocker        Atrial fibrillation with RVR ([facility])  Assessment & Plan  New onset, amiodarone drip and load  On heparin drip  Transition to oral medication    Ascending aortic dissection ([facility])  Assessment & Plan  CT surgery was consulted and recommended observation  Continue to monitor blood pressure   Does likely as result from right coronary disease  Cardiothoracic surgery was evaluating in conjunction with cardiology, no intervention opted    Second degree heart block- (present on admission)  Assessment & Plan  Likely secondary to RCA lesion  Progressed later to atrial fibrillation  Close monitoring    Anemia  Assessment & Plan  Significant drop in H&H since admission  No overt blood loss noted  Monitor closely  Chest x-ray without mediastinal widening  No other current localizing symptoms    Leukocytosis  Assessment & Plan  Likely secondary to STEMI and infarct    CODI (acute kidney injury) ([facility])  Assessment & Plan  Likely prerenal  IV fluid hydration with normal saline  Monitor BMP and assess response  Avoid IV contrast/nephrotoxins/NSAIDs  Dose adjust meds for decreased GFR      Essential hypertension- (present on admission)  Assessment & Plan  Tight monitoring and control secondary to aortic dissection  Plan  Follow-up on H&H and rule out further blood loss, blood pressure remains  soft and with question of GI bleed to remain in ICU    Close ICU observation in light of new onset A. fib with soft blood pressures  Amiodarone bolus and drip, transition to oral medication likely tonight  Further monitoring of what appears to be acute blood loss  Renal function improved  VTE prophylaxis: Heparin, Plavix    Medically complex high risk    I have performed a physical exam and reviewed and updated ROS and Plan today . In review of yesterday's note , there are no changes except as documented above.

## 2019-12-15 NOTE — PROGRESS NOTES
Paged cardiology to update on the hypotension and hemoglobin of 7.9. Orders to continue to monitor.

## 2019-12-15 NOTE — CARE PLAN
Problem: Safety  Goal: Will remain free from injury  Note:   Fall precautions in place, treaded slipper socks, call light within reach     Problem: Mobility  Goal: Risk for activity intolerance will decrease  Note:   Patient stand by assist, calls for assistance

## 2019-12-15 NOTE — PROGRESS NOTES
"Cardiology Follow-up Consult Note    Date of Service:    12/15/2019      Consulting Physician: Murali Teran M.D.    Patient ID:    Name:   Elvie Otero   YOB: 1940  Age:   79 y.o.  female   MRN:   [id number]      Reason for Consultation: STEMI    HPI/Patient ID:  Elvie Otero is a 79 y.o.-year-old female with a history of hypertension who presented 12/11/2019 with RCA STEMI. Incomplete revascularized and complicated by procedural aortic dissection.    Interim Events:  # Much improved mentation this AM  # Denies chest pain, groin pain, overt bleeding, SOB  # hgb down to 7.9, Bps overnight in the 80s/50s.       Past medical, surgical, social, and family history reviewed and unchanged from admission except as noted in assessment and plan.    Medications: Reviewed in MAR      Allergies   Allergen Reactions   • Sulfa Drugs Vomiting           Physical Exam  Body mass index is 29.6 kg/m².  /83   Pulse 82   Temp 36.2 °C (97.2 °F) (Temporal)   Resp 19   Ht 1.575 m (5' 2\")   Wt 73.4 kg (161 lb 13.1 oz)   SpO2 96%   Vitals:    12/15/19 0800 12/15/19 0900 12/15/19 1000 12/15/19 1100   BP: (!) 90/35 (!) 83/50 112/55 136/83   Pulse: 69 82 83 82   Resp:       Temp: 36.2 °C (97.2 °F)      TempSrc: Temporal      SpO2: 94% 98% 97% 96%   Weight:       Height:         Oxygen Therapy:  Pulse Oximetry: 96 %, O2 (LPM): 0, O2 Delivery: None (Room Air)    General: No apparent distress.  Eyes: pale conjunctiva  ENT: OP clear  Neck: JVP < 8  Lungs: normal respiratory effort, CTAB  Heart: RRR,  no murmurs, no rubs or gallops, no edema bilateral lower extremities. No LV/RV heave on cardiac palpatation. 2+ left radial pulse.  2+ bilateral dp pulses.   Abdomen: soft, non tender, non distended, no masses, normal bowel sounds.  No HSM.  Extremities/MSK: no clubbing, no cyanosis. Significant but stable ecchymosis at right radial site.   Neurological: MANZANO  Psychiatric: A/O x 3, alert.   Skin: Warm " extremities    Exam repeated in full and unchanged except for as noted above.      Labs (personally reviewed and notable for):   Lab Results   Component Value Date/Time    SODIUM 140 12/15/2019 04:30 AM    POTASSIUM 3.5 (L) 12/15/2019 04:30 AM    CHLORIDE 111 12/15/2019 04:30 AM    CO2 22 12/15/2019 04:30 AM    GLUCOSE 124 (H) 12/15/2019 04:30 AM    BUN 9 12/15/2019 04:30 AM    CREATININE 0.93 12/15/2019 04:30 AM      Lab Results   Component Value Date/Time    WBC 12.6 (H) 12/15/2019 04:30 AM    RBC 2.51 (L) 12/15/2019 04:30 AM    HEMOGLOBIN 7.9 (L) 12/15/2019 04:30 AM    HEMATOCRIT 23.1 (L) 12/15/2019 04:30 AM    MCV 92.0 12/15/2019 04:30 AM    MCH 31.5 12/15/2019 04:30 AM    MCHC 34.2 12/15/2019 04:30 AM    MPV 9.9 12/15/2019 04:30 AM    NEUTSPOLYS 77.90 (H) 12/15/2019 04:30 AM    LYMPHOCYTES 12.10 (L) 12/15/2019 04:30 AM    MONOCYTES 7.40 12/15/2019 04:30 AM    EOSINOPHILS 1.40 12/15/2019 04:30 AM    BASOPHILS 0.20 12/15/2019 04:30 AM            Cardiac Imaging and Procedures Review:    EKG dated 12/11/2019: My personal interpretation is sinus bradycardia, inferoposterior STEMI    Repeat EKG personally reviewed and showed decreased ST changes.      Echo 12/11/2019:  Echocardiography Laboratory     CONCLUSIONS  No prior study is available for comparison.   Technically difficult study.   Normal left ventricular chamber size.  Inferior wall hypokinesis.  Left ventricular systolic function is normal.  Left ventricular ejection fraction is visually estimated to be 55-60%.  Normal left ventricular wall thickness.  Normal pericardium without effusion.      Radiology test Review:  CXR: reviewed personally and was clear without infiltrates     CT reviewed and showed contained dissection.       Impression and Medical Decision Making:  # Acute inferoposterior STEMI. S/p NILSA x 2. Cath 12/11/2019 complicated by contained ascending aortic dissection s/p 4mm bare metal stent to ostial RCA. . Incomplete revascularization of  distal RCA with continued ST elevation on EKG and inferior WMA.    # Paroxysmal atrial fibrillation with RVR, on amiodarone and heparin  # Hypertension   # Bradycardia, 2nd degree heart block, Mobitz 1, s/p temporary pacemaker. Removed 12/12/2019.   # CODI, resolved but now with oliguria and hypotension.   # Anemia, acute, initially after cath and noted 12/12/2019. Did have CTA that day showing no active bleeding. Now with continued slow bleeding and diagnosis of iron deficiency.      Recommendations:  # Heparin gtt for a fib and high CVA risk. We did stop aspirin due to risk of triple therapy. I recommend one more day of monitoring for bleeding on heparin. If hgb drops <7, she will need transfusion as well as CT of chest, abdomen, and pelvix with contrast to check for RP hematoma and to check the status of her aorta. Otherwise, will transition to apixaban 5mg PO bid tomorrow and I would monitor her for one day prior to potential DC Tuesday.   # plavix 75mg PO Daily  # lipitor 40mg PO daily  # 250cc fluid boluses for hypotension.    # reasonable to transfer to tele at this point.   # holding BB, ARB due to hypotension  # transition to PO amiodarone.   # If recurrent hypotension, a fib refractory to amiodarone or chest/abdominal pain, this may all indicate acute bleeding and should be treated aggressively. Given her hgb is relatively stable from the 12th, will defer acute CT imaging at this time.   # will start IV iron  # recommend urgent outpatient evaluation for GI bleeding given her need for anticoagulation and plavix. Could also be performed as inpatient especially if bleeding continues.     Discussed with ICU team.     Thank you for allowing me to participate in the care of this patient, Dr. Tony Wharton will continue to follow starting tomorrow.  Please contact me with any questions.    Taco Andersen MD  Cardiologist, [facility]   [phone]

## 2019-12-16 LAB
ANION GAP SERPL CALC-SCNC: 8 MMOL/L (ref 0–11.9)
APTT PPP: 98 SEC (ref 24.7–36)
BUN SERPL-MCNC: 11 MG/DL (ref 8–22)
CALCIUM SERPL-MCNC: 8.5 MG/DL (ref 8.5–10.5)
CHLORIDE SERPL-SCNC: 112 MMOL/L (ref 96–112)
CO2 SERPL-SCNC: 21 MMOL/L (ref 20–33)
CREAT SERPL-MCNC: 1.01 MG/DL (ref 0.5–1.4)
EKG IMPRESSION: NORMAL
EKG IMPRESSION: NORMAL
ERYTHROCYTE [DISTWIDTH] IN BLOOD BY AUTOMATED COUNT: 43.7 FL (ref 35.9–50)
GLUCOSE SERPL-MCNC: 105 MG/DL (ref 65–99)
HCT VFR BLD AUTO: 23.3 % (ref 37–47)
HEMOCCULT SP1 STL QL: NEGATIVE
HGB BLD-MCNC: 7.9 G/DL (ref 12–16)
MAGNESIUM SERPL-MCNC: 2 MG/DL (ref 1.5–2.5)
MCH RBC QN AUTO: 31.9 PG (ref 27–33)
MCHC RBC AUTO-ENTMCNC: 33.9 G/DL (ref 33.6–35)
MCV RBC AUTO: 94 FL (ref 81.4–97.8)
PHOSPHATE SERPL-MCNC: 3.2 MG/DL (ref 2.5–4.5)
PLATELET # BLD AUTO: 176 K/UL (ref 164–446)
PMV BLD AUTO: 9.8 FL (ref 9–12.9)
POTASSIUM SERPL-SCNC: 4.3 MMOL/L (ref 3.6–5.5)
RBC # BLD AUTO: 2.48 M/UL (ref 4.2–5.4)
SODIUM SERPL-SCNC: 141 MMOL/L (ref 135–145)
TROPONIN T SERPL-MCNC: 2798 NG/L (ref 6–19)
TROPONIN T SERPL-MCNC: 2928 NG/L (ref 6–19)
WBC # BLD AUTO: 10.8 K/UL (ref 4.8–10.8)

## 2019-12-16 PROCEDURE — 85730 THROMBOPLASTIN TIME PARTIAL: CPT

## 2019-12-16 PROCEDURE — 93010 ELECTROCARDIOGRAM REPORT: CPT | Performed by: INTERNAL MEDICINE

## 2019-12-16 PROCEDURE — 90662 IIV NO PRSV INCREASED AG IM: CPT | Performed by: HOSPITALIST

## 2019-12-16 PROCEDURE — 700111 HCHG RX REV CODE 636 W/ 250 OVERRIDE (IP): Mod: JG | Performed by: INTERNAL MEDICINE

## 2019-12-16 PROCEDURE — 3E02340 INTRODUCTION OF INFLUENZA VACCINE INTO MUSCLE, PERCUTANEOUS APPROACH: ICD-10-PCS | Performed by: HOSPITALIST

## 2019-12-16 PROCEDURE — 97161 PT EVAL LOW COMPLEX 20 MIN: CPT

## 2019-12-16 PROCEDURE — 84484 ASSAY OF TROPONIN QUANT: CPT | Mod: 91

## 2019-12-16 PROCEDURE — 700111 HCHG RX REV CODE 636 W/ 250 OVERRIDE (IP): Performed by: HOSPITALIST

## 2019-12-16 PROCEDURE — 83735 ASSAY OF MAGNESIUM: CPT

## 2019-12-16 PROCEDURE — 85027 COMPLETE CBC AUTOMATED: CPT

## 2019-12-16 PROCEDURE — 700102 HCHG RX REV CODE 250 W/ 637 OVERRIDE(OP): Performed by: INTERNAL MEDICINE

## 2019-12-16 PROCEDURE — 84100 ASSAY OF PHOSPHORUS: CPT

## 2019-12-16 PROCEDURE — A9270 NON-COVERED ITEM OR SERVICE: HCPCS | Performed by: INTERNAL MEDICINE

## 2019-12-16 PROCEDURE — 700105 HCHG RX REV CODE 258: Performed by: INTERNAL MEDICINE

## 2019-12-16 PROCEDURE — 93005 ELECTROCARDIOGRAM TRACING: CPT | Performed by: INTERNAL MEDICINE

## 2019-12-16 PROCEDURE — 80048 BASIC METABOLIC PNL TOTAL CA: CPT

## 2019-12-16 PROCEDURE — 700102 HCHG RX REV CODE 250 W/ 637 OVERRIDE(OP): Performed by: HOSPITALIST

## 2019-12-16 PROCEDURE — A9270 NON-COVERED ITEM OR SERVICE: HCPCS | Performed by: HOSPITALIST

## 2019-12-16 PROCEDURE — 90471 IMMUNIZATION ADMIN: CPT

## 2019-12-16 PROCEDURE — 770020 HCHG ROOM/CARE - TELE (206)

## 2019-12-16 PROCEDURE — 99233 SBSQ HOSP IP/OBS HIGH 50: CPT | Performed by: HOSPITALIST

## 2019-12-16 PROCEDURE — 99233 SBSQ HOSP IP/OBS HIGH 50: CPT | Performed by: INTERNAL MEDICINE

## 2019-12-16 RX ORDER — ACETAMINOPHEN 325 MG/1
650 TABLET ORAL ONCE
Status: COMPLETED | OUTPATIENT
Start: 2019-12-16 | End: 2019-12-16

## 2019-12-16 RX ORDER — DIPHENHYDRAMINE HYDROCHLORIDE 50 MG/ML
25 INJECTION INTRAMUSCULAR; INTRAVENOUS ONCE
Status: COMPLETED | OUTPATIENT
Start: 2019-12-16 | End: 2019-12-16

## 2019-12-16 RX ORDER — DIPHENHYDRAMINE HCL 25 MG
25 TABLET ORAL ONCE
Status: COMPLETED | OUTPATIENT
Start: 2019-12-16 | End: 2019-12-16

## 2019-12-16 RX ORDER — CLOPIDOGREL BISULFATE 75 MG/1
75 TABLET ORAL DAILY
Qty: 30 TAB | Refills: 0 | Status: SHIPPED
Start: 2019-12-16 | End: 2019-12-27 | Stop reason: SDUPTHER

## 2019-12-16 RX ADMIN — SODIUM CHLORIDE 25 MG: 9 INJECTION, SOLUTION INTRAVENOUS at 14:08

## 2019-12-16 RX ADMIN — AMIODARONE HYDROCHLORIDE 400 MG: 200 TABLET ORAL at 05:48

## 2019-12-16 RX ADMIN — ACETAMINOPHEN 650 MG: 325 TABLET, FILM COATED ORAL at 12:58

## 2019-12-16 RX ADMIN — APIXABAN 5 MG: 5 TABLET, FILM COATED ORAL at 17:32

## 2019-12-16 RX ADMIN — POTASSIUM CHLORIDE 20 MEQ: 1500 TABLET, EXTENDED RELEASE ORAL at 05:48

## 2019-12-16 RX ADMIN — APIXABAN 5 MG: 5 TABLET, FILM COATED ORAL at 12:58

## 2019-12-16 RX ADMIN — AMIODARONE HYDROCHLORIDE 400 MG: 200 TABLET ORAL at 17:32

## 2019-12-16 RX ADMIN — ATORVASTATIN CALCIUM 40 MG: 40 TABLET, FILM COATED ORAL at 17:32

## 2019-12-16 RX ADMIN — CLOPIDOGREL BISULFATE 75 MG: 75 TABLET ORAL at 05:48

## 2019-12-16 RX ADMIN — INFLUENZA A VIRUS A/MICHIGAN/45/2015 X-275 (H1N1) ANTIGEN (FORMALDEHYDE INACTIVATED), INFLUENZA A VIRUS A/SINGAPORE/INFIMH-16-0019/2016 IVR-186 (H3N2) ANTIGEN (FORMALDEHYDE INACTIVATED), AND INFLUENZA B VIRUS B/MARYLAND/15/2016 BX-69A (A B/COLORADO/6/2017-LIKE VIRUS) ANTIGEN (FORMALDEHYDE INACTIVATED) 0.5 ML: 60; 60; 60 INJECTION, SUSPENSION INTRAMUSCULAR at 17:32

## 2019-12-16 RX ADMIN — SODIUM CHLORIDE 1375 MG: 9 INJECTION, SOLUTION INTRAVENOUS at 17:31

## 2019-12-16 RX ADMIN — DIPHENHYDRAMINE HCL 25 MG: 25 TABLET ORAL at 12:58

## 2019-12-16 ASSESSMENT — ENCOUNTER SYMPTOMS
RESPIRATORY NEGATIVE: 1
BACK PAIN: 0
HEADACHES: 0
BRUISES/BLEEDS EASILY: 0
HEMOPTYSIS: 0
CONSTIPATION: 0
DIZZINESS: 0
CHILLS: 0
SPUTUM PRODUCTION: 0
MUSCULOSKELETAL NEGATIVE: 1
SHORTNESS OF BREATH: 0
ABDOMINAL PAIN: 0
NECK PAIN: 0
ORTHOPNEA: 0
MEMORY LOSS: 1
PALPITATIONS: 0
COUGH: 0
HEARTBURN: 0
BLOOD IN STOOL: 0
CARDIOVASCULAR NEGATIVE: 1
VOMITING: 0
EYES NEGATIVE: 1
NERVOUS/ANXIOUS: 0
NAUSEA: 0
POLYDIPSIA: 0
GASTROINTESTINAL NEGATIVE: 1
FOCAL WEAKNESS: 0
NEUROLOGICAL NEGATIVE: 1
FEVER: 0
WEAKNESS: 0
DEPRESSION: 0

## 2019-12-16 ASSESSMENT — LIFESTYLE VARIABLES
TOTAL SCORE: 0
HAVE YOU EVER FELT YOU SHOULD CUT DOWN ON YOUR DRINKING: NO
CONSUMPTION TOTAL: NEGATIVE
TOTAL SCORE: 0
ON A TYPICAL DAY WHEN YOU DRINK ALCOHOL HOW MANY DRINKS DO YOU HAVE: 1
TOTAL SCORE: 0
DOES PATIENT WANT TO STOP DRINKING: NO
HOW MANY TIMES IN THE PAST YEAR HAVE YOU HAD 5 OR MORE DRINKS IN A DAY: 0
EVER HAD A DRINK FIRST THING IN THE MORNING TO STEADY YOUR NERVES TO GET RID OF A HANGOVER: NO
ALCOHOL_USE: YES
HAVE PEOPLE ANNOYED YOU BY CRITICIZING YOUR DRINKING: NO
EVER FELT BAD OR GUILTY ABOUT YOUR DRINKING: NO
AVERAGE NUMBER OF DAYS PER WEEK YOU HAVE A DRINK CONTAINING ALCOHOL: 1

## 2019-12-16 ASSESSMENT — COGNITIVE AND FUNCTIONAL STATUS - GENERAL
SUGGESTED CMS G CODE MODIFIER MOBILITY: CH
MOBILITY SCORE: 24

## 2019-12-16 ASSESSMENT — GAIT ASSESSMENTS
GAIT LEVEL OF ASSIST: SUPERVISED
DISTANCE (FEET): 300

## 2019-12-16 NOTE — PROGRESS NOTES
IV Iron Per Pharmacy Note    Patient Lean Body Weight:  46.7 kg  Reason for Iron Replacement: Iron Deficiency Anemia      Lab Results   Component Value Date/Time    WBC 10.8 12/16/2019 04:00 AM    RBC 2.48 (L) 12/16/2019 04:00 AM    HEMOGLOBIN 7.9 (L) 12/16/2019 04:00 AM    HEMATOCRIT 23.3 (L) 12/16/2019 04:00 AM    MCV 94.0 12/16/2019 04:00 AM    MCH 31.9 12/16/2019 04:00 AM    MCHC 33.9 12/16/2019 04:00 AM    MPV 9.8 12/16/2019 04:00 AM       Recent Labs     12/15/19  0430   FERRITIN 14.7   IRON 18*         Recent Labs     12/16/19  0400   CREATININE 1.01          Assessment/Plan:  1. IV Iron Indicated. Will dose iron dextran due to anticipated length of stay < 5 days.  2. Give Iron Dextran 25 mg IV test dose following diphenhydramine/acetaminophen premeds over 30 minutes per protocol.  3. If no reaction (Anaphylaxis, Hypotension/Hypertension, N/V/D, Chest pain/Back Pain, Urticaria/Pruritis) in the next hour, proceed to full dose. Nursing to call the pharmacy IV room at ext. [phone] for full dose.  4. Full dose: Iron Dextran 1375 mg IV over 4 hours. Continue to monitor for delayed ADR including: Arthralgia/myalgia, Headache/backache, chills/dizziness/malaise, moderate to high fever and n/v.      Kaley Trinidad, PharmD, BCCCP

## 2019-12-16 NOTE — CARE PLAN
Problem: Infection  Goal: Will remain free from infection  Intervention: Implement standard precautions and perform hand washing before and after patient contact  Note:   Pt encouraged to wash hands after using the restroom.      Problem: Pain Management  Goal: Pain level will decrease to patient's comfort goal  Intervention: Follow pain managment plan developed in collaboration with patient and Interdisciplinary Team  Note:   Pt encouraged to participate in pain management plan.

## 2019-12-16 NOTE — PROGRESS NOTES
Notified Dr. Wharton regarding patient's increased ST elevation and ST depression on 1223 EKG. Patient is asymptomatic, denies chest pain, vital signs stable, no SOB, N/V, or dizziness. Orders for troponin now, and additional troponin and EKG at 1600.

## 2019-12-16 NOTE — PROGRESS NOTES
Hospital Medicine Daily Progress Note    Date of Service  12/16/2019    Chief Complaint  79 y.o. female admitted 12/11/2019 with chest pain and STEMI    Hospital Course    Right coronary STEMI, ascending aortic dissection      Interval Problem Update  Patient seen and examined today. ICU Care  Care and plan discussed in IDT/Hot rounds.  Lines and assistive devices reviewed.    Patient tolerating treatment and therapies.  All Data, Medication data reviewed.  Case discussed with nursing as available.  Plan of Care reviewed with patient and notified of changes.  12/13 the patient became delirious overnight, required several doses of Haldol, this morning as she has episodes of atrial fibrillation with an inferior ST changes, denies chest pain, but her history is currently unreliable secondary to her delirium.  The son is at the bedside.  Discussed with cardiology, EKG does confirm atrial fibrillation with RVR, follow-up laboratory data pending  12/14 patient much improved, mentation appears to be back to normal, sinus rhythm, soft blood pressure, drop in H&H, no obvious evidence of bleeding,  12/15 patient feels improved, hemoglobin without major change, follow-up indicating a higher level to be repeated later, replacing potassium, no further chest pain, still some intermittent atrial fibrillation, on oral amiodarone, heparin drip, no evidence of acute blood loss, she had a light brown stool this morning.  Per her report  12/16 the patient was improving, she denies chest pain, she had regular bowel movement without evidence of bleeding, her hemoglobin has overall stabilized, discussed with cardiology, patient will be challenged with Eliquis at this time and observe closely, patient remains in a sinus rhythm with ectopy and soft blood pressures are overall.  Patient complained of poor sleep during hospitalization consultants/Specialty  Cardiology    Code Status  Full code    Disposition  ICU with okay to  telemetry    Review of Systems  Review of Systems   Constitutional: Positive for malaise/fatigue. Negative for chills and fever.   HENT: Negative.    Eyes: Negative.    Respiratory: Negative.  Negative for cough, hemoptysis, sputum production and shortness of breath.    Cardiovascular: Negative.  Negative for chest pain, palpitations, orthopnea and leg swelling.   Gastrointestinal: Negative.  Negative for abdominal pain, blood in stool, constipation, heartburn, melena, nausea and vomiting.   Genitourinary: Negative.  Negative for dysuria and frequency.   Musculoskeletal: Negative.  Negative for back pain and neck pain.   Skin: Negative.  Negative for itching and rash.   Neurological: Negative.  Negative for dizziness, focal weakness, weakness and headaches.   Endo/Heme/Allergies: Negative.  Negative for polydipsia. Does not bruise/bleed easily.   Psychiatric/Behavioral: Positive for memory loss. Negative for depression. The patient is not nervous/anxious.         Physical Exam  Temp:  [36.1 °C (97 °F)-36.6 °C (97.9 °F)] 36.6 °C (97.9 °F)  Pulse:  [68-92] 69  Resp:  [10-21] 15  BP: ()/(41-83) 92/50  SpO2:  [92 %-98 %] 95 %    Physical Exam  Vitals signs and nursing note reviewed.   Constitutional:       Appearance: She is well-developed. She is ill-appearing. She is not diaphoretic.      Interventions: Nasal cannula in place.   HENT:      Head: Normocephalic and atraumatic.      Nose: Nose normal.   Eyes:      Conjunctiva/sclera: Conjunctivae normal.      Pupils: Pupils are equal, round, and reactive to light.   Neck:      Musculoskeletal: Normal range of motion and neck supple.      Thyroid: No thyromegaly.      Vascular: No JVD.   Cardiovascular:      Rate and Rhythm: Normal rate and regular rhythm.      Heart sounds: Murmur present. No friction rub. No gallop.    Pulmonary:      Effort: Pulmonary effort is normal.      Breath sounds: Normal breath sounds. No wheezing or rales.   Abdominal:      General:  Bowel sounds are normal. There is no distension.      Palpations: Abdomen is soft. There is no mass.      Tenderness: There is no tenderness. There is no guarding or rebound.   Musculoskeletal: Normal range of motion.         General: No tenderness.   Lymphadenopathy:      Cervical: No cervical adenopathy.   Skin:     General: Skin is warm and dry.   Neurological:      Mental Status: She is alert and oriented to person, place, and time. Mental status is at baseline.      Cranial Nerves: No cranial nerve deficit.   Psychiatric:         Attention and Perception: Attention normal.         Mood and Affect: Mood normal.         Speech: Speech normal.         Behavior: Behavior normal.         Cognition and Memory: Cognition normal.         Fluids    Intake/Output Summary (Last 24 hours) at 12/16/2019 0843  Last data filed at 12/16/2019 0800  Gross per 24 hour   Intake 1114.2 ml   Output --   Net 1114.2 ml       Laboratory  Recent Labs     12/14/19  0325  12/15/19  0430 12/15/19  1115 12/15/19  2000 12/16/19  0400   WBC 12.9*  --  12.6*  --   --  10.8   RBC 2.78*  --  2.51*  --   --  2.48*   HEMOGLOBIN 8.6*   < > 7.9* 10.0* 8.4* 7.9*   HEMATOCRIT 25.6*   < > 23.1* 30.2* 25.6* 23.3*   MCV 92.1  --  92.0  --   --  94.0   MCH 30.9  --  31.5  --   --  31.9   MCHC 33.6  --  34.2  --   --  33.9   RDW 41.8  --  42.4  --   --  43.7   PLATELETCT 129*  --  153*  --   --  176   MPV 9.8  --  9.9  --   --  9.8    < > = values in this interval not displayed.     Recent Labs     12/14/19  0325 12/15/19  0430 12/16/19  0400   SODIUM 139 140 141   POTASSIUM 3.0* 3.5* 4.3   CHLORIDE 109 111 112   CO2 25 22 21   GLUCOSE 119* 124* 105*   BUN 11 9 11   CREATININE 0.90 0.93 1.01   CALCIUM 8.4* 8.2* 8.5     Recent Labs     12/14/19  1945 12/15/19  2000 12/16/19  0400   APTT 88.2* 29.0 98.0*         Recent Labs     12/13/19  1621   TRIGLYCERIDE 66   HDL 48   LDL 59       Imaging  DX-CHEST-PORTABLE (1 VIEW)   Final Result         No acute cardiac  or pulmonary abnormality is identified.      CT-CTA COMPLETE THORACOABDOMINAL AORTA   Final Result      1.  Hyperdense material within the ascending aortic wall extending from the aortic root to the distal ascending aorta consistent with intramural hematoma or direct injection of contrast media into the aortic wall from prior catheterization procedure.  No    dissection flap demonstrated.   2.  No abdominal aortic aneurysm or dissection.      These findings were discussed with EDISON ROBERTS on 12/12/2019 12:22 PM.            EC-ECHOCARDIOGRAM LTD W/O CONT   Final Result      DX-CHEST-PORTABLE (1 VIEW)   Final Result      No consolidation.      CL-LEFT HEART CATHETERIZATION WITH POSSIBLE INTERVENTION    (Results Pending)        Assessment/Plan  * Acute ST elevation myocardial infarction (STEMI) of inferoposterior wall (HCC)- (present on admission)  Assessment & Plan  Admitted for chest pain and STEMI seen on EKG.   Patient status post drug-eluting stent in the RCA as well as bare-metal stent at the ostium   High intensity statin  Patient did not tolerate beta blockade, is currently on amiodarone with soft blood pressures        Atrial fibrillation with RVR (HCC)  Assessment & Plan  New onset, amiodarone drip and load  On heparin drip  Transition to oral medication    Ascending aortic dissection (HCC)  Assessment & Plan  CT surgery was consulted and recommended observation  Continue to monitor blood pressure   Does likely as result from right coronary disease  Cardiothoracic surgery was evaluating in conjunction with cardiology, no intervention opted    Second degree heart block- (present on admission)  Assessment & Plan  Likely secondary to RCA lesion  Progressed later to atrial fibrillation  Close monitoring    Anemia  Assessment & Plan  Significant drop in H&H since admission  No overt blood loss noted  Monitor closely  Chest x-ray without mediastinal widening  No other current localizing  symptoms    Leukocytosis  Assessment & Plan  Likely secondary to STEMI and infarct    CODI (acute kidney injury) (HCC)  Assessment & Plan  Likely prerenal  IV fluid hydration with normal saline  Monitor BMP and assess response  Avoid IV contrast/nephrotoxins/NSAIDs  Dose adjust meds for decreased GFR      Essential hypertension- (present on admission)  Assessment & Plan  Tight monitoring and control secondary to aortic dissection  Plan  Change heparin to Eliquis and to monitor H&H closely for tolerance  Evaluate for additional blood loss  Continue amiodarone  Discussed with cardiology, for current time the patient on Plavix only given the need for Eliquis, no aspirin for the time being, he stated to discuss with interventional cardiology if strong argument for triple therapy  Amiodarone changed to p.o. now  Increase activity, possible discharge within the next 1 or 2 days  VTE prophylaxis: Eliquis Plavix    Medically complex high risk    I have performed a physical exam and reviewed and updated ROS and Plan today . In review of yesterday's note , there are no changes except as documented above.

## 2019-12-16 NOTE — THERAPY
"Physical Therapy Evaluation completed.   Bed Mobility:  Supine to Sit: Supervised  Transfers: Sit to Stand: Supervised  Gait: Level Of Assist: Supervised with No Equipment Needed       Plan of Care: Patient with no further skilled PT needs in the acute care setting at this time  Discharge Recommendations: Equipment: No Equipment Needed. Post-acute therapy Discharge to home with outpatient cardiac rehab for additional skilled therapy services.    Pt is an 78 yo male admitted with stemi, now s/p 2.25x18 mm Bulmaro NILSA to dist RCA into PLB and PTCA of PDA  and S/p 4x23 mm bare metal stent to ostial RCA. Today, pt is supervised with all activity and tolerates ambulation x 300 using no AD with no symptoms, no need for rest. Pt was educated in phase I cardiac rehab including establishing a walking program with gradual progression, RPE scale, talk test and phase II cardiac rehab was introduced. Pt lives alone, adult kids are here to visit. No further inpt PT needs.      See \"Rehab Therapy-Acute\" Patient Summary Report for complete documentation.     "

## 2019-12-16 NOTE — PROGRESS NOTES
Report received from Rodney RODRIGUEZ. Patient sleeping in bed, connected to monitors, lines and drips verified. Discussed plan of care, verbalizes understanding. Call light within reach.

## 2019-12-16 NOTE — PROGRESS NOTES
Stat page out to Dr. Wharton regarding critical troponin 4585. RN supervisor sent tiger text as well.

## 2019-12-16 NOTE — PROGRESS NOTES
CC:   Chief Complaint   Patient presents with   • Chest Pain       HPI:      79 year old woman with PMH HTN presents with chest pain and found inf STEMI s/p partial revascularization c/b aortic dissection.    Feeling well today. States her presenting symptoms have resolved. She has not had any recurrent chest pain. Denies light headedness, shortness of breath, palpitations, orthopnea, PND.    Medications / Drug list prior to admission:  No current facility-administered medications on file prior to encounter.      Current Outpatient Medications on File Prior to Encounter   Medication Sig Dispense Refill   • atenolol (TENORMIN) 50 MG Tab Take 50 mg by mouth every day.         Current list of administered Medications:    Current Facility-Administered Medications:   •  apixaban (ELIQUIS) tablet 5 mg, 5 mg, Oral, BID, Murali Teran M.D.  •  potassium chloride SA (Kdur) tablet 20 mEq, 20 mEq, Oral, DAILY, Murali Teran M.D., 20 mEq at 12/16/19 0548  •  atorvastatin (LIPITOR) tablet 40 mg, 40 mg, Oral, Q EVENING, Taco Anderesn M.D., 40 mg at 12/15/19 1716  •  amiodarone (CORDARONE) tablet 400 mg, 400 mg, Oral, TWICE DAILY, Taco Andersen M.D., 400 mg at 12/16/19 0548  •  acetaminophen (TYLENOL) tablet 650 mg, 650 mg, Oral, Q6HRS PRN, Fabby Cardona M.D., 650 mg at 12/15/19 1717  •  clopidogrel (PLAVIX) tablet 75 mg, 75 mg, Oral, DAILY, Taco Andersen M.D., 75 mg at 12/16/19 0548  •  senna-docusate (PERICOLACE or SENOKOT S) 8.6-50 MG per tablet 2 Tab, 2 Tab, Oral, BID, Stopped at 12/13/19 1800 **AND** polyethylene glycol/lytes (MIRALAX) PACKET 1 Packet, 1 Packet, Oral, QDAY PRN **AND** magnesium hydroxide (MILK OF MAGNESIA) suspension 30 mL, 30 mL, Oral, QDAY PRN **AND** bisacodyl (DULCOLAX) suppository 10 mg, 10 mg, Rectal, QDAY PRN, Fabby Cardona M.D.  •  Respiratory Care per Protocol, , Nebulization, Continuous RT, Fabby Cardona M.D.  •  ondansetron (ZOFRAN) syringe/vial injection 4 mg, 4 mg,  Intravenous, Q4HRS PRN, Fabby Cardona M.D.  •  ondansetron (ZOFRAN ODT) dispertab 4 mg, 4 mg, Oral, Q4HRS PRN, Fabby Cardona M.D.    Past Medical History:   Diagnosis Date   • Heart burn    • Hypertension        Past Surgical History:   Procedure Laterality Date   • RHYTIDECTOMY  10/15/2012    Performed by Nelsy Sun M.D. at SURGERY [facility]   • BLEPHAROPLASTY  10/15/2012    Performed by Nelsy Sun M.D. at SURGERY [facility] ORS   • CHOLECYSTECTOMY  2004   • HYSTERECTOMY LAPAROSCOPY  1975       No family history on file.  Patient family history was personally reviewed, no pertinent family history to current presentation    Social History     Socioeconomic History   • Marital status:      Spouse name: Not on file   • Number of children: Not on file   • Years of education: Not on file   • Highest education level: Not on file   Occupational History   • Not on file   Social Needs   • Financial resource strain: Not on file   • Food insecurity:     Worry: Not on file     Inability: Not on file   • Transportation needs:     Medical: Not on file     Non-medical: Not on file   Tobacco Use   • Smoking status: Never Smoker   • Smokeless tobacco: Never Used   Substance and Sexual Activity   • Alcohol use: Yes     Comment: 1 per week   • Drug use: No   • Sexual activity: Not on file   Lifestyle   • Physical activity:     Days per week: Not on file     Minutes per session: Not on file   • Stress: Not on file   Relationships   • Social connections:     Talks on phone: Not on file     Gets together: Not on file     Attends [employer] service: Not on file     Active member of club or organization: Not on file     Attends meetings of clubs or organizations: Not on file     Relationship status: Not on file   • Intimate partner violence:     Fear of current or ex partner: Not on file     Emotionally abused: Not on file     Physically abused: Not on file     Forced sexual activity: Not on file    Other Topics Concern   • Not on file   Social History Narrative   • Not on file       ALLERGIES:  Allergies   Allergen Reactions   • Sulfa Drugs Vomiting       Review of systems:  A complete review of symptoms was reviewed with patient. This is reviewed in H&P and PMH. ALL OTHERS reviewed and negative    Physical exam:  Patient Vitals for the past 24 hrs:   BP Temp Temp src Pulse Resp SpO2 Weight   12/16/19 0700 (!) 92/50 -- -- 69 15 95 % --   12/16/19 0600 118/57 -- -- 78 20 95 % --   12/16/19 0548 (!) 98/57 -- -- 81 -- -- --   12/16/19 0500 (!) 98/57 -- -- 73 19 96 % 74.1 kg (163 lb 5.8 oz)   12/16/19 0400 106/56 36.6 °C (97.9 °F) Temporal 74 16 95 % --   12/16/19 0300 105/49 -- -- 68 17 95 % --   12/16/19 0200 (!) 98/64 -- -- 73 (!) 10 93 % --   12/16/19 0100 (!) 94/53 -- -- 78 16 94 % --   12/16/19 0000 (!) 94/53 36.6 °C (97.8 °F) Temporal 74 (!) 21 92 % --   12/15/19 2300 116/41 -- -- 81 15 93 % --   12/15/19 2200 122/53 -- -- 83 19 92 % --   12/15/19 2000 124/71 36.4 °C (97.6 °F) Temporal 84 20 93 % --   12/15/19 1800 -- -- -- 79 -- 95 % --   12/15/19 1700 125/64 -- -- 79 -- 95 % --   12/15/19 1600 132/64 36.3 °C (97.4 °F) Temporal 92 -- 94 % --   12/15/19 1500 111/44 -- -- 80 -- 95 % --   12/15/19 1400 129/59 -- -- 90 -- 96 % --   12/15/19 1300 (!) 96/48 -- -- 83 -- 97 % --   12/15/19 1200 (!) 96/48 36.1 °C (97 °F) Temporal 75 -- 97 % --   12/15/19 1100 136/83 -- -- 82 -- 96 % --   12/15/19 1000 112/55 -- -- 83 -- 97 % --     General: No acute distress.   EYES: no jaundice  HEENT: OP clear   Neck: No bruits No JVD.   CVS:  RRR. S1 + S2. No M/R/G. No edema. RRA echymotic, RFA echymosis no obvious hematoma  Resp: CTAB. No wheezing or crackles/rhonchi.  Abdomen: Soft, NT, ND,  Skin: Grossly nothing acute no obvious rashes  Neurological: Alert, Moves all extremities, no cranial nerve defects on limited exam  Extremities: Pulse 2+ in b/l LE. No cyanosis.       Data:  Laboratory studies personally reviewed by  me:  Recent Results (from the past 24 hour(s))   HEMOGLOBIN AND HEMATOCRIT    Collection Time: 12/15/19 11:15 AM   Result Value Ref Range    Hemoglobin 10.0 (L) 12.0 - 16.0 g/dL    Hematocrit 30.2 (L) 37.0 - 47.0 %   APTT    Collection Time: 12/15/19  8:00 PM   Result Value Ref Range    APTT 29.0 24.7 - 36.0 sec   HEMOGLOBIN AND HEMATOCRIT    Collection Time: 12/15/19  8:00 PM   Result Value Ref Range    Hemoglobin 8.4 (L) 12.0 - 16.0 g/dL    Hematocrit 25.6 (L) 37.0 - 47.0 %   Basic Metabolic Panel    Collection Time: 12/16/19  4:00 AM   Result Value Ref Range    Sodium 141 135 - 145 mmol/L    Potassium 4.3 3.6 - 5.5 mmol/L    Chloride 112 96 - 112 mmol/L    Co2 21 20 - 33 mmol/L    Glucose 105 (H) 65 - 99 mg/dL    Bun 11 8 - 22 mg/dL    Creatinine 1.01 0.50 - 1.40 mg/dL    Calcium 8.5 8.5 - 10.5 mg/dL    Anion Gap 8.0 0.0 - 11.9   CBC WITHOUT DIFFERENTIAL    Collection Time: 12/16/19  4:00 AM   Result Value Ref Range    WBC 10.8 4.8 - 10.8 K/uL    RBC 2.48 (L) 4.20 - 5.40 M/uL    Hemoglobin 7.9 (L) 12.0 - 16.0 g/dL    Hematocrit 23.3 (L) 37.0 - 47.0 %    MCV 94.0 81.4 - 97.8 fL    MCH 31.9 27.0 - 33.0 pg    MCHC 33.9 33.6 - 35.0 g/dL    RDW 43.7 35.9 - 50.0 fL    Platelet Count 176 164 - 446 K/uL    MPV 9.8 9.0 - 12.9 fL   MAGNESIUM    Collection Time: 12/16/19  4:00 AM   Result Value Ref Range    Magnesium 2.0 1.5 - 2.5 mg/dL   PHOSPHORUS    Collection Time: 12/16/19  4:00 AM   Result Value Ref Range    Phosphorus 3.2 2.5 - 4.5 mg/dL   APTT    Collection Time: 12/16/19  4:00 AM   Result Value Ref Range    APTT 98.0 (H) 24.7 - 36.0 sec   ESTIMATED GFR    Collection Time: 12/16/19  4:00 AM   Result Value Ref Range    GFR If African American >60 >60 mL/min/1.73 m 2    GFR If Non  53 (A) >60 mL/min/1.73 m 2   OCCULT BLOOD X2 (STOOL)    Collection Time: 12/16/19  4:30 AM   Result Value Ref Range    Occult Blood 1 Negative Negative       Imaging:  DX-CHEST-PORTABLE (1 VIEW)   Final Result         No  acute cardiac or pulmonary abnormality is identified.      CT-CTA COMPLETE THORACOABDOMINAL AORTA   Final Result      1.  Hyperdense material within the ascending aortic wall extending from the aortic root to the distal ascending aorta consistent with intramural hematoma or direct injection of contrast media into the aortic wall from prior catheterization procedure.  No    dissection flap demonstrated.   2.  No abdominal aortic aneurysm or dissection.      These findings were discussed with EDISON ROBERTS on 12/12/2019 12:22 PM.            EC-ECHOCARDIOGRAM LTD W/O CONT   Final Result      DX-CHEST-PORTABLE (1 VIEW)   Final Result      No consolidation.      CL-LEFT HEART CATHETERIZATION WITH POSSIBLE INTERVENTION    (Results Pending)       [facility] 12/11/19    PreOp Diagnosis: Acute inferoposterior STEMI, second degree AV block type I  PostOp Diagnosis: 100% distal RCA occlusion, no sig dis LCA,   Inferior hypokinesis, severe tortuorsity Rt subclavian.innoinate artery  S/p 2.25x18 mm [place] NILSA to dist RCA into PLB and PTCA of PDA   S/p 4x23 mm bare metal stent to ostial RCA  C/b dissection ascending aorta    TTE 12/11/19  CONCLUSIONS  No prior study is available for comparison.   Technically difficult study.   Normal left ventricular chamber size.  Inferior wall hypokinesis.  Left ventricular systolic function is normal.  Left ventricular ejection fraction is visually estimated to be 55-60%.  Normal left ventricular wall thickness.  Normal pericardium without effusion.    Principal Problem:    Acute ST elevation myocardial infarction (STEMI) of inferoposterior wall (HCC) POA: Yes  Active Problems:    Ascending aortic dissection (HCC) POA: No    Atrial fibrillation with RVR ([facility]) POA: Unknown    Second degree heart block POA: Yes    Essential hypertension POA: Yes    CODI (acute kidney injury) ([facility]) POA: Unknown    Leukocytosis POA: Unknown    Anemia POA: Unknown  Resolved Problems:    * No resolved hospital problems.  *      Assessment / Plan:    79 year old woman with PMH HTN presents with chest pain and found inf STEMI s/p partial revascularization c/b aortic dissection, acute blood loss unclear source, paroxysmal atrial fibrillation with RVR now sinus, and second degree type I AVB now resolved.    -hgb stabilized, trial eliquis for AF cva ppx  -cont plavix, high intensity statin   -cont amiodarone load for 10g then convert to 200mg daily  -will consider CTA aorta chest/abd/pelv prior to d/c to ensure dissection stable    I personally discussed her case with  Dr Murali Teran M.D.    It is my pleasure to participate in the care of Ms. Otero.  Please do not hesitate to contact me with questions or concerns.    Tony Wharton MD  Cardiologist [facility]

## 2019-12-17 ENCOUNTER — APPOINTMENT (OUTPATIENT)
Dept: RADIOLOGY | Facility: MEDICAL CENTER | Age: 79
DRG: 246 | End: 2019-12-17
Attending: NURSE PRACTITIONER
Payer: MEDICARE

## 2019-12-17 VITALS
OXYGEN SATURATION: 98 % | HEART RATE: 140 BPM | HEIGHT: 62 IN | TEMPERATURE: 98 F | BODY MASS INDEX: 29.86 KG/M2 | SYSTOLIC BLOOD PRESSURE: 117 MMHG | DIASTOLIC BLOOD PRESSURE: 73 MMHG | RESPIRATION RATE: 16 BRPM | WEIGHT: 162.26 LBS

## 2019-12-17 DIAGNOSIS — I25.10 CORONARY ARTERY DISEASE INVOLVING NATIVE CORONARY ARTERY OF NATIVE HEART WITHOUT ANGINA PECTORIS: ICD-10-CM

## 2019-12-17 DIAGNOSIS — I48.0 PAF (PAROXYSMAL ATRIAL FIBRILLATION) (HCC): ICD-10-CM

## 2019-12-17 DIAGNOSIS — Z79.01 CHRONIC ANTICOAGULATION: ICD-10-CM

## 2019-12-17 LAB
ANION GAP SERPL CALC-SCNC: 11 MMOL/L (ref 0–11.9)
BUN SERPL-MCNC: 13 MG/DL (ref 8–22)
CALCIUM SERPL-MCNC: 8.7 MG/DL (ref 8.5–10.5)
CHLORIDE SERPL-SCNC: 109 MMOL/L (ref 96–112)
CO2 SERPL-SCNC: 20 MMOL/L (ref 20–33)
CREAT SERPL-MCNC: 1 MG/DL (ref 0.5–1.4)
ERYTHROCYTE [DISTWIDTH] IN BLOOD BY AUTOMATED COUNT: 43.8 FL (ref 35.9–50)
GLUCOSE SERPL-MCNC: 101 MG/DL (ref 65–99)
HCT VFR BLD AUTO: 27.7 % (ref 37–47)
HGB BLD-MCNC: 8.9 G/DL (ref 12–16)
MAGNESIUM SERPL-MCNC: 2.1 MG/DL (ref 1.5–2.5)
MCH RBC QN AUTO: 30.4 PG (ref 27–33)
MCHC RBC AUTO-ENTMCNC: 32.1 G/DL (ref 33.6–35)
MCV RBC AUTO: 94.5 FL (ref 81.4–97.8)
PHOSPHATE SERPL-MCNC: 3.8 MG/DL (ref 2.5–4.5)
PLATELET # BLD AUTO: 230 K/UL (ref 164–446)
PMV BLD AUTO: 9.4 FL (ref 9–12.9)
POTASSIUM SERPL-SCNC: 4.1 MMOL/L (ref 3.6–5.5)
RBC # BLD AUTO: 2.93 M/UL (ref 4.2–5.4)
SODIUM SERPL-SCNC: 140 MMOL/L (ref 135–145)
TROPONIN T SERPL-MCNC: 2761 NG/L (ref 6–19)
WBC # BLD AUTO: 10.5 K/UL (ref 4.8–10.8)

## 2019-12-17 PROCEDURE — 83735 ASSAY OF MAGNESIUM: CPT

## 2019-12-17 PROCEDURE — 700102 HCHG RX REV CODE 250 W/ 637 OVERRIDE(OP): Performed by: NURSE PRACTITIONER

## 2019-12-17 PROCEDURE — A9270 NON-COVERED ITEM OR SERVICE: HCPCS | Performed by: NURSE PRACTITIONER

## 2019-12-17 PROCEDURE — 99239 HOSP IP/OBS DSCHRG MGMT >30: CPT | Performed by: HOSPITALIST

## 2019-12-17 PROCEDURE — 700102 HCHG RX REV CODE 250 W/ 637 OVERRIDE(OP): Performed by: INTERNAL MEDICINE

## 2019-12-17 PROCEDURE — 36415 COLL VENOUS BLD VENIPUNCTURE: CPT

## 2019-12-17 PROCEDURE — A9270 NON-COVERED ITEM OR SERVICE: HCPCS | Performed by: INTERNAL MEDICINE

## 2019-12-17 PROCEDURE — 84100 ASSAY OF PHOSPHORUS: CPT

## 2019-12-17 PROCEDURE — A9270 NON-COVERED ITEM OR SERVICE: HCPCS | Performed by: HOSPITALIST

## 2019-12-17 PROCEDURE — 80048 BASIC METABOLIC PNL TOTAL CA: CPT

## 2019-12-17 PROCEDURE — 700102 HCHG RX REV CODE 250 W/ 637 OVERRIDE(OP): Performed by: HOSPITALIST

## 2019-12-17 PROCEDURE — 99232 SBSQ HOSP IP/OBS MODERATE 35: CPT | Performed by: INTERNAL MEDICINE

## 2019-12-17 PROCEDURE — 700117 HCHG RX CONTRAST REV CODE 255: Performed by: NURSE PRACTITIONER

## 2019-12-17 PROCEDURE — 84484 ASSAY OF TROPONIN QUANT: CPT

## 2019-12-17 PROCEDURE — 71275 CT ANGIOGRAPHY CHEST: CPT

## 2019-12-17 PROCEDURE — 85027 COMPLETE CBC AUTOMATED: CPT

## 2019-12-17 RX ORDER — METOPROLOL SUCCINATE 25 MG/1
25 TABLET, EXTENDED RELEASE ORAL
Status: DISCONTINUED | OUTPATIENT
Start: 2019-12-17 | End: 2019-12-17 | Stop reason: HOSPADM

## 2019-12-17 RX ORDER — ATORVASTATIN CALCIUM 40 MG/1
40 TABLET, FILM COATED ORAL EVERY EVENING
Qty: 30 TAB | Refills: 11 | Status: SHIPPED | OUTPATIENT
Start: 2019-12-17 | End: 2019-12-27 | Stop reason: SDUPTHER

## 2019-12-17 RX ORDER — ASPIRIN 81 MG/1
81 TABLET, CHEWABLE ORAL DAILY
Status: DISCONTINUED | OUTPATIENT
Start: 2019-12-17 | End: 2019-12-17 | Stop reason: HOSPADM

## 2019-12-17 RX ORDER — AMIODARONE HYDROCHLORIDE 400 MG/1
400 TABLET ORAL DAILY
Qty: 30 TAB | Refills: 1 | Status: SHIPPED | OUTPATIENT
Start: 2019-12-17 | End: 2019-12-27

## 2019-12-17 RX ORDER — ASPIRIN 81 MG/1
81 TABLET, CHEWABLE ORAL DAILY
Qty: 100 TAB | Refills: 3 | Status: SHIPPED | OUTPATIENT
Start: 2019-12-18 | End: 2020-01-20

## 2019-12-17 RX ADMIN — IOHEXOL 100 ML: 350 INJECTION, SOLUTION INTRAVENOUS at 11:26

## 2019-12-17 RX ADMIN — POTASSIUM CHLORIDE 20 MEQ: 1500 TABLET, EXTENDED RELEASE ORAL at 06:07

## 2019-12-17 RX ADMIN — AMIODARONE HYDROCHLORIDE 400 MG: 200 TABLET ORAL at 06:07

## 2019-12-17 RX ADMIN — CLOPIDOGREL BISULFATE 75 MG: 75 TABLET ORAL at 06:07

## 2019-12-17 RX ADMIN — METOPROLOL SUCCINATE 25 MG: 25 TABLET, EXTENDED RELEASE ORAL at 13:22

## 2019-12-17 RX ADMIN — ASPIRIN 81 MG 81 MG: 81 TABLET ORAL at 13:22

## 2019-12-17 RX ADMIN — APIXABAN 5 MG: 5 TABLET, FILM COATED ORAL at 06:07

## 2019-12-17 ASSESSMENT — ENCOUNTER SYMPTOMS
FATIGUE: 1
FEVER: 0
SHORTNESS OF BREATH: 0
BLOOD IN STOOL: 0
CHEST TIGHTNESS: 0
PALPITATIONS: 0
ABDOMINAL PAIN: 0
DIZZINESS: 0

## 2019-12-17 NOTE — DISCHARGE SUMMARY
Discharge Summary    CHIEF COMPLAINT ON ADMISSION  Chief Complaint   Patient presents with   • Chest Pain       Reason for Admission  Cp     Admission Date  12/11/2019    CODE STATUS  Full Code    HPI & HOSPITAL COURSE  This is a 79 y.o. female here with chest pain.   Ms. Otero a history of hypertension that presented to the emergency room with 30 minutes of squeezing chest pain and was found to have ST elevation inferior leads thus went emergently to the cardiac Cath Lab where she had bare metal stenting of the ostium and a drug eluding stent to the right coronary artery.  She stained a dissection of the aortic root and ascending aorta and was admitted to the intensive care unit in guarded condition.  Was confirmed on CTA of the chest.  Cardiothoracic surgery evaluated the images and felt that she was not a candidate for intervention and she should be medically managed.  Ms. Otero developed atrial fibrillation thus was started on full anticoagulation therapy with monotherapy antiplatelet.  She has been loaded with amiodarone. An echocardiogram revealed a normal ejection fraction of 55 to 60%.  There was no evidence of pericardial effusion.       Today she has gone in and out of afib with rates up to 150 while on Toprol 25 mg thus she will be placed back on her home dose of atenolol 50 mg which she was on prior to admit. She has been on amiodarone 400 mg BID which will be dropped to daily upon discharge and follow up with cardiology to determine if she can be tapered off or tapered down on the amiodarone dose depending on her rhythm and rate.     Her son is at bedside and we had an extensive discussion about her risk of bleeding while on dual antiplatelet meds and a blood thinner. She has not historically fallen though is at risk with the paroxysmal afib.     A repeat CTA today shows a smaller clot in the aorta despite Eliquis. Dr. Wharton has recommended Eliquis, aspirin, and Plavix.    Therefore, she is  discharged in good and stable condition to home with close outpatient follow-up.    The patient met 2-midnight criteria for an inpatient stay at the time of discharge.    Discharge Date  12/17/19    FOLLOW UP ITEMS POST DISCHARGE  Cardiology     DISCHARGE DIAGNOSES  Principal Problem:    Acute ST elevation myocardial infarction (STEMI) of inferoposterior wall (HCC) POA: Yes  Active Problems:    Ascending aortic dissection (HCC) POA: No    Atrial fibrillation with RVR (HCC) POA: Unknown    Second degree heart block POA: Yes    Essential hypertension POA: Yes    CODI (acute kidney injury) ([facility]) POA: Unknown    Leukocytosis POA: Unknown    Anemia POA: Unknown  Resolved Problems:    * No resolved hospital problems. *      FOLLOW UP  Future Appointments   Date Time Provider Department Center   12/27/2019  3:30 PM KENISHA Matos [facility] None     No follow-up provider specified.    MEDICATIONS ON DISCHARGE     Medication List      START taking these medications      Instructions   amiodarone 400 MG tablet  Commonly known as:  PACERONE   Take 1 Tab by mouth every day.  Dose:  400 mg     apixaban 5mg Tabs  Commonly known as:  ELIQUIS   Take 1 Tab by mouth 2 Times a Day.  Dose:  5 mg     aspirin 81 MG Chew chewable tablet  Start taking on:  December 18, 2019  Commonly known as:  ASA   Take 1 Tab by mouth every day.  Dose:  81 mg     atorvastatin 40 MG Tabs  Commonly known as:  LIPITOR   Take 1 Tab by mouth every evening.  Dose:  40 mg     clopidogrel 75 MG Tabs  Commonly known as:  PLAVIX   Doctor's comments:  meds to beds  Take 1 Tab by mouth every day.  Dose:  75 mg        CONTINUE taking these medications      Instructions   atenolol 50 MG Tabs  Commonly known as:  TENORMIN   Take 50 mg by mouth every day.  Dose:  50 mg            Allergies  Allergies   Allergen Reactions   • Sulfa Drugs Vomiting       DIET  Orders Placed This Encounter   Procedures   • Diet Order Cardiac     Standing Status:   Standing      Number of Occurrences:   1     Order Specific Question:   Diet:     Answer:   Cardiac [6]       ACTIVITY  Fall precautions discussed.     CONSULTATIONS  Cardiology     PROCEDURES  Heart cath 12/11/19 by Dr. Armstrong      Post-operative Diagnosis:   1.  Acute inferior ST elevation microinfarction secondary to total occlusion of distal right coronary artery  2.  Second-degree AV block Mobitz type I  3.  Hypokinesis of the basal to mid inferior wall with preserved overall LV systolic function  4.  Status post stenting of the ostium and distal right coronary artery with 4 x 23 mm bare-metal stent and 2.25 x 18 mm Bulmaro drug eluting stent respectively     Procedure:  1.  Emergent left heart catheterization with left ventriculography  2.  Selective coronary angiography  3.  Placement of transvenous temporary pacemaker   4.  Emergent percutaneous core intervention of the right coronary artery  5.  Supervision of moderate conscious sedation    LABORATORY  Lab Results   Component Value Date    SODIUM 140 12/17/2019    POTASSIUM 4.1 12/17/2019    CHLORIDE 109 12/17/2019    CO2 20 12/17/2019    GLUCOSE 101 (H) 12/17/2019    BUN 13 12/17/2019    CREATININE 1.00 12/17/2019        Lab Results   Component Value Date    WBC 10.5 12/17/2019    HEMOGLOBIN 8.9 (L) 12/17/2019    HEMATOCRIT 27.7 (L) 12/17/2019    PLATELETCT 230 12/17/2019    echocardiogram:  CONCLUSIONS  No prior study is available for comparison.   Technically difficult study.   Normal left ventricular chamber size.  Inferior wall hypokinesis.  Left ventricular systolic function is normal.  Left ventricular ejection fraction is visually estimated to be 55-60%.  Normal left ventricular wall thickness.  Normal pericardium without effusion.    Imaging studies:  CT-CTA COMPLETE THORACOABDOMINAL AORTA   Final Result      1.  Intramural hematoma of the ascending aorta has decreased in size and density. It extends approximately MCC through the ascending aorta. No  dissection flap. No aortic aneurysm.   2.  No acute abnormality in the chest, abdomen or pelvis.   3.  Stable 2.2 cm left thyroid nodule can be further evaluated with outpatient ultrasound.   4.  Colonic diverticulosis.            DX-CHEST-PORTABLE (1 VIEW)   Final Result         No acute cardiac or pulmonary abnormality is identified.      CT-CTA COMPLETE THORACOABDOMINAL AORTA   Final Result      1.  Hyperdense material within the ascending aortic wall extending from the aortic root to the distal ascending aorta consistent with intramural hematoma or direct injection of contrast media into the aortic wall from prior catheterization procedure.  No    dissection flap demonstrated.   2.  No abdominal aortic aneurysm or dissection.      These findings were discussed with EDISON ROBERTS on 12/12/2019 12:22 PM.            EC-ECHOCARDIOGRAM LTD W/O CONT   Final Result      DX-CHEST-PORTABLE (1 VIEW)   Final Result      No consolidation.      CL-LEFT HEART CATHETERIZATION WITH POSSIBLE INTERVENTION    (Results Pending)            Total time of the discharge process exceeds 32  minutes.

## 2019-12-17 NOTE — CARE PLAN
Problem: Communication  Goal: The ability to communicate needs accurately and effectively will improve  Outcome: PROGRESSING AS EXPECTED     Problem: Safety  Goal: Will remain free from injury  Outcome: PROGRESSING AS EXPECTED  Goal: Will remain free from falls  Outcome: PROGRESSING AS EXPECTED     Problem: Venous Thromboembolism (VTW)/Deep Vein Thrombosis (DVT) Prevention:  Goal: Patient will participate in Venous Thrombosis (VTE)/Deep Vein Thrombosis (DVT)Prevention Measures  Outcome: PROGRESSING AS EXPECTED     Problem: Knowledge Deficit  Goal: Knowledge of disease process/condition, treatment plan, diagnostic tests, and medications will improve  Outcome: PROGRESSING AS EXPECTED     Problem: Discharge Barriers/Planning  Goal: Patient's continuum of care needs will be met  Outcome: PROGRESSING AS EXPECTED

## 2019-12-17 NOTE — PROGRESS NOTES
12 hour chart check   [facility] Urology Consult:  Consult from: Yaya CASTILLO  Consult for: BPH    Mr. Patrick Wilson is a 54-year-old male referred by ANNA Nick/ Dr Chacon for evaluation of BPH with LUTS    He was last seen by primary care on 05/03/2018 and noted to be on Flomax 0.4 mg daily and finasteride 5 mg daily without adequate symptomatic control on dual medical therapy.  Noted to have worsening nocturia 4-5 times.  He requested new urologist.  He was previously seeing Dr. Swanson.  He was noted to be a chronically ill individual, as he has chronic pain syndrome and had been doing better lately and was noted to be the best he had been seen in quite some time.  Takes chronic hydrocodone, Xanax, Cymbalta.  Did have lumbar spine surgery in 1995, and 2005 by anterior approach.  He also has a history of hypogonadism and testosterone replacement.  This has been complicated by polycythemia and on 12/19/2017 H/H were noted to be 58.6/19.1.  He was advised to go to the blood Center at that time and donate 1 unit of blood and reduce his testosterone dose to 1 cc every 2 weeks instead of 2 cc.  A repeat H/H on 04/17/2018 was noted to be 52.1/17.  Total testosterone 241 on 02/28/2018.  Unclear about timing of last injection.  Recently had LIH pending repair with Dr Lux May 2018.    Record review from Dr. Swanson indicates that as of March 8, 2017 his PSA was 0.8, and had a documented testosterone of 406 (10/3/16).  He was noted to be on testosterone injection therapy at that time, and again timing of injections to labs unclear.  He was noted at that visit to have a 40 g prostate with no nodules.  He took Cialis 20 mg dose with significant improvement in his ED.  This was prescribed.  February 2016:  BPH on Flomax, PSA 0.8 January 2016.  Free testosterone low at 19.  On testosterone 1.5 cc every 2 weeks.  September 2015:  Poor results with monthly testosterone injection, changed to 1.5 cc every 2 weeks.  BPH, still on Flomax,  "60% improvement.  ED:  Still with decreased libido, uses 100 mg Viagra.  May 2015:  Testosterone injection teaching.  2 cc IM given.  Trial of Rapaflo for BPH with 50-70% improvement.  Prescribed Flomax.  Given a trial of Cialis for ED.    PSA:  04/10/2015 1.0, 01/06/2016 0.8 (25% free), 4/11/16 0.9, 3/6/17 0.8.  Creatinine 0.98. H/H 17/54.  TSH 1.44 normal  Total testosterone:  162 04/10/2015, 512 06/11/15, 09/22/2015 114 (19 free), 172 04/11/2016, 332 06/22/2016, 406 10/03/2016  Scrotal ultrasound 05/20/2015 with 4.4 cm right epididymal head cystic mass:  Epididymal cyst versus spermatocele, less likely loculated hydrocele.  4.4 x 2.6 x 4.2 cm with increased through transmission of sound.    He reports today noting that at 50 got very depressed and tired all the time and his T went down to 156.  Reports no point in taking his testosterone as wasn't helping so hasnt taken it in one year  Having urgency with occ drop UUI. Urgency x 3 years.  AUA SS: 26/6 (5: frequency, urgency; 4: emptying, nocturia; 3: intermittency, weak stream; 2: straining) - meds helped only 1 of 10.  Urgent with all nighttime voids, with nocturia 3-4x, is sometimes drinking up until bedtime  Water throughout day, 2 beers per day and a highball or mimosa in the evening. Doesn't get "loaded, just relaxed"  Minimal tea and soda. ++ spicy foods. ++ alcohol a few drinks per day  2 cups of coffee per AM  Night constant stream, during day has intermittency.  With bowel movement will urinate before, then after BM will need to urinate again with dribbling.  Had easy brusing and stomach ulcers from daypro which he has stopped since endoscopy/colonoscopy.  Urinated right before leaving the house. Couldn't void on arrival with PVR 147cc.  Feeling of being able to urinate comes and goes.   Can drink 4-6 beers and not need to urinate, but once goes, goes every 5 minutes  No hematuria, dysuria    Past Medical History:   Diagnosis Date    Anticoagulant " long-term use     Anxiety     Arthritis     Cerebral palsy     Depression     GERD (gastroesophageal reflux disease)     Hypertension        Past Surgical History:   Procedure Laterality Date    BACK SURGERY      LEG SURGERY Left     SHOULDER SURGERY Right        History reviewed. No pertinent family history.    Social History     Social History    Marital status:      Spouse name: N/A    Number of children: N/A    Years of education: N/A     Occupational History    Not on file.     Social History Main Topics    Smoking status: Current Some Day Smoker     Types: Cigars, Pipe    Smokeless tobacco: Never Used      Comment: occasional.    Alcohol use Yes      Comment: occasional    Drug use: No    Sexual activity: Not on file     Other Topics Concern    Not on file     Social History Narrative    No narrative on file       Review of patient's allergies indicates:   Allergen Reactions    Tylox [oxycodone-acetaminophen] Nausea And Vomiting       Medications Reviewed: see MAR    ROS:    Constitutional: denies fevers, chills, night sweats, fatigue, malaise  Respiratory: negative for cough, shortness of breath, wheezing, dyspnea.  Cardiovascular: + for high blood pressure, negative for chest pain, varicose veins, ankle swelling, palpitations, syncope.  GI: negative for abdominal pain, heartburn, indigestion, nausea, vomiting, constipation, diarrhea, blood in stool.   Urology: as noted above in HPI  Endocrinology: negative for cold intolerance, excessive thirst, not feeling tired/sluggish, no heat intolerance.   Hematology/Lymph: negative for easy bleeding, easy bruising, swollen glands.  Musculoskeletal: negative for back pain, joint pain, joint swelling, neck pain.  Allergy-Immunology: negative for seasonal allergies, negative for unusual infections.   Skin: negative for boils, breast lumps, hives, itching, rash.   Neurology: negative for, dizziness, headache, tingling/numbness, tremors.  "  Psych: satisfied with life; negative for, anxiety, depression, suicidal thoughts.     PHYSICAL EXAM:    Vitals:    06/25/18 0858   BP: (!) 154/97   Pulse: 103   Resp: 18     Body mass index is 34.3 kg/m². Weight: 99.3 kg (219 lb) Height: 5' 7" (170.2 cm)       General: Alert, cooperative, no distress, appears stated age  Head: Normocephalic, without obvious abnormality, atraumatic  Neck: no masses, no thyromegaly, no lymphadenopathy  Eyes: PERRL, conjunctiva/corneas clear  Lungs: Respirations unlabored, normal effort, no accessory muscle use  CV: Warm and well perfused extremities  Abdomen: Soft, non-tender, no CVA tenderness, no hepatosplenomegaly, 1+ pannus, LLQ scar, + LIH reducible  Back: surgical scar midline lumbar with central dimple within surgical scar  Penis: phallus normal, circumcised, well cared for, no plaques or lesions.   Scrotum: no cysts, no lesions, no rash, no hydrocele.   Epididymes: normal, nontender, symmetrical, large R epdidymal cyst  Testes: normal, both descended, no masses.   Urethra: palpably normal with orthotopic meatus of normal size    AMBER: normal sphincter tone, no masses, no hemmorrhoids   PROSTATE: 30g, no nodules, non-tender, symmetrical.   Extremities: Extremities normal, atraumatic, no cyanosis or edema  Skin: Normal color, texture, and turgor, no rashes or lesions  Psych: Appropriate, well oriented, normal affect, normal mood  Neuro: Non-focal      Assessment/Diagnosis:    1. BPH with obstruction/lower urinary tract symptoms  Case Request Operating Room: CYSTOSCOPY, ULTRASOUND, TRANSRECTAL    Place in Outpatient   2. Urinary urgency     3. History of hypogonadism         Plans:  Discussed conservative measures to control urgency and frequency including avoiding bladder irritants, timed voiding, not postponing voiding, and bowel regimen (as distended bowel has extrinsic compressive effect on bladder. Discussed bladder irritants include coffe (even decaf), tea, alcohol, soda, " spicy foods, acidic juices (orange, tomato), vinegar, and artificial sweeteners.  Since he has mildly enlarged prostate with significant obstructive symptoms, in addition to conservative measures as above urgency, we also discussed discontinuing fluid intake 2 hr before bed to help control nocturia.  As his symptoms are persistent despite alpha blockers, we did discuss formal lower urinary tract evaluation for obstruction to help guide further recommendations.  We discussed cystourethroscopy to evaluate for prostatic obstruction or any other lower urinary tract obstruction, with concurrent transrectal ultrasound guided accurate volumetric measurement of the prostate.  He would be interested in minimally invasive BPH therapies to help discontinue medication, though in the interim until evaluation he should continue Flomax at 0.8 mg dose.  Cystoscopy and trus 7/17/18   May need anticholinergic vs uds if no sig obstruction present  All questions patient had were answered.  He was agreeable to treatment plan.

## 2019-12-17 NOTE — DISCHARGE PLANNING
MEDS TO BEDS     DELIVERED TO PATIENT IN ROOM    Clopidogrel 75mg #30 provided at $7.50 copay.     Pt education provided, all questions answered, including but not limited to potential side effects, what to do if a dose is missed, when to contact his physician.  Pt verbalized understanding to take this medication once daily, with or without regard to food,  for a duration of 12 months unless otherwise directed by cardiology.  Pt understands to begin this medication AFTER discharge from hospital. Pt verbalized understanding that he must refill this medication at his local drug outlet.     All questions answered.  Pt verbalized understanding including when to return to the ED.  Emanuel Coreas, PharmD

## 2019-12-17 NOTE — PROGRESS NOTES
Pt stable this morning. Cardiology team in to see pt. Plan for CT to visualize aorta to confirm it is stable and if so plan is to DC.

## 2019-12-17 NOTE — PROGRESS NOTES
Cardiology Follow Up Progress Note    Date of Service  12/17/2019    Attending Physician  Nehemiah Keith M.D.    Chief Complaint   Chest pain    Cardiology consulted for acute STEMI.     STEPHEN Otero is a 79 y.o. female admitted 12/11/2019 with acute inferoposterior STEMI. Patient underwent emergent partial revascularization due to procedural aortic dissection. On arrival to the cath lab patient was noted to be in second degree AV block and a temporary PPM was placed. Patient also had atrial fibrillation with RVR on the monitor and anemia post procedure.     Past medical history significant for hypertension.     Interim Events  12/17/19: NSR on tele overnight with frequent PVC's. Patient is feeling well, states that she has no significant complaints this am. Anxious to go home.     Review of Systems  Review of Systems   Constitutional: Positive for fatigue. Negative for fever.   Respiratory: Negative for chest tightness and shortness of breath.    Cardiovascular: Negative for chest pain, palpitations and leg swelling.   Gastrointestinal: Negative for abdominal pain and blood in stool.   Genitourinary: Negative for hematuria.   Musculoskeletal: Negative for gait problem.   Skin:        Bilateral wrist bruising   Neurological: Negative for dizziness and syncope.   All other systems reviewed and are negative.      Vital signs in last 24 hours  Temp:  [36.1 °C (97 °F)-37 °C (98.6 °F)] 36.6 °C (97.9 °F)  Pulse:  [] 79  Resp:  [16-18] 18  BP: ()/(55-72) 99/55  SpO2:  [95 %-100 %] 95 %    Physical Exam  Physical Exam  Constitutional:       General: She is not in acute distress.     Appearance: Normal appearance.   HENT:      Head: Normocephalic.   Neck:      Musculoskeletal: Normal range of motion.   Cardiovascular:      Rate and Rhythm: Normal rate and regular rhythm.      Pulses: Normal pulses.      Heart sounds: Normal heart sounds.   Pulmonary:      Effort: Pulmonary effort is normal.      Breath  sounds: Normal breath sounds.   Abdominal:      Palpations: Abdomen is soft.      Tenderness: There is no tenderness.   Musculoskeletal:      Right lower leg: No edema.      Left lower leg: No edema.   Skin:     General: Skin is warm and dry.      Comments: Bilateral wrist ecchymosis without hematoma   Neurological:      Mental Status: She is alert and oriented to person, place, and time.   Psychiatric:         Mood and Affect: Mood normal.         Behavior: Behavior normal.         Thought Content: Thought content normal.         Lab Review  Lab Results   Component Value Date/Time    WBC 10.5 12/17/2019 02:37 AM    RBC 2.93 (L) 12/17/2019 02:37 AM    HEMOGLOBIN 8.9 (L) 12/17/2019 02:37 AM    HEMATOCRIT 27.7 (L) 12/17/2019 02:37 AM    MCV 94.5 12/17/2019 02:37 AM    MCH 30.4 12/17/2019 02:37 AM    MCHC 32.1 (L) 12/17/2019 02:37 AM    MPV 9.4 12/17/2019 02:37 AM      Lab Results   Component Value Date/Time    SODIUM 140 12/17/2019 02:37 AM    POTASSIUM 4.1 12/17/2019 02:37 AM    CHLORIDE 109 12/17/2019 02:37 AM    CO2 20 12/17/2019 02:37 AM    GLUCOSE 101 (H) 12/17/2019 02:37 AM    BUN 13 12/17/2019 02:37 AM    CREATININE 1.00 12/17/2019 02:37 AM      Lab Results   Component Value Date/Time    ASTSGOT 41 12/15/2019 04:30 AM    ALTSGPT 19 12/15/2019 04:30 AM     Lab Results   Component Value Date/Time    CHOLSTRLTOT 120 12/13/2019 04:21 PM    LDL 59 12/13/2019 04:21 PM    HDL 48 12/13/2019 04:21 PM    TRIGLYCERIDE 66 12/13/2019 04:21 PM    TROPONINT 2761 (H) 12/17/2019 02:37 AM       No results for input(s): NTPROBNP in the last 72 hours.    Cardiac Imaging and Procedures Review  Echocardiogram 12/11/19:  CONCLUSIONS  No prior study is available for comparison.   Technically difficult study.   Normal left ventricular chamber size.  Inferior wall hypokinesis.  Left ventricular systolic function is normal.  Left ventricular ejection fraction is visually estimated to be 55-60%.  Normal left ventricular wall  thickness.  Normal pericardium without effusion.     Left Ventricle  Normal left ventricular chamber size. Normal left ventricular wall thickness. Left ventricular systolic function is normal. Left ventricular ejection fraction is visually estimated to be 55-60%. Inferior wall hypokinesis.     Cardiac Catheterization 12/11/19:  There is no gradient across aortic valve.  Left ventriculography showed hypokinesis/akinesis of the inferior wall.  Overall LV systolic function is normal .  Ejection fraction is calculated to be 60 %.     Coronary artery disease     This is right dominant system.     Left main is large and without flow limiting disease.  It bifurcated into left anterior descending and left circumflex artery.      Left anterior descending artery is large caliber vessel.  It gives rises to several small  diagonal branches. Limited images was obtained but there is no signficant disease in the left anterior descending artery or its major branches seen. The antegrade flow is normal.     Left circumflex artery is large in caliber. Limited image of the LCX was also obtained. It gives rise to several obtuse marginal branches.  There is no significant disease in the LCX system.The antegrade flow is normal.     Right coronary artery (RCA) is large caliber.   At debating beginning the case, the RCA was occluded distally at the crux after giving rise to several small acute marginal branches. There was also large amount of thrombus causing subtotal occlusion at the ostium of the posterior descending artery and the posterolateral branch was not visualized.    Pre-operative Diagnosis:  Acute inferior ST elevation myocardial infarction     Post-operative Diagnosis:   1.  Acute inferior ST elevation microinfarction secondary to total occlusion of distal right coronary artery  2.  Second-degree AV block Mobitz type I  3.  Hypokinesis of the basal to mid inferior wall with preserved overall LV systolic function  4.  Status  post stenting of the ostium and distal right coronary artery with 4 x 23 mm bare-metal stent and 2.25 x 18 mm [place] drug eluting stent respectively       Assessment/Plan    Acute Inferoposterior STEMI:  -S/P partial revascularization of the RCA (NILSA to distal RCA, BMS to ostial RCA and PTCA of PDA) on 12/11/19 complicated by contained ascending aortic dissection.   -Continue Plavix 75 mg daily only due to chronic OAC and anemia and Atorvastatin 40 mg daily.  -No BB due to recent bradycardia/HB and no ACE/ARB due to borderline BP.     Ascending Aortic Dissection:  -CTS consulted and recommended observation.   -Repeat CTA aorta/chest/pelvis ordered to be completed prior to discharge to ensure dissection is stable per Dr. Wharton.     PAF:  -Maintaining NSR on tele.   -Continue 10g Amiodarone loading 400 mg BID (started on 12/15/19) then reduce to 200 mg daily thereafter.   -Trialing Eliquis 5 mg BID, monitor H/H closely.   -BMP in one week, lab ordered and slip given to patient.     Mobitz 1 Heart Block:  -Resolved.   -Temporary PPM removed 12/12/19.    Anemia:  -Hgb has stabilized.   -CBC in one week, ordered and lab slip given to patient.     Thank you for allowing us to participate in the care of this patient. Patient is stable from cardiology perspective for discharge with close cardiology follow up as below. We will sign off. Please let us know if you have any questions or concerns.     Future Appointments   Date Time Provider Department Center   12/27/2019  3:30 PM NARGIS Matos. [facility] None       NARGIS Matos.   [facility]  [phone]

## 2019-12-17 NOTE — PROGRESS NOTES
2 RN skin check complete.   Devices in place NA.  Skin assessed under devices NA.  Confirmed pressure ulcers found on NA.  New potential pressure ulcers noted on NA. Wound consult placed NA.  Arms bruised and swollen.

## 2019-12-17 NOTE — PROGRESS NOTES
Pt dc'd at 1525. IV and monitor removed; monitor room notified. Pt left unit via wheelchair with son and CNA. Personal belongings with pt when leaving unit. Pt given discharge instructions prior to leaving unit including prescription and when to visit with physician; verbalizes understanding. Copy of discharge instructions with pt and in the chart.

## 2019-12-17 NOTE — CARE PLAN
Problem: Skin Integrity  Goal: Risk for impaired skin integrity will decrease  Note:   Skin is fragile and bruised, moisturizer supplied     Problem: Self Care Deficit  Goal: Provide hygiene and grooming for the dependent patient  Note:   Patient able to perform ADLs independently. Showered today independently, only assistance with setting up.

## 2019-12-17 NOTE — PROGRESS NOTES
Updated Dr. Montes De Oca at 1700 regarding patient's increased troponin, 2928 from 2798. Patient continues to be asymptomatic, denies chest pain, vital signs stable, no SOB, N/V, or dizziness. Orders to recheck troponin in AM.

## 2019-12-17 NOTE — DISCHARGE INSTRUCTIONS
Discharge Instructions    Discharged to home by car with relative. Discharged via wheelchair, hospital escort: Yes.  Special equipment needed: Not Applicable    Be sure to schedule a follow-up appointment with your primary care doctor or any specialists as instructed.     Discharge Plan:   Influenza Vaccine Indication: Indicated: 65 years and older  Influenza Vaccine Given - only chart on this line when given: Influenza Vaccine Given (See MAR)    I understand that a diet low in cholesterol, fat, and sodium is recommended for good health. Unless I have been given specific instructions below for another diet, I accept this instruction as my diet prescription.   Other diet: Cardiac    Special Instructions: Diagnosis:  Acute Coronary Syndrome (ACS) is a diagnosis that encompasses cardiac-related chest pain and heart attack. ACS occurs when the blood flow to the heart muscle is severely reduced or cut off completely due to a slow process called atherosclerosis.  Atherosclerosis is a disease in which the coronary arteries become narrow from a buildup of fat, cholesterol, and other substances that combine to form plaque. If the plaque breaks, a blood clot will form and block the blood flow to the heart muscle. This lack of blood flow can cause damage or death to the heart muscle which is called a heart attack or Myocardial Infarction (MI). There are two different types of MIs:  ST Elevation Myocardial Infarction or STEMI (the most severe type of heart attack) and Non-ST Elevation Myocardial Infarction or NSTEMI.    Treatment Plan:  · Cardiac Diet  - Low fat, low salt, low cholesterol   · Cardiac Rehab  - Your doctor has ordered you a referral to [facility].  Call [phone] to schedule an appointment.  · Attend my follow-up appointment with my Cardiologist.  · Take my medications as prescribed by my doctor  · Exercise daily  · Lower my bad cholesterol and raise my good cholesterol, lower my blood pressure and Reduce  stress    Medications:  Certain medications are used to treat ACS.  Remember to always take medications as prescribed and never stop talking medications unless told by your doctor.    You have been prescribed the following medicatons:    Aspirin - Aspirin is used as a blood thinning medication and you will require this medication indefinitely.  Anti-platelet/blood thinner - Your Anti-platelet/Blood thinning medication is called Plavix, and is used in combination with aspirin to prevent clots from forming in your heart and/or around your stent.  Your doctor will determine how long you need to be on this medicine.  Beta-Blocker - Beta-Blocker atenolol is used to lower blood pressure and heart rate, and/or helps your heart heal after a heart attack.  Statin - Statin atorvastatin is used to lower cholesterol.    · Is patient discharged on Warfarin / Coumadin?   No     Depression / Suicide Risk    As you are discharged from this [facility] facility, it is important to learn how to keep safe from harming yourself.    Recognize the warning signs:  · Abrupt changes in personality, positive or negative- including increase in energy   · Giving away possessions  · Change in eating patterns- significant weight changes-  positive or negative  · Change in sleeping patterns- unable to sleep or sleeping all the time   · Unwillingness or inability to communicate  · Depression  · Unusual sadness, discouragement and loneliness  · Talk of wanting to die  · Neglect of personal appearance   · Rebelliousness- reckless behavior  · Withdrawal from people/activities they love  · Confusion- inability to concentrate     If you or a loved one observes any of these behaviors or has concerns about self-harm, here's what you can do:  · Talk about it- your feelings and reasons for harming yourself  · Remove any means that you might use to hurt yourself (examples: pills, rope, extension cords, firearm)  · Get professional help from the community  (Mental Health, Substance Abuse, psychological counseling)  · Do not be alone:Call your Safe Contact- someone whom you trust who will be there for you.  · Call your local CRISIS HOTLINE 985-6036 or 468-586-7598  · Call your local Children's Mobile Crisis Response Team [place] (957) 518-3330 or www.TRUE linkswear  · Call the toll free National Suicide Prevention Hotlines   · National Suicide Prevention Lifeline 954-662-RLHZ (4223)  · [employer] Hope Line Network 800-SUICIDE (256-7844)      Heart Attack  A heart attack (myocardial infarction, MI) causes damage to the heart that cannot be fixed. A heart attack often happens when a blood clot or other blockage cuts blood flow to the heart. When this happens, certain areas of the heart begin to die. This causes the pain you feel during a heart attack.  Follow these instructions at home:  · Take medicine as told by your doctor. You may need medicine to:  ¨ Keep your blood from clotting too easily.  ¨ Control your blood pressure.  ¨ Lower your cholesterol.  ¨ Control abnormal heart rhythms.  · Change certain behaviors as told by your doctor. This may include:  ¨ Quitting smoking.  ¨ Being active.  ¨ Eating a heart-healthy diet. Ask your doctor for help with this diet.  ¨ Keeping a healthy weight.  ¨ Keeping your diabetes under control.  ¨ Lessening stress.  ¨ Limiting how much alcohol you drink.  Do not take these medicines unless your doctor says that you can:  · Nonsteroidal anti-inflammatory drugs (NSAIDs). These include:  ¨ Ibuprofen.  ¨ Naproxen.  ¨ Celecoxib.  · Vitamin supplements that have vitamin A, vitamin E, or both.  · Hormone therapy that contains estrogen with or without progestin.  Get help right away if:  · You have sudden chest discomfort.  · You have sudden discomfort in your:  ¨ Arms.  ¨ Back.  ¨ Neck.  ¨ Jaw.  · You have shortness of breath at any time.  · You have sudden sweating or clammy skin.  · You feel sick to your stomach (nauseous) or  throw up (vomit).  · You suddenly get light-headed or dizzy.  · You feel your heart beating fast or skipping beats.  These symptoms may be an emergency. Do not wait to see if the symptoms will go away. Get medical help right away. Call your local emergency services (911 in the [place] Do not drive yourself to the hospital.   This information is not intended to replace advice given to you by your health care provider. Make sure you discuss any questions you have with your health care provider.  Document Released: 06/18/2013 Document Revised: 05/25/2017 Document Reviewed: 02/20/2015  [employer] Interactive Patient Education © 2017 [employer]      Coronary Angiogram With Stent  Coronary angiogram with stent placement is a procedure to widen or open a narrow blood vessel of the heart (coronary artery). Arteries may become blocked by cholesterol buildup (plaques) in the lining or wall. When a coronary artery becomes partially blocked, blood flow to that area decreases. This may lead to chest pain or a heart attack (myocardial infarction).  A stent is a small piece of metal that looks like mesh or a spring. Stent placement may be done as treatment for a heart attack or right after a coronary angiogram in which a blocked artery is found.  Let your health care provider know about:  · Any allergies you have.  · All medicines you are taking, including vitamins, herbs, eye drops, creams, and over-the-counter medicines.  · Any problems you or family members have had with anesthetic medicines.  · Any blood disorders you have.  · Any surgeries you have had.  · Any medical conditions you have.  · Whether you are pregnant or may be pregnant.  What are the risks?  Generally, this is a safe procedure. However, problems may occur, including:  · Damage to the heart or its blood vessels.  · A return of blockage.  · Bleeding, infection, or bruising at the insertion site.  · A collection of blood under the skin (hematoma) at the insertion  site.  · A blood clot in another part of the body.  · Kidney injury.  · Allergic reaction to the dye or contrast that is used.  · Bleeding into the abdomen (retroperitoneal bleeding).  What happens before the procedure?  Staying hydrated   Follow instructions from your health care provider about hydration, which may include:  · Up to 2 hours before the procedure - you may continue to drink clear liquids, such as water, clear fruit juice, black coffee, and plain tea.  Eating and drinking restrictions   Follow instructions from your health care provider about eating and drinking, which may include:  · 8 hours before the procedure - stop eating heavy meals or foods such as meat, fried foods, or fatty foods.  · 6 hours before the procedure - stop eating light meals or foods, such as toast or cereal.  · 2 hours before the procedure - stop drinking clear liquids.  Ask your health care provider about:  · Changing or stopping your regular medicines. This is especially important if you are taking diabetes medicines or blood thinners.  · Taking medicines such as ibuprofen. These medicines can thin your blood. Do not take these medicines before your procedure if your health care provider instructs you not to. Generally, aspirin is recommended before a procedure of passing a small, thin tube (catheter) through a blood vessel and into the heart (cardiac catheterization).  What happens during the procedure?  · An IV tube will be inserted into one of your veins.  · You will be given one or more of the following:  ¨ A medicine to help you relax (sedative).  ¨ A medicine to numb the area where the catheter will be inserted into an artery (local anesthetic).  · To reduce your risk of infection:  ¨ Your health care team will wash or sanitize their hands.  ¨ Your skin will be washed with soap.  ¨ Hair may be removed from the area where the catheter will be inserted.  · Using a guide wire, the catheter will be inserted into an artery.  The location may be in your groin, in your wrist, or in the fold of your arm (near your elbow).  · A type of X-ray (fluoroscopy) will be used to help guide the catheter to the opening of the arteries in the heart.  · A dye will be injected into the catheter, and X-rays will be taken. The dye will help to show where any narrowing or blockages are located in the arteries.  · A tiny wire will be guided to the blocked spot, and a balloon will be inflated to make the artery wider.  · The stent will be expanded and will crush the plaques into the wall of the vessel. The stent will hold the area open and improve the blood flow. Most stents have a drug coating to reduce the risk of the stent narrowing over time.  · The artery may be made wider using a drill, laser, or other tools to remove plaques.  · When the blood flow is better, the catheter will be removed. The lining of the artery will grow over the stent, which stays where it was placed.  This procedure may vary among health care providers and hospitals.  What happens after the procedure?  · If the procedure is done through the leg, you will be kept in bed lying flat for about 6 hours. You will be instructed to not bend and not cross your legs.  · The insertion site will be checked frequently.  · The pulse in your foot or wrist will be checked frequently.  · You may have additional blood tests, X-rays, and a test that records the electrical activity of your heart (electrocardiogram, or ECG).  This information is not intended to replace advice given to you by your health care provider. Make sure you discuss any questions you have with your health care provider.  Document Released: 06/23/2004 Document Revised: 08/17/2017 Document Reviewed: 07/23/2017  Elsevier Interactive Patient Education © 2017 Else[employer]    Amiodarone tablets  What is this medicine?  AMIODARONE (a STEPHANIE oh da denise) is an antiarrhythmic drug. It helps make your heart beat regularly. Because of the  side effects caused by this medicine, it is only used when other medicines have not worked. It is usually used for heartbeat problems that may be life threatening.  This medicine may be used for other purposes; ask your health care provider or pharmacist if you have questions.  COMMON BRAND NAME(S): Cordarone, Pacerone  What should I tell my health care provider before I take this medicine?  They need to know if you have any of these conditions:  -liver disease  -lung disease  -other heart problems  -thyroid disease  -an unusual or allergic reaction to amiodarone, iodine, other medicines, foods, dyes, or preservatives  -pregnant or trying to get pregnant  -breast-feeding  How should I use this medicine?  Take this medicine by mouth with a glass of water. Follow the directions on the prescription label. You can take this medicine with or without food. However, you should always take it the same way each time. Take your doses at regular intervals. Do not take your medicine more often than directed. Do not stop taking except on the advice of your doctor or health care professional.  A special MedGuide will be given to you by the pharmacist with each prescription and refill. Be sure to read this information carefully each time.  Talk to your pediatrician regarding the use of this medicine in children. Special care may be needed.  Overdosage: If you think you have taken too much of this medicine contact a poison control center or emergency room at once.  NOTE: This medicine is only for you. Do not share this medicine with others.  What if I miss a dose?  If you miss a dose, take it as soon as you can. If it is almost time for your next dose, take only that dose. Do not take double or extra doses.  What may interact with this medicine?  Do not take this medicine with any of the following medications:  -abarelix  -apomorphine  -arsenic trioxide  -certain antibiotics like erythromycin, gemifloxacin, levofloxacin,  pentamidine  -certain medicines for depression like amoxapine, tricyclic antidepressants  -certain medicines for fungal infections like fluconazole, itraconazole, ketoconazole, posaconazole, voriconazole  -certain medicines for irregular heart beat like disopyramide, dofetilide, dronedarone, ibutilide, propafenone, sotalol  -certain medicines for malaria like chloroquine, halofantrine  -cisapride  -droperidol  -haloperidol  -hawthorn  -maprotiline  -methadone  -phenothiazines like chlorpromazine, mesoridazine, thioridazine  -pimozide  -ranolazine  -red yeast rice  -vardenafil  -ziprasidone  This medicine may also interact with the following medications:  -antiviral medicines for HIV or AIDS  -certain medicines for blood pressure, heart disease, irregular heart beat  -certain medicines for cholesterol like atorvastatin, cerivastatin, lovastatin, simvastatin  -certain medicines for hepatitis C like sofosbuvir and ledipasvir; sofosbuvir  -certain medicines for seizures like phenytoin  -certain medicines for thyroid problems  -certain medicines that treat or prevent blood clots like warfarin  -cholestyramine  -cimetidine  -clopidogrel  -cyclosporine  -dextromethorphan  -diuretics  -fentanyl  -general anesthetics  -grapefruit juice  -lidocaine  -loratadine  -methotrexate  -other medicines that prolong the QT interval (cause an abnormal heart rhythm)  -procainamide  -quinidine  -rifabutin, rifampin, or rifapentine  -Pina's Wort  -trazodone  This list may not describe all possible interactions. Give your health care provider a list of all the medicines, herbs, non-prescription drugs, or dietary supplements you use. Also tell them if you smoke, drink alcohol, or use illegal drugs. Some items may interact with your medicine.  What should I watch for while using this medicine?  Your condition will be monitored closely when you first begin therapy. Often, this drug is first started in a hospital or other monitored health  care setting. Once you are on maintenance therapy, visit your doctor or health care professional for regular checks on your progress. Because your condition and use of this medicine carry some risk, it is a good idea to carry an identification card, necklace or bracelet with details of your condition, medications, and doctor or health care professional.  You may get drowsy or dizzy. Do not drive, use machinery, or do anything that needs mental alertness until you know how this medicine affects you. Do not stand or sit up quickly, especially if you are an older patient. This reduces the risk of dizzy or fainting spells.  This medicine can make you more sensitive to the sun. Keep out of the sun. If you cannot avoid being in the sun, wear protective clothing and use sunscreen. Do not use sun lamps or tanning beds/booths.  You should have regular eye exams before and during treatment. Call your doctor if you have blurred vision, see halos, or your eyes become sensitive to light. Your eyes may get dry. It may be helpful to use a lubricating eye solution or artificial tears solution.  If you are going to have surgery or a procedure that requires contrast dyes, tell your doctor or health care professional that you are taking this medicine.  What side effects may I notice from receiving this medicine?  Side effects that you should report to your doctor or health care professional as soon as possible:  -allergic reactions like skin rash, itching or hives, swelling of the face, lips, or tongue  -blue-gray coloring of the skin  -blurred vision, seeing blue green halos, increased sensitivity of the eyes to light  -breathing problems  -chest pain  -dark urine  -fast, irregular heartbeat  -feeling faint or light-headed  -intolerance to heat or cold  -nausea or vomiting  -pain and swelling of the scrotum  -pain, tingling, numbness in feet, hands  -redness, blistering, peeling or loosening of the skin, including inside the  mouth  -spitting up blood  -stomach pain  -sweating  -unusual or uncontrolled movements of body  -unusually weak or tired  -weight gain or loss  -yellowing of the eyes or skin  Side effects that usually do not require medical attention (report to your doctor or health care professional if they continue or are bothersome):  -change in sex drive or performance  -constipation  -dizziness  -headache  -loss of appetite  -trouble sleeping  This list may not describe all possible side effects. Call your doctor for medical advice about side effects. You may report side effects to FDA at 3-564-FDA-0038.  Where should I keep my medicine?  Keep out of the reach of children.  Store at room temperature between 20 and 25 degrees C (68 and 77 degrees F). Protect from light. Keep container tightly closed. Throw away any unused medicine after the expiration date.  NOTE: This sheet is a summary. It may not cover all possible information. If you have questions about this medicine, talk to your doctor, pharmacist, or health care provider.  © 2018 Elsevier/[employer] (2015-03-23 19:48:11)    Apixaban oral tablets  What is this medicine?  APIXABAN (a PIX a ban) is an anticoagulant (blood thinner). It is used to lower the chance of stroke in people with a medical condition called atrial fibrillation. It is also used to treat or prevent blood clots in the lungs or in the veins.  This medicine may be used for other purposes; ask your health care provider or pharmacist if you have questions.  COMMON BRAND NAME(S): Eliquis  What should I tell my health care provider before I take this medicine?  They need to know if you have any of these conditions:  -bleeding disorders  -bleeding in the brain  -blood in your stools (black or tarry stools) or if you have blood in your vomit  -history of stomach bleeding  -kidney disease  -liver disease  -mechanical heart valve  -an unusual or allergic reaction to apixaban, other medicines, foods, dyes, or  preservatives  -pregnant or trying to get pregnant  -breast-feeding  How should I use this medicine?  Take this medicine by mouth with a glass of water. Follow the directions on the prescription label. You can take it with or without food. If it upsets your stomach, take it with food. Take your medicine at regular intervals. Do not take it more often than directed. Do not stop taking except on your doctor's advice. Stopping this medicine may increase your risk of a blot clot. Be sure to refill your prescription before you run out of medicine.  Talk to your pediatrician regarding the use of this medicine in children. Special care may be needed.  Overdosage: If you think you have taken too much of this medicine contact a poison control center or emergency room at once.  NOTE: This medicine is only for you. Do not share this medicine with others.  What if I miss a dose?  If you miss a dose, take it as soon as you can. If it is almost time for your next dose, take only that dose. Do not take double or extra doses.  What may interact with this medicine?  This medicine may interact with the following:  -aspirin and aspirin-like medicines  -certain medicines for fungal infections like ketoconazole and itraconazole  -certain medicines for seizures like carbamazepine and phenytoin  -certain medicines that treat or prevent blood clots like warfarin, enoxaparin, and dalteparin  -clarithromycin  -NSAIDs, medicines for pain and inflammation, like ibuprofen or naproxen  -rifampin  -ritonavir  -Pina's wort  This list may not describe all possible interactions. Give your health care provider a list of all the medicines, herbs, non-prescription drugs, or dietary supplements you use. Also tell them if you smoke, drink alcohol, or use illegal drugs. Some items may interact with your medicine.  What should I watch for while using this medicine?  Visit your doctor or health care professional for regular checks on your  progress.  Notify your doctor or health care professional and seek emergency treatment if you develop breathing problems; changes in vision; chest pain; severe, sudden headache; pain, swelling, warmth in the leg; trouble speaking; sudden numbness or weakness of the face, arm or leg. These can be signs that your condition has gotten worse.  If you are going to have surgery or other procedure, tell your doctor that you are taking this medicine.  What side effects may I notice from receiving this medicine?  Side effects that you should report to your doctor or health care professional as soon as possible:  -allergic reactions like skin rash, itching or hives, swelling of the face, lips, or tongue  -signs and symptoms of bleeding such as bloody or black, tarry stools; red or dark-brown urine; spitting up blood or brown material that looks like coffee grounds; red spots on the skin; unusual bruising or bleeding from the eye, gums, or nose  This list may not describe all possible side effects. Call your doctor for medical advice about side effects. You may report side effects to FDA at 1-232-FDA-9380.  Where should I keep my medicine?  Keep out of the reach of children.  Store at room temperature between 20 and 25 degrees C (68 and 77 degrees F). Throw away any unused medicine after the expiration date.  NOTE: This sheet is a summary. It may not cover all possible information. If you have questions about this medicine, talk to your doctor, pharmacist, or health care provider.  © 2018 Elsevier/[employer] (2017-07-10 11:54:23)      Aspirin and Your Heart   Aspirin is a medicine that affects the way blood clots. Aspirin can be used to help reduce the risk of blood clots, heart attacks, and other heart-related problems.   SHOULD I TAKE ASPIRIN?  Your health care provider will help you determine whether it is safe and beneficial for you to take aspirin daily. Taking aspirin daily may be beneficial if you:  · Have had a  heart attack or chest pain.  · Have undergone open heart surgery such as coronary artery bypass surgery (CABG).  · Have had coronary angioplasty.  · Have experienced a stroke or transient ischemic attack (TIA).  · Have peripheral vascular disease (PVD).  · Have chronic heart rhythm problems such as atrial fibrillation.  ARE THERE ANY RISKS OF TAKING ASPIRIN DAILY?  Daily use of aspirin can increase your risk of side effects. Some of these include:  · Bleeding. Bleeding problems can be minor or serious. An example of a minor problem is a cut that does not stop bleeding. An example of a more serious problem is stomach bleeding or bleeding into the brain. Your risk of bleeding is increased if you are also taking non-steroidal anti-inflammatory medicine (NSAIDs).  · Increased bruising.  · Upset stomach.  · An allergic reaction. People who have nasal polyps have an increased risk of developing an aspirin allergy.  WHAT ARE SOME GUIDELINES I SHOULD FOLLOW WHEN TAKING ASPIRIN?   · Take aspirin only as directed by your health care provider. Make sure you understand how much you should take and what form you should take. The two forms of aspirin are:  ¨ Non-enteric-coated. This type of aspirin does not have a coating and is absorbed quickly. Non-enteric-coated aspirin is usually recommended for people with chest pain. This type of aspirin also comes in a chewable form.  ¨ Enteric-coated. This type of aspirin has a special coating that releases the medicine very slowly. Enteric-coated aspirin causes less stomach upset than non-enteric-coated aspirin. This type of aspirin should not be chewed or crushed.  · Drink alcohol in moderation. Drinking alcohol increases your risk of bleeding.  WHEN SHOULD I SEEK MEDICAL CARE?   · You have unusual bleeding or bruising.  · You have stomach pain.  · You have an allergic reaction. Symptoms of an allergic reaction include:  ¨ Hives.  ¨ Itchy skin.  ¨ Swelling of the lips, tongue, or  face.  · You have ringing in your ears.  WHEN SHOULD I SEEK IMMEDIATE MEDICAL CARE?   · Your bowel movements are bloody, dark red, or black in color.  · You vomit or cough up blood.  · You have blood in your urine.  · You cough, wheeze, or feel short of breath.  If you have any of the following symptoms, this is an emergency. Do not wait to see if the pain will go away. Get medical help at once. Call your local emergency services (911 in the [place] Do not drive yourself to the hospital.  · You have severe chest pain, especially if the pain is crushing or pressure-like and spreads to the arms, back, neck, or jaw.   · You have stroke-like symptoms, such as:    ¨ Loss of vision.    ¨ Difficulty talking.    ¨ Numbness or weakness on one side of your body.    ¨ Numbness or weakness in your arm or leg.    ¨ Not thinking clearly or feeling confused.       This information is not intended to replace advice given to you by your health care provider. Make sure you discuss any questions you have with your health care provider.     Document Released: 11/30/2009 Document Revised: 01/08/2016 Document Reviewed: 03/25/2015  [facility] Interactive Patient Education ©2016 [employer]    Atorvastatin tablets  What is this medicine?  ATORVASTATIN (a TORE va sta tin) is known as a HMG-CoA reductase inhibitor or 'statin'. It lowers the level of cholesterol and triglycerides in the blood. This drug may also reduce the risk of heart attack, stroke, or other health problems in patients with risk factors for heart disease. Diet and lifestyle changes are often used with this drug.  This medicine may be used for other purposes; ask your health care provider or pharmacist if you have questions.  COMMON BRAND NAME(S): Lipitor  What should I tell my health care provider before I take this medicine?  They need to know if you have any of these conditions:  -frequently drink alcoholic beverages  -history of stroke, TIA  -kidney disease  -liver  disease  -muscle aches or weakness  -other medical condition  -an unusual or allergic reaction to atorvastatin, other medicines, foods, dyes, or preservatives  -pregnant or trying to get pregnant  -breast-feeding  How should I use this medicine?  Take this medicine by mouth with a glass of water. Follow the directions on the prescription label. You can take this medicine with or without food. Take your doses at regular intervals. Do not take your medicine more often than directed.  Talk to your pediatrician regarding the use of this medicine in children. While this drug may be prescribed for children as young as 10 years old for selected conditions, precautions do apply.  Overdosage: If you think you have taken too much of this medicine contact a poison control center or emergency room at once.  NOTE: This medicine is only for you. Do not share this medicine with others.  What if I miss a dose?  If you miss a dose, take it as soon as you can. If it is almost time for your next dose, take only that dose. Do not take double or extra doses.  What may interact with this medicine?  Do not take this medicine with any of the following medications:  -red yeast rice  -telaprevir  -telithromycin  -voriconazole  This medicine may also interact with the following medications:  -alcohol  -antiviral medicines for HIV or AIDS  -boceprevir  -certain antibiotics like clarithromycin, erythromycin, troleandomycin  -certain medicines for cholesterol like fenofibrate or gemfibrozil  -cimetidine  -clarithromycin  -colchicine  -cyclosporine  -digoxin  -female hormones, like estrogens or progestins and birth control pills  -grapefruit juice  -medicines for fungal infections like fluconazole, itraconazole, ketoconazole  -niacin  -rifampin  -spironolactone  This list may not describe all possible interactions. Give your health care provider a list of all the medicines, herbs, non-prescription drugs, or dietary supplements you use. Also tell  them if you smoke, drink alcohol, or use illegal drugs. Some items may interact with your medicine.  What should I watch for while using this medicine?  Visit your doctor or health care professional for regular check-ups. You may need regular tests to make sure your liver is working properly.  Tell your doctor or health care professional right away if you get any unexplained muscle pain, tenderness, or weakness, especially if you also have a fever and tiredness. Your doctor or health care professional may tell you to stop taking this medicine if you develop muscle problems. If your muscle problems do not go away after stopping this medicine, contact your health care professional.  This drug is only part of a total heart-health program. Your doctor or a dietician can suggest a low-cholesterol and low-fat diet to help. Avoid alcohol and smoking, and keep a proper exercise schedule.  Do not use this drug if you are pregnant or breast-feeding. Serious side effects to an unborn child or to an infant are possible. Talk to your doctor or pharmacist for more information.  This medicine may affect blood sugar levels. If you have diabetes, check with your doctor or health care professional before you change your diet or the dose of your diabetic medicine.  If you are going to have surgery tell your health care professional that you are taking this drug.  What side effects may I notice from receiving this medicine?  Side effects that you should report to your doctor or health care professional as soon as possible:  -allergic reactions like skin rash, itching or hives, swelling of the face, lips, or tongue  -dark urine  -fever  -joint pain  -muscle cramps, pain  -redness, blistering, peeling or loosening of the skin, including inside the mouth  -trouble passing urine or change in the amount of urine  -unusually weak or tired  -yellowing of eyes or skin  Side effects that usually do not require medical attention (report to your  doctor or health care professional if they continue or are bothersome):  -constipation  -heartburn  -stomach gas, pain, upset  This list may not describe all possible side effects. Call your doctor for medical advice about side effects. You may report side effects to FDA at 7-970-FDA-6020.  Where should I keep my medicine?  Keep out of the reach of children.  Store at room temperature between 20 to 25 degrees C (68 to 77 degrees F). Throw away any unused medicine after the expiration date.  NOTE: This sheet is a summary. It may not cover all possible information. If you have questions about this medicine, talk to your doctor, pharmacist, or health care provider.  © 2018 Elsevier/Gold Standard (2012-11-06 09:18:24)    Clopidogrel tablets  What is this medicine?  CLOPIDOGREL (kloh PID oh grel) helps to prevent blood clots. This medicine is used to prevent heart attack, stroke, or other vascular events in people who are at high risk.  This medicine may be used for other purposes; ask your health care provider or pharmacist if you have questions.  COMMON BRAND NAME(S): Plavix  What should I tell my health care provider before I take this medicine?  They need to know if you have any of the following conditions:  -bleeding disorders  -bleeding in the brain  -having surgery  -history of stomach bleeding  -an unusual or allergic reaction to clopidogrel, other medicines, foods, dyes, or preservatives  -pregnant or trying to get pregnant  -breast-feeding  How should I use this medicine?  Take this medicine by mouth with a glass of water. Follow the directions on the prescription label. You may take this medicine with or without food. If it upsets your stomach, take it with food. Take your medicine at regular intervals. Do not take it more often than directed. Do not stop taking except on your doctor's advice.  A special MedGuide will be given to you by the pharmacist with each prescription and refill. Be sure to read this  information carefully each time.  Talk to your pediatrician regarding the use of this medicine in children. Special care may be needed.  Overdosage: If you think you have taken too much of this medicine contact a poison control center or emergency room at once.  NOTE: This medicine is only for you. Do not share this medicine with others.  What if I miss a dose?  If you miss a dose, take it as soon as you can. If it is almost time for your next dose, take only that dose. Do not take double or extra doses.  What may interact with this medicine?  Do not take this medicine with the following medications:  -dasabuvir; ombitasvir; paritaprevir; ritonavir  -defibrotide  This medicine may also interact with the following medications:  -antiviral medicines for HIV or AIDS  -aspirin  -certain medicines for depression like citalopram, fluoxetine, fluvoxamine  -certain medicines for fungal infections like ketoconazole, fluconazole, voriconazole  -certain medicines for seizures like felbamate, oxcarbazepine, phenytoin  -certain medicines for stomach problems like cimetidine, omeprazole, esomeprazole  -certain medicines that treat or prevent blood clots like warfarin, enoxaparin, dalteparin, apixaban, dabigatran, rivaroxaban, ticlopidine  -chloramphenicol  -cilostazol  -fluvastatin  -isoniazid  -modafinil  -nicardipine  -NSAIDS, medicines for pain and inflammation, like ibuprofen or naproxen  -quinine  -repaglinide  -tamoxifen  -tolbutamide  -topiramate  -torsemide  This list may not describe all possible interactions. Give your health care provider a list of all the medicines, herbs, non-prescription drugs, or dietary supplements you use. Also tell them if you smoke, drink alcohol, or use illegal drugs. Some items may interact with your medicine.  What should I watch for while using this medicine?  Visit your doctor or health care professional for regular check ups. Do not stop taking your medicine unless your doctor tells you  to.  Notify your doctor or health care professional and seek emergency treatment if you develop breathing problems; changes in vision; chest pain; severe, sudden headache; pain, swelling, warmth in the leg; trouble speaking; sudden numbness or weakness of the face, arm or leg. These can be signs that your condition has gotten worse.  If you are going to have surgery or dental work, tell your doctor or health care professional that you are taking this medicine.  Certain genetic factors may reduce the effect of this medicine. Your doctor may use genetic tests to determine treatment.  What side effects may I notice from receiving this medicine?  Side effects that you should report to your doctor or health care professional as soon as possible:  -allergic reactions like skin rash, itching or hives, swelling of the face, lips, or tongue  -signs and symptoms of bleeding such as bloody or black, tarry stools; red or dark-brown urine; spitting up blood or brown material that looks like coffee grounds; red spots on the skin; unusual bruising or bleeding from the eye, gums, or nose  -signs and symptoms of a blood clot such as breathing problems; changes in vision; chest pain; severe, sudden headache; pain, swelling, warmth in the leg; trouble speaking; sudden numbness or weakness of the face, arm or leg  Side effects that usually do not require medical attention (report to your doctor or health care professional if they continue or are bothersome):  -constipation  -diarrhea  -headache  -upset stomach  This list may not describe all possible side effects. Call your doctor for medical advice about side effects. You may report side effects to FDA at 8-704-FDA-7037.  Where should I keep my medicine?  Keep out of the reach of children.  Store at room temperature of 59 to 86 degrees F (15 to 30 degrees C). Throw away any unused medicine after the expiration date.  NOTE: This sheet is a summary. It may not cover all possible  information. If you have questions about this medicine, talk to your doctor, pharmacist, or health care provider.  © 2018 Elsevier/Gold Standard (2016-09-22 10:00:44)

## 2019-12-18 LAB
ALBUMIN SERPL BCP-MCNC: 2.9 G/DL (ref 3.2–4.9)
ALBUMIN/GLOB SERPL: 1.2 G/DL
ALP SERPL-CCNC: 57 U/L (ref 30–99)
ALT SERPL-CCNC: 32 U/L (ref 2–50)
ANION GAP SERPL CALC-SCNC: 11 MMOL/L (ref 0–11.9)
AST SERPL-CCNC: 151 U/L (ref 12–45)
BILIRUB SERPL-MCNC: 1.2 MG/DL (ref 0.1–1.5)
BUN SERPL-MCNC: 14 MG/DL (ref 8–22)
CALCIUM SERPL-MCNC: 8.3 MG/DL (ref 8.5–10.5)
CHLORIDE SERPL-SCNC: 110 MMOL/L (ref 96–112)
CO2 SERPL-SCNC: 17 MMOL/L (ref 20–33)
CREAT SERPL-MCNC: 0.93 MG/DL (ref 0.5–1.4)
GLOBULIN SER CALC-MCNC: 2.4 G/DL (ref 1.9–3.5)
GLUCOSE SERPL-MCNC: 103 MG/DL (ref 65–99)
POTASSIUM SERPL-SCNC: 3.5 MMOL/L (ref 3.6–5.5)
PROT SERPL-MCNC: 5.3 G/DL (ref 6–8.2)
SODIUM SERPL-SCNC: 138 MMOL/L (ref 135–145)

## 2019-12-26 ENCOUNTER — NON-PROVIDER VISIT (OUTPATIENT)
Dept: CARDIOLOGY | Facility: MEDICAL CENTER | Age: 79
End: 2019-12-26
Payer: MEDICARE

## 2019-12-26 DIAGNOSIS — Z95.5 H/O HEART ARTERY STENT: ICD-10-CM

## 2019-12-26 NOTE — PROGRESS NOTES
Chief Complaint   Patient presents with   • Hospital Follow-up       Subjective:   Alondra Otero is a 79 y.o. female who presents today with her two sons for hospital follow up.     Patient was admitted on 12/7/2019 with acute infero-posterior STEMI.  Patient underwent emergent partial revascularization due to procedural aortic dissection.  On arrival to the cardiac Cath Lab patient was also noted to be in second-degree AV block and a temporary pacemaker was placed patient also had atrial fibrillation with RVR and anemia post procedure.    Past medical history significant for hypertension.    Today patient states that her primary complaint today is fatigue.  Her 2 sons that are with her today at her appointment are also concerned about her lack of energy and appetite.  Patient also has had 1 or 2 episodes of some blood-tinged discharge from nose when she blows her nose, denies any jimbo nosebleeds.    Past Medical History:   Diagnosis Date   • Heart burn    • Hypertension      Past Surgical History:   Procedure Laterality Date   • RHYTIDECTOMY  10/15/2012    Performed by Nelsy Sun M.D. at SURGERY [facility] ORS   • BLEPHAROPLASTY  10/15/2012    Performed by Nelsy Sun M.D. at [facility]   • CHOLECYSTECTOMY  2004   • HYSTERECTOMY LAPAROSCOPY  1975     History reviewed. No pertinent family history.  Social History     Socioeconomic History   • Marital status:      Spouse name: Not on file   • Number of children: Not on file   • Years of education: Not on file   • Highest education level: Not on file   Occupational History   • Not on file   Social Needs   • Financial resource strain: Not on file   • Food insecurity:     Worry: Not on file     Inability: Not on file   • Transportation needs:     Medical: Not on file     Non-medical: Not on file   Tobacco Use   • Smoking status: Never Smoker   • Smokeless tobacco: Never Used   Substance and Sexual Activity   • Alcohol use:  Yes     Comment: 1 per week   • Drug use: No   • Sexual activity: Not on file   Lifestyle   • Physical activity:     Days per week: Not on file     Minutes per session: Not on file   • Stress: Not on file   Relationships   • Social connections:     Talks on phone: Not on file     Gets together: Not on file     Attends Confucianism service: Not on file     Active member of club or organization: Not on file     Attends meetings of clubs or organizations: Not on file     Relationship status: Not on file   • Intimate partner violence:     Fear of current or ex partner: Not on file     Emotionally abused: Not on file     Physically abused: Not on file     Forced sexual activity: Not on file   Other Topics Concern   • Not on file   Social History Narrative   • Not on file     Allergies   Allergen Reactions   • Sulfa Drugs Vomiting     Outpatient Encounter Medications as of 12/27/2019   Medication Sig Dispense Refill   • Esomeprazole Magnesium 20 MG Pack Take 20 mg by mouth every day.     • fexofenadine (ALLEGRA) 180 MG tablet Take 180 mg by mouth every day.     • Melatonin 5 MG Tab Take 5 mg by mouth 1 time daily as needed.     • acetaminophen (TYLENOL) 500 MG Tab Take 500-1,000 mg by mouth 2 Times a Day.     • amiodarone (CORDARONE) 200 MG Tab Take 1 Tab by mouth every evening. 90 Tab 3   • atorvastatin (LIPITOR) 40 MG Tab Take 1 Tab by mouth every evening. 90 Tab 3   • apixaban (ELIQUIS) 5mg Tab Take 1 Tab by mouth 2 Times a Day. 180 Tab 3   • clopidogrel (PLAVIX) 75 MG Tab Take 1 Tab by mouth every day. 90 Tab 3   • atenolol (TENORMIN) 25 MG Tab Take 1 Tab by mouth every evening. 90 Tab 3   • aspirin (ASA) 81 MG Chew Tab chewable tablet Take 1 Tab by mouth every day. 100 Tab 3   • [DISCONTINUED] amiodarone (PACERONE) 400 MG tablet Take 1 Tab by mouth every day. 30 Tab 1   • [DISCONTINUED] apixaban (ELIQUIS) 5mg Tab Take 1 Tab by mouth 2 Times a Day. 60 Tab 6   • [DISCONTINUED] atorvastatin (LIPITOR) 40 MG Tab Take 1 Tab  "by mouth every evening. 30 Tab 11   • [DISCONTINUED] clopidogrel (PLAVIX) 75 MG Tab Take 1 Tab by mouth every day. 30 Tab 0   • [DISCONTINUED] atenolol (TENORMIN) 50 MG Tab Take 50 mg by mouth every day.       No facility-administered encounter medications on file as of 12/27/2019.      Review of Systems   Constitutional: Positive for malaise/fatigue. Negative for weight loss.   Respiratory: Negative for shortness of breath.    Cardiovascular: Negative for chest pain, palpitations, orthopnea, claudication, leg swelling and PND.   Neurological: Positive for weakness. Negative for dizziness.   Endo/Heme/Allergies: Bruises/bleeds easily.   All other systems reviewed and are negative.       Objective:   /64 (BP Location: Left arm, Patient Position: Sitting, BP Cuff Size: Adult)   Pulse 68   Ht 1.575 m (5' 2\")   Wt 71 kg (156 lb 9.6 oz)   SpO2 98%   BMI 28.64 kg/m²      Physical Exam   Constitutional: She is oriented to person, place, and time. She appears well-developed and well-nourished. No distress.   HENT:   Head: Normocephalic.   Eyes: EOM are normal.   Neck: No JVD present.   Cardiovascular: Normal rate, regular rhythm and normal heart sounds.   Pulmonary/Chest: Breath sounds normal. No respiratory distress.   Abdominal: Soft. There is no tenderness.   Musculoskeletal:         General: No edema.   Neurological: She is alert and oriented to person, place, and time.   Skin: Skin is warm and dry.   Right radial cath site is uncomplicated with out hematoma, erythema or discharge with intact sensation and circulation.   Psychiatric: She has a normal mood and affect. Her behavior is normal.        Thoracoabdominal CT-CTA 12/17/19:  1.  Intramural hematoma of the ascending aorta has decreased in size and density. It extends approximately FDC through the ascending aorta. No dissection flap. No aortic aneurysm.  2.  No acute abnormality in the chest, abdomen or pelvis.  3.  Stable 2.2 cm left thyroid nodule " can be further evaluated with outpatient ultrasound.  4.  Colonic diverticulosis.     Echocardiogram 12/11/19:  CONCLUSIONS  No prior study is available for comparison.   Technically difficult study.   Normal left ventricular chamber size.  Inferior wall hypokinesis.  Left ventricular systolic function is normal.  Left ventricular ejection fraction is visually estimated to be 55-60%.  Normal left ventricular wall thickness.  Normal pericardium without effusion.       Assessment:     1. Coronary artery disease involving native coronary artery of native heart without angina pectoris  Comp Metabolic Panel   2. PAF (paroxysmal atrial fibrillation) ([facility])  TSH+FREE T4   3. Chronic anticoagulation  CBC WITH DIFFERENTIAL   4. Acute ST elevation myocardial infarction (STEMI) of inferoposterior wall ([facility])     5. Ascending aortic dissection ([facility])     6. Atrial fibrillation with RVR ([facility])     7. Other iron deficiency anemia         Medical Decision Making:  Today's Assessment / Status / Plan:     CAD S/P Stemi:  -S/P partial revascularization of the RCA (NILSA to distal RCA, BMS to ostial RCA and PTCA of PDA) on 12/11/2019 which was complicated by contained a sending aortic dissection.  -Continue Plavix 75 mg daily and atorvastatin 40 mg daily.  -Reduce Atenolol to 25 mg, take in evening to help with fatigue.   -Patient advised to discontinue daily ASA on 1/8/2019.     Ascending aortic dissection:  -Cardiothoracic surgery was consulted while patient was hospitalized, surgical intervention was not felt to be necessary and recommended observation.  -Repeat CTA of aorta/chest/pelvis completed prior to discharge showed decrease in size and density.     PAF:  -Occurred port STEMI, reverted with IV amio.   -Rate regular upon auscultation today.  -Reduce amiodarone to 200 mg daily (s/p 10 g loading).  -TSH ordered.   -OAC with Eliquis 5 mg twice daily.    Anemia:  -Repeat CBC.     Patient will follow-up with myself in 1 month for pulse  and BP check or earlier if needed encouraged patient to contact office should any questions or concerns arise in the meantime.And  are as scheduled below.  Patient understands and agrees with plan of care.      Future Appointments   Date Time Provider Department Center   1/20/2020  1:45 PM KENISHA Matos [facility] None   2/28/2020 10:00 AM Chelsea Armstrong M.D. [facility] None       Collaborating Provider: Dr. Chelsea Armstrong        Please note that this dictation was created using voice recognition software. I have made every reasonable attempt to correct obvious errors, but I expect that there are errors of grammar and possibly content I did not discover before finalizing the note.

## 2019-12-27 ENCOUNTER — OFFICE VISIT (OUTPATIENT)
Dept: CARDIOLOGY | Facility: MEDICAL CENTER | Age: 79
End: 2019-12-27
Payer: MEDICARE

## 2019-12-27 VITALS
WEIGHT: 156.6 LBS | SYSTOLIC BLOOD PRESSURE: 118 MMHG | DIASTOLIC BLOOD PRESSURE: 64 MMHG | HEIGHT: 62 IN | OXYGEN SATURATION: 98 % | BODY MASS INDEX: 28.82 KG/M2 | HEART RATE: 68 BPM

## 2019-12-27 DIAGNOSIS — D50.8 OTHER IRON DEFICIENCY ANEMIA: ICD-10-CM

## 2019-12-27 DIAGNOSIS — I71.010 ASCENDING AORTIC DISSECTION (HCC): ICD-10-CM

## 2019-12-27 DIAGNOSIS — I48.0 PAF (PAROXYSMAL ATRIAL FIBRILLATION) (HCC): ICD-10-CM

## 2019-12-27 DIAGNOSIS — I25.10 CORONARY ARTERY DISEASE INVOLVING NATIVE CORONARY ARTERY OF NATIVE HEART WITHOUT ANGINA PECTORIS: ICD-10-CM

## 2019-12-27 DIAGNOSIS — I21.19: ICD-10-CM

## 2019-12-27 DIAGNOSIS — Z79.01 CHRONIC ANTICOAGULATION: ICD-10-CM

## 2019-12-27 DIAGNOSIS — I48.91 ATRIAL FIBRILLATION WITH RVR (HCC): ICD-10-CM

## 2019-12-27 PROBLEM — Z00.00 HEALTHCARE MAINTENANCE: Status: RESOLVED | Noted: 2017-10-16 | Resolved: 2019-12-27

## 2019-12-27 PROBLEM — I44.1 SECOND DEGREE HEART BLOCK: Status: RESOLVED | Noted: 2019-12-11 | Resolved: 2019-12-27

## 2019-12-27 PROBLEM — D72.829 LEUKOCYTOSIS: Status: RESOLVED | Noted: 2019-12-12 | Resolved: 2019-12-27

## 2019-12-27 PROCEDURE — 99214 OFFICE O/P EST MOD 30 MIN: CPT | Performed by: NURSE PRACTITIONER

## 2019-12-27 RX ORDER — AMIODARONE HYDROCHLORIDE 200 MG/1
200 TABLET ORAL EVERY EVENING
Qty: 90 TAB | Refills: 3 | Status: SHIPPED | OUTPATIENT
Start: 2019-12-27 | End: 2020-01-20

## 2019-12-27 RX ORDER — ATENOLOL 25 MG/1
25 TABLET ORAL EVERY EVENING
Qty: 90 TAB | Refills: 3 | Status: SHIPPED | OUTPATIENT
Start: 2019-12-27 | End: 2020-01-20 | Stop reason: SDUPTHER

## 2019-12-27 RX ORDER — ATORVASTATIN CALCIUM 40 MG/1
40 TABLET, FILM COATED ORAL EVERY EVENING
Qty: 90 TAB | Refills: 3 | Status: SHIPPED | OUTPATIENT
Start: 2019-12-27 | End: 2020-07-16 | Stop reason: SDUPTHER

## 2019-12-27 RX ORDER — ACETAMINOPHEN 500 MG
1000 TABLET ORAL
COMMUNITY

## 2019-12-27 RX ORDER — CHOLECALCIFEROL (VITAMIN D3) 125 MCG
5 CAPSULE ORAL
COMMUNITY
End: 2020-10-20

## 2019-12-27 RX ORDER — ESOMEPRAZOLE MAGNESIUM 20 MG/1
20 GRANULE, DELAYED RELEASE ORAL DAILY
COMMUNITY
End: 2020-01-20

## 2019-12-27 RX ORDER — CLOPIDOGREL BISULFATE 75 MG/1
75 TABLET ORAL DAILY
Qty: 90 TAB | Refills: 3 | Status: SHIPPED | OUTPATIENT
Start: 2019-12-27 | End: 2020-08-28

## 2019-12-27 RX ORDER — FEXOFENADINE HCL 180 MG/1
180 TABLET ORAL PRN
COMMUNITY
End: 2020-10-20

## 2019-12-27 ASSESSMENT — ENCOUNTER SYMPTOMS
ORTHOPNEA: 0
SHORTNESS OF BREATH: 0
PND: 0
WEAKNESS: 1
WEIGHT LOSS: 0
PALPITATIONS: 0
BRUISES/BLEEDS EASILY: 1
DIZZINESS: 0
CLAUDICATION: 0

## 2019-12-31 LAB
ALBUMIN SERPL-MCNC: 3.7 G/DL (ref 3.5–4.7)
ALBUMIN/GLOB SERPL: 1.7 {RATIO} (ref 1.2–2.2)
ALP SERPL-CCNC: 87 IU/L (ref 39–117)
ALT SERPL-CCNC: 11 IU/L (ref 0–32)
AST SERPL-CCNC: 13 IU/L (ref 0–40)
BASOPHILS # BLD AUTO: 0 X10E3/UL (ref 0–0.2)
BASOPHILS NFR BLD AUTO: 0 %
BILIRUB SERPL-MCNC: 0.5 MG/DL (ref 0–1.2)
BUN SERPL-MCNC: 15 MG/DL (ref 8–27)
BUN/CREAT SERPL: 13 (ref 12–28)
CALCIUM SERPL-MCNC: 8.9 MG/DL (ref 8.7–10.3)
CHLORIDE SERPL-SCNC: 102 MMOL/L (ref 96–106)
CO2 SERPL-SCNC: 21 MMOL/L (ref 20–29)
CREAT SERPL-MCNC: 1.12 MG/DL (ref 0.57–1)
EOSINOPHIL # BLD AUTO: 0.1 X10E3/UL (ref 0–0.4)
EOSINOPHIL NFR BLD AUTO: 1 %
ERYTHROCYTE [DISTWIDTH] IN BLOOD BY AUTOMATED COUNT: 15.3 % (ref 12.3–15.4)
GLOBULIN SER CALC-MCNC: 2.2 G/DL (ref 1.5–4.5)
GLUCOSE SERPL-MCNC: 93 MG/DL (ref 65–99)
HCT VFR BLD AUTO: 32 % (ref 34–46.6)
HGB BLD-MCNC: 10.7 G/DL (ref 11.1–15.9)
IMM GRANULOCYTES # BLD AUTO: 0 X10E3/UL (ref 0–0.1)
IMM GRANULOCYTES NFR BLD AUTO: 0 %
IMMATURE CELLS  115398: ABNORMAL
LYMPHOCYTES # BLD AUTO: 1.2 X10E3/UL (ref 0.7–3.1)
LYMPHOCYTES NFR BLD AUTO: 17 %
MCH RBC QN AUTO: 30.5 PG (ref 26.6–33)
MCHC RBC AUTO-ENTMCNC: 33.4 G/DL (ref 31.5–35.7)
MCV RBC AUTO: 91 FL (ref 79–97)
MONOCYTES # BLD AUTO: 0.6 X10E3/UL (ref 0.1–0.9)
MONOCYTES NFR BLD AUTO: 8 %
MORPHOLOGY BLD-IMP: ABNORMAL
NEUTROPHILS # BLD AUTO: 5.4 X10E3/UL (ref 1.4–7)
NEUTROPHILS NFR BLD AUTO: 74 %
NRBC BLD AUTO-RTO: ABNORMAL %
PLATELET # BLD AUTO: 287 X10E3/UL (ref 150–450)
POTASSIUM SERPL-SCNC: 3.8 MMOL/L (ref 3.5–5.2)
PROT SERPL-MCNC: 5.9 G/DL (ref 6–8.5)
RBC # BLD AUTO: 3.51 X10E6/UL (ref 3.77–5.28)
SODIUM SERPL-SCNC: 140 MMOL/L (ref 134–144)
T4 FREE SERPL-MCNC: 1.97 NG/DL (ref 0.82–1.77)
TSH SERPL DL<=0.005 MIU/L-ACNC: 0.93 UIU/ML (ref 0.45–4.5)
WBC # BLD AUTO: 7.3 X10E3/UL (ref 3.4–10.8)

## 2020-01-06 ENCOUNTER — TELEPHONE (OUTPATIENT)
Dept: CARDIOLOGY | Facility: MEDICAL CENTER | Age: 80
End: 2020-01-06

## 2020-01-06 NOTE — TELEPHONE ENCOUNTER
NARGIS Matos.  You 1 hour ago (2:00 PM)      Patient was supposed to reduce her Amiodarone to 200 mg daily and her Atenolol to 25 mg daily. She can take both at night to help with fatigue.       Discussed further with ADELIA, pt has been taking her Amiodarone at night and feels exhaustion/lack of energy in the morning. ADELIA recommends for pt to start taking Amiodarone in the morning and Atenolol in the evening (if she's not doing so already). Also recommended pt can decrease atenolol dose to half (12.5mg) and see if symptoms alleviate.    Called pt to discuss, read back recommendations and verbalized understanding. Advised pt to wait at least a few days to see if symptoms alleviate, verbalized understanding and appreciative of call.

## 2020-01-06 NOTE — TELEPHONE ENCOUNTER
"Called pt back to discuss. Pt states she takes her Amiodarone dose in the evening and by morning, she feels exhausted with lack of energy. She reports by afternoon, she has her energy back and she's able to go out for her daily 20-minute walk. Pt reports no other significant symptoms like CP or SOB, her BP and HR are \"fine\". Pt continues to take her meds as prescribed however she wants to know what recommendations there are based on this symptom.    To Trudy - please advise. Pt's [facility] scheduled for 1/20/20. Thanks!  "

## 2020-01-06 NOTE — TELEPHONE ENCOUNTER
ADELIA/froy    Pt calling to discuss amiodarone.  She believes that is the med that causes issues, feeling of being overmedicated.  Pt feels totally exhausted.  Please call Alondra cell# [phone].

## 2020-01-07 ENCOUNTER — NON-PROVIDER VISIT (OUTPATIENT)
Dept: CARDIOLOGY | Facility: MEDICAL CENTER | Age: 80
End: 2020-01-07
Payer: MEDICARE

## 2020-01-07 DIAGNOSIS — Z95.5 STENTED CORONARY ARTERY: Primary | ICD-10-CM

## 2020-01-07 LAB — EKG IMPRESSION: NORMAL

## 2020-01-07 PROCEDURE — G0423 INTENS CARDIAC REHAB NO EXER: HCPCS | Mod: 59 | Performed by: INTERNAL MEDICINE

## 2020-01-07 PROCEDURE — G0422 INTENS CARDIAC REHAB W/EXERC: HCPCS | Performed by: INTERNAL MEDICINE

## 2020-01-07 ASSESSMENT — PATIENT HEALTH QUESTIONNAIRE - PHQ9
1. LITTLE INTEREST OR PLEASURE IN DOING THINGS: 1
4. FEELING TIRED OR HAVING LITTLE ENERGY: 2
8. MOVING OR SPEAKING SO SLOWLY THAT OTHER PEOPLE COULD HAVE NOTICED. OR THE OPPOSITE, BEING SO FIGETY OR RESTLESS THAT YOU HAVE BEEN MOVING AROUND A LOT MORE THAN USUAL: 0
9. THOUGHTS THAT YOU WOULD BE BETTER OFF DEAD, OR OF HURTING YOURSELF: 0
SUM OF ALL RESPONSES TO PHQ9 QUESTIONS 1 AND 2: 2
SUM OF ALL RESPONSES TO PHQ QUESTIONS 1-9: 10
SUM OF ALL RESPONSES TO PHQ QUESTIONS 1-9: 10
3. TROUBLE FALLING OR STAYING ASLEEP OR SLEEPING TOO MUCH: 3
5. POOR APPETITE OR OVEREATING: 2
2. FEELING DOWN, DEPRESSED, IRRITABLE, OR HOPELESS: 1
7. TROUBLE CONCENTRATING ON THINGS, SUCH AS READING THE NEWSPAPER OR WATCHING TELEVISION: 1
6. FEELING BAD ABOUT YOURSELF - OR THAT YOU ARE A FAILURE OR HAVE LET YOURSELF OR YOUR FAMILY DOWN: 0

## 2020-01-07 NOTE — PROGRESS NOTES
Individualized Treatment Plan   Cardiac Rehab Individual Treatment Plan  Initial/Reassessment/Discharge Assessment/ ReAssessment/ Discharge: Initial 20 Session #     1   MRN: [id number] Allergies: Sulfa drugs   Patient Name: Elvie Otero : 1940 Risk Stratification: High    Diagnoses:   1. Stented coronary artery     Age: 79 y.o. Physician: Jaime Merino M.D.    Date of Event: 19 Specialist: Leigh Ann   Risk Factors:  Hypertension, Hyperlipidemia, Family History, Stress, Sedentary Lifestyle, Age, Female > 55   Exercise Nutrition Other Core Components/ Risk Factors Psychosocial   Stages of change Stages of change Stages of change Stages of change   Preparation Preparation Preparation Preparation   Fitness Test Lipids Learning Barriers Psychosocial Plan   DASI: (na) Available: Yes Learning Barriers: None Psychosocial Plan: Yes   DIST: 755 Date: 19 Family Support Goals   Max HR: 72 Total: 120 mg/dL Yes Assess presence or absence of depression using a valid screening: Yes   Max BP: Peak Ex BP: 120/70 Tri mg/dL Lives: Other(son) Use Stress Management: Yes   RPE: 13 HDL: 48 mg/dL Other Risk Factors Plan Adverse Events: Yes   SPO2: 95 %       MET: 2.08 LDL: 59 mg/dL Other Risk Factors/Plan: Yes Unexpected Events: Yes   EF= 60(2019) Diabetes Tobacco Use Intervention   Ambulatory Status Diabetes: No Social History     Tobacco Use   Smoking Status Never Smoker   Smokeless Tobacco Never Used    Behavioral Health Consult: Yes   Fall Risk Assessed: Yes HbA1C: 5 % Date: 19 Goals Physician Referral: No   Exercise Ambulatory Status Assist Devices: None Monitors BS at Home: No Educate / Review and have understanding of cardiac disease prevention: Identifies Stressors: Yes   Exercise Plan Frequency: na Random BS: 101(2019)  Drug Intervention: No     Weight Management  Outcome Survey Tools   Goals Weight: 70.6 kg (155 lb 9.6 oz) Educate / Review and have understanding of cardiac  "disease prevention: Yes FP QOL Overall Score: 23.02   Home Exercise: Yes Height: 157.5 cm (5' 2.01\") Medication Compliance: Yes PHQ-9: 10   Mode: Walk BMI (Calculated): 28.45 Complete Tobacco Cessation: Education   Duration: 10-20 minutes.  Goal weight: 150 Complete Tobacco Cessation: N/A MBSR Lectures/Videos: Yes   Frequency: 7 days active  Waist: 37.5 inch Intervention Psychosocial Education: Coping Techniques, Positive Support System, S/S Depression, MBSR Lectures   Intervention Social History     Substance and Sexual Activity   Alcohol Use Yes    Comment: 1 per week    Smoking Cessation Referral: N/A     Intervention: Yes Nutrition Plan Ind. Education / Counseling: N/A    Exercise Prescription Nutrition Plan: Yes Tobacco Adjunct: N/A    Mode: Treadmill, NuStep, UBE, Airdyne Nutrition Related Target Goals Healthy Heart Education: Class Schedule Given, Cooking School Attended, Medications Reviewed, Patient Education Binder Provided, Risk Factors Discussed, Videos Viewed per Alejandra    Frequency: 3 days/week LDL-C <100 if trig. > 200: N/A Weekly Lectures/ Videos/ Interactive Cooking School: Yes    Duration: 15-25 min LDL-C < 70 for high risk patient:  LDL-C<70 for high risk patients: Yes Education    Intensity: 11-13 RPE  Healthy Heart Education: Class Schedule Given, Cooking School Attended, Medications Reviewed, Patient Education Binder Provided, Risk Factors Discussed, Videos Viewed per Alejandra    METS: 1.0-3.0 Non HDL-C Should be < 130:  Non HDL-C Should be < 130: Yes Hypertension Management   (Active Problem)    Progression: ^ increments of 1-3 to THR/RPE as tolerated      THR: THR: Other (see comment)(85-95) HbA1C < 7%: Yes Resting BP: 147/99    Angina with Exercise?   Angina with Exercise: No   BMI < 25: No(<26) Hypertension Education      Dietary Goal: >=58 rate your plate, low sodium     Resistance Training? Resistance Training: Yes Rate your Plate: Pre(53) Lectures/ Videos/ Cooking School: Yes  " "  Education Intervention Hypertension Goals    Yes Dietician Consult/Class: Pending Medication Adherence : Yes    Equipment Orientation, Exercise Safety, S/S to Report, RPE Scale, Warm Up / Cool Down, Physically Active Nurse/Patient Discussion: Yes Home Self Monitoring : Yes    Exercise “Target Goals” Diabetes Ed Referral: N/A     Start Individual Exercise Rx Yes Discuss Maintenance /Wt Loss: Yes       Attend Cooking School: Pending     BP < 140/90 or < 130/80 if DM or CKD Yes Education       Nutrition Education: Healthy Plant Based Eating, Sodium Reduction Cooking/ Lectures/ Cooking School/  RD 1:1     Aerobic activity 30 + min / day 5 days / wk Yes        Initial Assessment  Heart Sounds: S1S2 WNL          Lung Sounds: Clear throughout bilaterally.          Edema: None present.      Right Peripheral Pulses:  Carotid: 2+  Radial: 2+  Dorsalis Pedis: 2+  Posterior Tibialis: 2+      Left Peripheral Pulses:  Carotid: 2+  Radial: 2+  Dorsalis Pedis: 2+  Posterior Tibialis: 2+       Incision: None present.       Color: WNL    Frame Size: Medium       Cognitive Assessment: A&O X 4, no cognitive deficits reported.     Fall Assessment:    Gait: Steady  Balance: Unsteady due to fogginess.   Upper Body: Right shoulder injury.   Lower Body: No pain or limitations with ROM   Recent Falls: None reported.      Current Medications and Vaccinations: Reviewed     Patient Stated Goals: \"I want to gain some energy back and get back to work.\"            Exercise    Current : Walking 10 minutes, 6 days a week.   Goal: To increase energy and strength and get back to working.   Progress: Willing to start ICR and feels ready.      Nutrition     Current: Does not have much of an appetite, eats 2 meals a day and sometimes drinks nutrition shakes.   Goal: To eat more frequent smaller meals and know what to buy for a heart healthy diet.   Progress: Willing to make dietary changes as recommended.      Hypertension     Current: Blood pressure " WNL today.   Goal: Low sodium diet, medication adherence, home monitoring.   Progress: Willing to meet the above goals.      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  She states that she wants to feel more energy and struggles with fatigue with her medications.  She is working with her physicians.    Goal: To increase her energy and get back to work.   Progress: Willing to attend healthy mindset lectures and show up to [facility].      Other     Notes: No monitoring at this time.  Right shoulder  limitations.

## 2020-01-07 NOTE — PROGRESS NOTES
Intensive cardiac rehabilitation (ICR) individual treatment plan (ITP) reviewed and signed.    Zainab Johnson MD, [facility]  Cardiologist  [facility]

## 2020-01-08 NOTE — TELEPHONE ENCOUNTER
Called pt this morning. She states she started taking Amiodarone in the morning and started taking half of her atenolol dose (12.5mg) in the evening. She reports no improvement, this morning she continues to feel fatigue and weak. She went to a cardio workout class yesterday afternoon, her BP was checked and she reports it was normal. She is seeking advice for medication recommendations.    To Trudy - please advise. Thank you!

## 2020-01-08 NOTE — TELEPHONE ENCOUNTER
NARGIS Matos.  You 1 hour ago (8:55 AM)      Stop amiodarone and see if there is any improvement.         Called pt to discuss, verbalized understanding. Advised to call if symptoms don't improve.

## 2020-01-08 NOTE — TELEPHONE ENCOUNTER
JS      Patient following up with another call after yesterday. She is asking for a call back at [phone].

## 2020-01-10 ENCOUNTER — NON-PROVIDER VISIT (OUTPATIENT)
Dept: CARDIOLOGY | Facility: MEDICAL CENTER | Age: 80
End: 2020-01-10
Payer: MEDICARE

## 2020-01-10 DIAGNOSIS — Z95.5 STENTED CORONARY ARTERY: ICD-10-CM

## 2020-01-10 LAB — EKG IMPRESSION: NORMAL

## 2020-01-10 PROCEDURE — G0423 INTENS CARDIAC REHAB NO EXER: HCPCS | Mod: 59 | Performed by: INTERNAL MEDICINE

## 2020-01-10 PROCEDURE — G0422 INTENS CARDIAC REHAB W/EXERC: HCPCS | Performed by: INTERNAL MEDICINE

## 2020-01-13 ENCOUNTER — NON-PROVIDER VISIT (OUTPATIENT)
Dept: CARDIOLOGY | Facility: MEDICAL CENTER | Age: 80
End: 2020-01-13
Payer: MEDICARE

## 2020-01-13 DIAGNOSIS — Z95.5 STENTED CORONARY ARTERY: ICD-10-CM

## 2020-01-13 LAB — EKG IMPRESSION: NORMAL

## 2020-01-13 PROCEDURE — G0422 INTENS CARDIAC REHAB W/EXERC: HCPCS | Performed by: INTERNAL MEDICINE

## 2020-01-13 PROCEDURE — G0423 INTENS CARDIAC REHAB NO EXER: HCPCS | Mod: 59 | Performed by: INTERNAL MEDICINE

## 2020-01-13 NOTE — PROGRESS NOTES
Elvie Otero attended:Healthy Mindset Workshop from 2:30 pm -3:30pm  The topic was: Goal Setting  Patient received handouts regarding the specific information

## 2020-01-15 ENCOUNTER — NON-PROVIDER VISIT (OUTPATIENT)
Dept: CARDIOLOGY | Facility: MEDICAL CENTER | Age: 80
End: 2020-01-15
Payer: MEDICARE

## 2020-01-15 DIAGNOSIS — Z95.5 STENTED CORONARY ARTERY: ICD-10-CM

## 2020-01-15 LAB — EKG IMPRESSION: NORMAL

## 2020-01-15 PROCEDURE — G0422 INTENS CARDIAC REHAB W/EXERC: HCPCS | Performed by: INTERNAL MEDICINE

## 2020-01-15 PROCEDURE — G0423 INTENS CARDIAC REHAB NO EXER: HCPCS | Mod: 59 | Performed by: INTERNAL MEDICINE

## 2020-01-15 NOTE — PROGRESS NOTES
Elvie Otero attended: cooking school from [id number]  Today  prepared Vegetable Stir Le.    Patient received handouts and nutrition information regarding the specific recipes.

## 2020-01-17 ENCOUNTER — NON-PROVIDER VISIT (OUTPATIENT)
Dept: CARDIOLOGY | Facility: MEDICAL CENTER | Age: 80
End: 2020-01-17
Payer: MEDICARE

## 2020-01-17 DIAGNOSIS — Z95.5 STENTED CORONARY ARTERY: ICD-10-CM

## 2020-01-17 LAB — EKG IMPRESSION: NORMAL

## 2020-01-17 PROCEDURE — G0422 INTENS CARDIAC REHAB W/EXERC: HCPCS | Performed by: INTERNAL MEDICINE

## 2020-01-17 PROCEDURE — G0423 INTENS CARDIAC REHAB NO EXER: HCPCS | Mod: 59 | Performed by: INTERNAL MEDICINE

## 2020-01-17 NOTE — TELEPHONE ENCOUNTER
Called pt back. She states her symptoms have improved since stopping Amiodarone. She is continuing Atenolol 12.5mg every evening at this time. Confirmed her 1/20/20 FV with JS. Can discuss further recommendations at her appointment since pt is no longer symptomatic. Verbalized understanding and appreciative of call back.

## 2020-01-20 ENCOUNTER — OFFICE VISIT (OUTPATIENT)
Dept: CARDIOLOGY | Facility: MEDICAL CENTER | Age: 80
End: 2020-01-20
Payer: MEDICARE

## 2020-01-20 VITALS
SYSTOLIC BLOOD PRESSURE: 90 MMHG | HEIGHT: 62 IN | BODY MASS INDEX: 27.97 KG/M2 | DIASTOLIC BLOOD PRESSURE: 56 MMHG | WEIGHT: 152 LBS | OXYGEN SATURATION: 96 % | HEART RATE: 82 BPM

## 2020-01-20 DIAGNOSIS — Z95.5 S/P RIGHT CORONARY ARTERY (RCA) STENT PLACEMENT: ICD-10-CM

## 2020-01-20 DIAGNOSIS — D50.8 OTHER IRON DEFICIENCY ANEMIA: ICD-10-CM

## 2020-01-20 DIAGNOSIS — I95.89 HYPOTENSION DUE TO HYPOVOLEMIA: ICD-10-CM

## 2020-01-20 DIAGNOSIS — I71.010 ASCENDING AORTIC DISSECTION (HCC): ICD-10-CM

## 2020-01-20 DIAGNOSIS — I48.91 ATRIAL FIBRILLATION WITH RVR (HCC): ICD-10-CM

## 2020-01-20 DIAGNOSIS — E86.1 HYPOTENSION DUE TO HYPOVOLEMIA: ICD-10-CM

## 2020-01-20 DIAGNOSIS — I25.10 CORONARY ARTERY DISEASE INVOLVING NATIVE CORONARY ARTERY OF NATIVE HEART WITHOUT ANGINA PECTORIS: ICD-10-CM

## 2020-01-20 PROCEDURE — 99214 OFFICE O/P EST MOD 30 MIN: CPT | Performed by: NURSE PRACTITIONER

## 2020-01-20 RX ORDER — OMEPRAZOLE 20 MG/1
20 CAPSULE, DELAYED RELEASE ORAL PRN
COMMUNITY
End: 2020-10-20

## 2020-01-20 RX ORDER — ATENOLOL 25 MG/1
12.5 TABLET ORAL EVERY EVENING
Qty: 90 TAB | Refills: 3 | Status: SHIPPED | OUTPATIENT
Start: 2020-01-20 | End: 2020-07-16 | Stop reason: SDUPTHER

## 2020-01-20 NOTE — PROGRESS NOTES
Chief Complaint   Patient presents with   • Coronary Artery Disease     follow up       Subjective:   Alondra Otero is a 79 y.o. female who presents today with her two sons for hospital follow up.     Patient was last seen by myself on 12/27/19 for hospital follow up after being admitted on 12/7/19 for acute infero-posterior STEMI. Patient underwent emergent partial revascularization due to procedural aortic dissection.  On arrival to the cardiac Cath Lab patient was also noted to be in second-degree AV block and a temporary pacemaker was placed patient also had atrial fibrillation with RVR and anemia post procedure.    During that visit patients primary complaint was fatigue. Her Amiodarone was decreased to 200 mg daily and her atenolol was decreased to 25 mg every evening. After her visit patient called the office with ongoing complaints of fatigue. Lab work was ordered and her Amiodarone was discontinued.     Past medical history significant for hypertension.    Today patient states that she is feeling much better since she stopped taking the Amiodarone. She is feeling fatigued today, however her BP is low at 90/56. She has started intensive cardiac rehab and is enjoying it.     She denies palpitations, edema, SOB, chest pain, dizziness or syncope.     Past Medical History:   Diagnosis Date   • Heart burn    • Hypertension      Past Surgical History:   Procedure Laterality Date   • RHYTIDECTOMY  10/15/2012    Performed by Nelsy Sun M.D. at [facility]   • BLEPHAROPLASTY  10/15/2012    Performed by Nelsy Sun M.D. at [facility]   • CHOLECYSTECTOMY  2004   • HYSTERECTOMY LAPAROSCOPY  1975     No family history on file.  Social History     Socioeconomic History   • Marital status:      Spouse name: Not on file   • Number of children: Not on file   • Years of education: Not on file   • Highest education level: Not on file   Occupational History   • Not on  file   Social Needs   • Financial resource strain: Not on file   • Food insecurity:     Worry: Not on file     Inability: Not on file   • Transportation needs:     Medical: Not on file     Non-medical: Not on file   Tobacco Use   • Smoking status: Never Smoker   • Smokeless tobacco: Never Used   Substance and Sexual Activity   • Alcohol use: Yes     Comment: 1 per week   • Drug use: No   • Sexual activity: Not on file   Lifestyle   • Physical activity:     Days per week: Not on file     Minutes per session: Not on file   • Stress: Not on file   Relationships   • Social connections:     Talks on phone: Not on file     Gets together: Not on file     Attends Evangelical service: Not on file     Active member of club or organization: Not on file     Attends meetings of clubs or organizations: Not on file     Relationship status: Not on file   • Intimate partner violence:     Fear of current or ex partner: Not on file     Emotionally abused: Not on file     Physically abused: Not on file     Forced sexual activity: Not on file   Other Topics Concern   • Not on file   Social History Narrative   • Not on file     Allergies   Allergen Reactions   • Sulfa Drugs Vomiting     Outpatient Encounter Medications as of 1/20/2020   Medication Sig Dispense Refill   • omeprazole (PRILOSEC) 20 MG delayed-release capsule Take 20 mg by mouth every day.     • atenolol (TENORMIN) 25 MG Tab Take 0.5 Tabs by mouth every evening. 90 Tab 3   • fexofenadine (ALLEGRA) 180 MG tablet Take 180 mg by mouth as needed.     • Melatonin 5 MG Tab Take 5 mg by mouth 1 time daily as needed.     • acetaminophen (TYLENOL) 500 MG Tab Take 500-1,000 mg by mouth as needed.     • atorvastatin (LIPITOR) 40 MG Tab Take 1 Tab by mouth every evening. 90 Tab 3   • apixaban (ELIQUIS) 5mg Tab Take 1 Tab by mouth 2 Times a Day. 180 Tab 3   • clopidogrel (PLAVIX) 75 MG Tab Take 1 Tab by mouth every day. 90 Tab 3   • [DISCONTINUED] Esomeprazole Magnesium 20 MG Pack Take 20  "mg by mouth every day.     • [DISCONTINUED] amiodarone (CORDARONE) 200 MG Tab Take 1 Tab by mouth every evening. (Patient not taking: Reported on 1/20/2020) 90 Tab 3   • [DISCONTINUED] atenolol (TENORMIN) 25 MG Tab Take 1 Tab by mouth every evening. 90 Tab 3   • [DISCONTINUED] aspirin (ASA) 81 MG Chew Tab chewable tablet Take 1 Tab by mouth every day. (Patient not taking: Reported on 1/20/2020) 100 Tab 3     No facility-administered encounter medications on file as of 1/20/2020.      Review of Systems   Constitutional: Positive for malaise/fatigue. Negative for weight loss.   Respiratory: Negative for shortness of breath.    Cardiovascular: Negative for chest pain, palpitations, orthopnea, claudication, leg swelling and PND.   Neurological: Negative for dizziness and weakness.   All other systems reviewed and are negative.       Objective:   BP (!) 90/56 (BP Location: Left arm, Patient Position: Sitting)   Pulse 82   Ht 1.575 m (5' 2\")   Wt 68.9 kg (152 lb)   SpO2 96%   BMI 27.80 kg/m²     Physical Exam   Constitutional: She is oriented to person, place, and time. She appears well-developed and well-nourished. No distress.   HENT:   Head: Normocephalic.   Eyes: EOM are normal.   Neck: No JVD present.   Cardiovascular: Normal rate, regular rhythm and normal heart sounds.   Pulmonary/Chest: Breath sounds normal. No respiratory distress.   Abdominal: Soft. There is no tenderness.   Musculoskeletal:         General: No edema.   Neurological: She is alert and oriented to person, place, and time.   Skin: Skin is warm and dry.   Psychiatric: She has a normal mood and affect. Her behavior is normal.        Thoracoabdominal CT-CTA 12/17/19:  1.  Intramural hematoma of the ascending aorta has decreased in size and density. It extends approximately MCFP through the ascending aorta. No dissection flap. No aortic aneurysm.  2.  No acute abnormality in the chest, abdomen or pelvis.  3.  Stable 2.2 cm left thyroid nodule " can be further evaluated with outpatient ultrasound.  4.  Colonic diverticulosis.     Echocardiogram 12/11/19:  CONCLUSIONS  No prior study is available for comparison.   Technically difficult study.   Normal left ventricular chamber size.  Inferior wall hypokinesis.  Left ventricular systolic function is normal.  Left ventricular ejection fraction is visually estimated to be 55-60%.  Normal left ventricular wall thickness.  Normal pericardium without effusion.    Cardiac Catheterization 12/11/19:  Coronary artery disease     This is right dominant system.     Left main is large and without flow limiting disease.  It bifurcated into left anterior descending and left circumflex artery.      Left anterior descending artery is large caliber vessel.  It gives rises to several small  diagonal branches. Limited images was obtained but there is no signficant disease in the left anterior descending artery or its major branches seen.  The antegrade flow is normal.     Left circumflex artery is large in caliber. Limited image of the LCX was also obtained.  It gives rise to several obtuse marginal branches.  There is no significant disease in the LCX system.  The antegrade flow is normal.     Right coronary artery (RCA) is large caliber.   At debating beginning the case, the RCA was occluded distally at the crux after giving rise to several small acute marginal branches. There was also large amount of thrombus causing subtotal occlusion at the ostium of the posterior descending artery and the posterolateral branch was not visualized.     After intervention, there was no significant residual stenosis in the distal RCA or the posterolateral branch with residual thrombus at the ostium of the PDA with LAEX II flow.      Post-operative Diagnosis:   1.  Acute inferior ST elevation microinfarction secondary to total occlusion of distal right coronary artery  2.  Second-degree AV block Mobitz type I  3.  Hypokinesis of the basal to  "mid inferior wall with preserved overall LV systolic function  4.  Status post stenting of the ostium and distal right coronary artery with 4 x 23 mm bare-metal stent and 2.25 x 18 mm [place] drug eluting stent respectively     Assessment:     1. Coronary artery disease involving native coronary artery of native heart without angina pectoris  Basic Metabolic Panel   2. S/P right coronary artery (RCA) stent placement     3. Ascending aortic dissection (HCC)     4. Atrial fibrillation with RVR (HCC)     5. Hypotension due to hypovolemia     6. Other iron deficiency anemia         Medical Decision Making:  Today's Assessment / Status / Plan:     CAD S/P Stemi:  -S/P revascularization of the RCA (NILSA to distal RCA, BMS to ostial RCA and PTCA of PDA) on 12/11/2019 which was complicated by a contained a sending aortic dissection.   -Per cath report \"no significant residual stenosis of the distal RCA or the posterolateral branch with residual thrombus at the ostium of the PDA with ALEX II flow\".   -TTE showed preserved LVEF with inferior wall hypokinesis.   -Denies angina or ELLSWORTH.   -Continue Plavix 75 mg daily and atorvastatin 40 mg daily.  -BP low in office today, decrease atnolol to 12.5 mg daily.   -No ASA due to chronic OAC.     Ascending aortic dissection:  -Cardiothoracic surgery was consulted while patient was hospitalized, surgical intervention was not felt to be necessary and recommended observation.  -Repeat CTA of aorta/chest/pelvis completed prior to discharge showed decrease in size and density.     PAF:  -Occurred port STEMI, reverted with IV amio.   -Rate remains regular upon auscultation today.  -PO amiodarone d/c'd due to patient intolerance.   -OAC with Eliquis 5 mg twice daily, tolerating well.    Hypotension:  -Patient does not have a BP cuff at home, she was asked to obtain one.  -Atenolol decreased as above.   -Encouraged patient to increase her daily water intake.     Anemia:  -Repeat CBC showed " significant improvement of her anemia.      Patient will follow-up with Dr. Armstrong as scheduled below or earlier if needed encouraged patient to contact office should any questions or concerns arise in the meantime.And  are as scheduled below.  Patient understands and agrees with plan of care.    Future Appointments   Date Time Provider Department Center   1/22/2020  2:30 PM ICR RN [facility]   1/24/2020  2:30 PM ICR RN [facility]   1/27/2020  2:30 PM ICR RN [facility]   1/29/2020  2:30 PM ICR RN [facility]   1/31/2020  2:30 PM ICR RN [facility]   1/31/2020  3:30 PM ICR RD [facility]   2/3/2020  2:30 PM ICR RN [facility]   2/5/2020  2:30 PM ICR RN [facility]   2/7/2020  2:30 PM ICR RN [facility]   2/10/2020  2:30 PM ICR RN [facility]   2/12/2020  2:30 PM ICR RN [facility]   2/14/2020  2:30 PM ICR RN [facility]   2/17/2020  2:30 PM ICR RN [facility]   2/19/2020  2:30 PM ICR RN [facility]   2/21/2020  2:30 PM ICR RN [facility]   2/24/2020  2:30 PM ICR RN [facility]   2/26/2020  2:30 PM ICR RN [facility]   2/28/2020 10:00 AM Chelsea Armstrong M.D. [facility] None   2/28/2020  2:30 PM ICR RN [facility]   3/2/2020  2:30 PM ICR RN [facility]   3/4/2020  2:30 PM ICR RN [facility]   3/6/2020  2:30 PM ICR RN [facility]   3/9/2020  2:30 PM ICR RN [facility]   3/11/2020  2:30 PM ICR RN [facility]   3/13/2020  2:30 PM ICR RN [facility]   3/16/2020  2:30 PM ICR RN [facility]   3/18/2020  2:30 PM ICR RN [facility]   3/20/2020  2:30 PM ICR RN [facility]   3/23/2020  2:30 PM ICR RN [facility]   3/25/2020  2:30 PM ICR RN [facility]   3/27/2020  2:30 PM ICR RN [facility]   3/30/2020  2:30 PM ICR RN [facility]         Collaborating Provider: Dr. Chelsea Armstrong        Please note that this dictation was created using voice recognition software. I have made every reasonable attempt to correct obvious  errors, but I expect that there are errors of grammar and possibly content I did not discover before finalizing the note.

## 2020-01-21 ENCOUNTER — TELEPHONE (OUTPATIENT)
Dept: CARDIOLOGY | Facility: MEDICAL CENTER | Age: 80
End: 2020-01-21

## 2020-01-21 PROBLEM — I95.89 HYPOTENSION DUE TO HYPOVOLEMIA: Status: ACTIVE | Noted: 2020-01-21

## 2020-01-21 PROBLEM — I25.10 CORONARY ARTERY DISEASE INVOLVING NATIVE CORONARY ARTERY OF NATIVE HEART WITHOUT ANGINA PECTORIS: Status: ACTIVE | Noted: 2020-01-21

## 2020-01-21 PROBLEM — Z95.5 S/P RIGHT CORONARY ARTERY (RCA) STENT PLACEMENT: Status: ACTIVE | Noted: 2020-01-21

## 2020-01-21 PROBLEM — E86.1 HYPOTENSION DUE TO HYPOVOLEMIA: Status: ACTIVE | Noted: 2020-01-21

## 2020-01-21 ASSESSMENT — ENCOUNTER SYMPTOMS
PALPITATIONS: 0
WEIGHT LOSS: 0
SHORTNESS OF BREATH: 0
CLAUDICATION: 0
DIZZINESS: 0
WEAKNESS: 0
PND: 0
ORTHOPNEA: 0

## 2020-01-21 NOTE — TELEPHONE ENCOUNTER
Called pt to discuss. She was planning a trip to [place] that she wants to cancel. Pt is requesting an excuse letter to provide for the airlines so she can cancel her trip.    To Trudy - ok to write letter for pt? Thanks!

## 2020-01-21 NOTE — TELEPHONE ENCOUNTER
S/froy    This is a message that was on the answering service report this morning.  Pt called 1/21 at 6:25am.    Pt states she wants a letter for the airlines that it is not good for her to travel at this time. Pt further states you can email  her at [email or web address].  Pt left ph# is: .

## 2020-01-21 NOTE — LETTER
January 23, 2020    Patient: Elvie Otero   YOB: 1940   Date of Visit: 1/20/2020       To Whom It May Concern:      Elvie Otero was seen and evaluated in our department on 1/20/2020. It is my medical opinion that she should not take an extended trip via airplane at this time.    If you have any questions, please give us a call at [phone].      Sincerely,           DOMENICA Nuñez

## 2020-01-22 ENCOUNTER — NON-PROVIDER VISIT (OUTPATIENT)
Dept: CARDIOLOGY | Facility: MEDICAL CENTER | Age: 80
End: 2020-01-22
Payer: MEDICARE

## 2020-01-22 DIAGNOSIS — Z95.5 H/O HEART ARTERY STENT: ICD-10-CM

## 2020-01-22 LAB — EKG IMPRESSION: NORMAL

## 2020-01-22 PROCEDURE — G0423 INTENS CARDIAC REHAB NO EXER: HCPCS | Mod: 59 | Performed by: INTERNAL MEDICINE

## 2020-01-22 PROCEDURE — G0422 INTENS CARDIAC REHAB W/EXERC: HCPCS | Performed by: INTERNAL MEDICINE

## 2020-01-22 NOTE — PROGRESS NOTES
Elvie Otero attended: cooking school from 2:30 to 3:30 pm.  Today  prepared Dressing and Veggies.    Patient received handouts and nutrition information regarding the specific recipes.

## 2020-01-24 ENCOUNTER — NON-PROVIDER VISIT (OUTPATIENT)
Dept: CARDIOLOGY | Facility: MEDICAL CENTER | Age: 80
End: 2020-01-24
Payer: MEDICARE

## 2020-01-24 DIAGNOSIS — Z95.5 STENTED CORONARY ARTERY: ICD-10-CM

## 2020-01-24 LAB — EKG IMPRESSION: NORMAL

## 2020-01-24 PROCEDURE — G0423 INTENS CARDIAC REHAB NO EXER: HCPCS | Mod: 59 | Performed by: INTERNAL MEDICINE

## 2020-01-24 PROCEDURE — G0422 INTENS CARDIAC REHAB W/EXERC: HCPCS | Performed by: INTERNAL MEDICINE

## 2020-01-27 ENCOUNTER — NON-PROVIDER VISIT (OUTPATIENT)
Dept: CARDIOLOGY | Facility: MEDICAL CENTER | Age: 80
End: 2020-01-27
Payer: MEDICARE

## 2020-01-27 DIAGNOSIS — Z95.5 STENTED CORONARY ARTERY: ICD-10-CM

## 2020-01-27 LAB — EKG IMPRESSION: NORMAL

## 2020-01-27 PROCEDURE — G0423 INTENS CARDIAC REHAB NO EXER: HCPCS | Mod: 59 | Performed by: INTERNAL MEDICINE

## 2020-01-27 PROCEDURE — G0422 INTENS CARDIAC REHAB W/EXERC: HCPCS | Performed by: INTERNAL MEDICINE

## 2020-01-27 NOTE — PROGRESS NOTES
Alondra Otero attended Exercise Workshop from 2:30-3:30PM.   Topic was Balance and Yoga.   Patient received handouts on the material covered and all questions were answered.

## 2020-01-28 NOTE — PROGRESS NOTES
Individualized Treatment Plan   Cardiac Rehab Individual Treatment Plan  Initial/Reassessment/Discharge Assessment/ ReAssessment/ Discharge: 30 day 20 Session #     8   MRN: [id number] Allergies: Sulfa drugs   Patient Name: Elvie Otero : 1940 Risk Stratification: High    Diagnoses:   1. Stented coronary artery     Age: 79 y.o. Physician: Jaime Merino M.D.    Date of Event: 19 Specialist: Leigh Ann   Risk Factors:  Hypertension, Hyperlipidemia, Family History, Stress, Sedentary Lifestyle, Age, Female > 55   Exercise Nutrition Other Core Components/ Risk Factors Psychosocial   Stages of change Stages of change Stages of change Stages of change   Preparation Preparation Preparation Preparation   Fitness Test Lipids Learning Barriers Psychosocial Plan   DASI: (na) Available: Yes Learning Barriers: None Psychosocial Plan: Yes   DIST: (assessed pre- and post-program) Date: 19 Family Support Goals   Max HR: (assessed pre- and post-program) Total: 120 mg/dL Yes Assess presence or absence of depression using a valid screening: Yes   Max BP: Peak Ex BP: (assessed pre- and post-program) Tri mg/dL Lives: Other(son) Use Stress Management: Yes   RPE: (assessed pre- and post-program) HDL: 48 mg/dL Other Risk Factors Plan Adverse Events: Yes   SPO2: (assessed pre- and post-program)       MET: (assessed pre- and post-program) LDL: 59 mg/dL Other Risk Factors/Plan: Yes Unexpected Events: Yes   EF= 60(2019) Diabetes Tobacco Use Intervention   Ambulatory Status Diabetes: No Social History     Tobacco Use   Smoking Status Never Smoker   Smokeless Tobacco Never Used    Behavioral Health Consult: Yes   Fall Risk Assessed: Yes HbA1C: 5 % Date: 19 Goals Physician Referral: No   Exercise Ambulatory Status Assist Devices: None Monitors BS at Home: No Educate / Review and have understanding of cardiac disease prevention: Identifies Stressors: Yes   Exercise Plan Frequency: na Random BS:  "101(12/17/2019)  Drug Intervention: No     Weight Management  Outcome Survey Tools   Goals Weight: 68.9 kg (152 lb) Educate / Review and have understanding of cardiac disease prevention: Yes FP QOL Overall Score: (assessed pre- and post-program)   Home Exercise: Yes Height: 157.5 cm (5' 2.01\") Medication Compliance: Yes PHQ-9: (assessed pre- and post-program)   Mode: Walk BMI (Calculated): 27.79 Complete Tobacco Cessation: Education   Duration: 10-20 minute walk daily Goal weight: 150 Complete Tobacco Cessation: N/A MBSR Lectures/Videos: Yes   Frequency: 7 days active  Waist: (assessed pre- and post-program) Intervention Psychosocial Education: Coping Techniques, Positive Support System, S/S Depression, MBSR Lectures   Intervention Social History     Substance and Sexual Activity   Alcohol Use Yes    Comment: 1 per week    Smoking Cessation Referral: N/A     Intervention: Yes Nutrition Plan Ind. Education / Counseling: N/A    Exercise Prescription Nutrition Plan: Yes Tobacco Adjunct: N/A    Mode: Treadmill, NuStep, UBE, Airdyne Nutrition Related Target Goals Healthy Heart Education: Class Schedule Given, Cooking School Attended, Medications Reviewed, Patient Education Binder Provided, Risk Factors Discussed, Videos Viewed per Pritikin, Workshops Attended    Frequency: 3 days/week LDL-C <100 if trig. > 200: N/A Weekly Lectures/ Videos/ Interactive Cooking School: Yes    Duration: 30-45 min LDL-C < 70 for high risk patient:  LDL-C<70 for high risk patients: Yes Education    Intensity: 11-13 RPE  Healthy Heart Education: Class Schedule Given, Cooking School Attended, Medications Reviewed, Patient Education Binder Provided, Risk Factors Discussed, Videos Viewed per Pritikin, Workshops Attended    METS: 1.0-3.0 Non HDL-C Should be < 130:  Non HDL-C Should be < 130: Yes Hypertension Management   (Active Problem)    Progression: ^ increments of 1-3 to THR/RPE as tolerated      THR: THR: Other (see comment)(85-95) HbA1C < " "7%: Yes Resting BP: 115/74    Angina with Exercise?   Angina with Exercise: No   BMI < 25: No(<26) Hypertension Education      Dietary Goal: >=58 rate your plate, low sodium     Resistance Training? Resistance Training: Yes Rate your Plate: Pre(53) Lectures/ Videos/ Cooking School: Yes    Education Intervention Hypertension Goals    Yes Dietician Consult/Class: Pending(1/31/2020) Medication Adherence : Yes    Equipment Orientation, Exercise Safety, S/S to Report, RPE Scale, Warm Up / Cool Down, Physically Active Nurse/Patient Discussion: Yes Home Self Monitoring : Yes    Exercise “Target Goals” Diabetes Ed Referral: N/A     Start Individual Exercise Rx Yes Discuss Maintenance /Wt Loss: Yes       Attend Cooking School: Yes     BP < 140/90 or < 130/80 if DM or CKD Yes Education       Nutrition Education: Healthy Plant Based Eating, Sodium Reduction Cooking/ Lectures/ Cooking School/  RD 1:1     Aerobic activity 30 + min / day 5 days / wk Yes           Exercise    Current : 20 minutes on the NuStep and 12 minutes on the treadmill and increased to a modified Intermediate wieght routine today. Alondra states,\"I feel so good after exercise. I don't feel sore, I feel energized.\" Walking 10 minutes, 6 days a week at home.   Goal: To increase energy and strength and get back to working.   Progress: Alondra is increasing her duration on non-weight bearing and weight-bearing cardio machines. She was advanced to a modified Intermediate weight routine.      Nutrition     Current: Does not have much of an appetite, eats 2 meals a day and sometimes drinks nutrition shakes. She is trying to select healthier options when grocery shopping and motivated to learn in Nutrition workshops. She has decreased her consumption of full-fat ice cream bars from daily to once per week. She is meeting with RD next week for one-on-one.    Goal: To eat more frequent smaller meals and know what to buy for a heart healthy diet.   Progress: Willing to make " dietary changes as recommended.      Hypertension     Current: 115/74; Blood pressure WNL today.   Goal: Low sodium diet, medication adherence, home monitoring.   Progress: Willing to meet the above goals.      Stress     Current : She is working with her physicians to make the necessary medication adjustments to minimize side effects.    Goal: To increase her energy and get back to work.   Progress: Attending Healthy Mindset lectures.     Other

## 2020-01-29 ENCOUNTER — NON-PROVIDER VISIT (OUTPATIENT)
Dept: CARDIOLOGY | Facility: MEDICAL CENTER | Age: 80
End: 2020-01-29
Payer: MEDICARE

## 2020-01-29 DIAGNOSIS — Z95.5 STENTED CORONARY ARTERY: ICD-10-CM

## 2020-01-29 LAB — EKG IMPRESSION: NORMAL

## 2020-01-29 PROCEDURE — G0422 INTENS CARDIAC REHAB W/EXERC: HCPCS | Performed by: INTERNAL MEDICINE

## 2020-01-29 PROCEDURE — G0423 INTENS CARDIAC REHAB NO EXER: HCPCS | Mod: 59 | Performed by: INTERNAL MEDICINE

## 2020-01-29 NOTE — PROGRESS NOTES
Elvie Otero attended: cooking school from [id number].  Today  prepared Oatmeal Supreme.    Patient received handouts and nutrition information regarding the specific recipes.

## 2020-02-03 ENCOUNTER — NON-PROVIDER VISIT (OUTPATIENT)
Dept: CARDIOLOGY | Facility: MEDICAL CENTER | Age: 80
End: 2020-02-03
Payer: MEDICARE

## 2020-02-03 DIAGNOSIS — Z95.5 H/O HEART ARTERY STENT: ICD-10-CM

## 2020-02-03 LAB — EKG IMPRESSION: NORMAL

## 2020-02-03 PROCEDURE — G0422 INTENS CARDIAC REHAB W/EXERC: HCPCS | Performed by: INTERNAL MEDICINE

## 2020-02-03 PROCEDURE — G0423 INTENS CARDIAC REHAB NO EXER: HCPCS | Mod: 59 | Performed by: INTERNAL MEDICINE

## 2020-02-03 NOTE — PROGRESS NOTES
Elvie Otero attended the following workshop from [id number].  Workshop Title: Fueling a Healthy Body.      Lecture was attended and patient questions addressed. The patient will continue workshops and nutrition education.

## 2020-02-05 ENCOUNTER — NON-PROVIDER VISIT (OUTPATIENT)
Dept: CARDIOLOGY | Facility: MEDICAL CENTER | Age: 80
End: 2020-02-05
Payer: MEDICARE

## 2020-02-05 DIAGNOSIS — Z95.5 STENTED CORONARY ARTERY: ICD-10-CM

## 2020-02-05 LAB — EKG IMPRESSION: NORMAL

## 2020-02-05 PROCEDURE — G0422 INTENS CARDIAC REHAB W/EXERC: HCPCS | Performed by: INTERNAL MEDICINE

## 2020-02-05 PROCEDURE — G0423 INTENS CARDIAC REHAB NO EXER: HCPCS | Mod: 59 | Performed by: INTERNAL MEDICINE

## 2020-02-05 NOTE — PROGRESS NOTES
Patient attended: cooking school from  2:30PM to 3:30PM  Today  prepared Black bean tacos.    Patient received handouts and nutrition information regarding the specific recipes.

## 2020-02-07 ENCOUNTER — NON-PROVIDER VISIT (OUTPATIENT)
Dept: CARDIOLOGY | Facility: MEDICAL CENTER | Age: 80
End: 2020-02-07
Payer: MEDICARE

## 2020-02-07 DIAGNOSIS — Z95.5 STENTED CORONARY ARTERY: ICD-10-CM

## 2020-02-07 LAB — EKG IMPRESSION: NORMAL

## 2020-02-07 PROCEDURE — G0422 INTENS CARDIAC REHAB W/EXERC: HCPCS | Performed by: INTERNAL MEDICINE

## 2020-02-07 PROCEDURE — G0423 INTENS CARDIAC REHAB NO EXER: HCPCS | Mod: 59 | Performed by: INTERNAL MEDICINE

## 2020-02-10 ENCOUNTER — APPOINTMENT (OUTPATIENT)
Dept: CARDIOLOGY | Facility: MEDICAL CENTER | Age: 80
End: 2020-02-10
Payer: MEDICARE

## 2020-02-12 ENCOUNTER — TELEPHONE (OUTPATIENT)
Dept: CARDIOLOGY | Facility: MEDICAL CENTER | Age: 80
End: 2020-02-12

## 2020-02-12 NOTE — TELEPHONE ENCOUNTER
Received phone call transfer from operators. Pt's son Lonny wanted to know if there's specific instructions for TSA at the airport since pt has a bare-metal stent placed back in December. Pt has a scheduled flight tomorrow. Advised there are no specific guidelines for stents and airport security checks. Pt should present her card and they will be advised by TSA regarding any specific safety protocols. He verbalized understanding and appreciative of information.

## 2020-02-17 ENCOUNTER — APPOINTMENT (OUTPATIENT)
Dept: CARDIOLOGY | Facility: MEDICAL CENTER | Age: 80
End: 2020-02-17
Payer: MEDICARE

## 2020-02-21 ENCOUNTER — NON-PROVIDER VISIT (OUTPATIENT)
Dept: CARDIOLOGY | Facility: MEDICAL CENTER | Age: 80
End: 2020-02-21

## 2020-02-21 NOTE — PROGRESS NOTES
Individualized Treatment Plan   Cardiac Rehab Individual Treatment Plan  Initial/Reassessment/Discharge Assessment/ ReAssessment/ Discharge: 60 day 20 Session #     12   MRN: [id number] Allergies: Sulfa drugs   Patient Name: Elvie Otero : 1940 Risk Stratification: High    Diagnoses: No diagnosis found. Age: 79 y.o. Physician: Jaime Merino M.D.    Date of Event: 19 Specialist: Leigh Ann   Risk Factors:  Hypertension, Hyperlipidemia, Family History, Stress, Sedentary Lifestyle, Age, Female > 55   Exercise Nutrition Other Core Components/ Risk Factors Psychosocial   Stages of change Stages of change Stages of change Stages of change   Preparation Preparation Preparation Preparation   Fitness Test Lipids Learning Barriers Plan   DASI: (na) Available: Yes Learning Barriers: None Psychosocial Plan: Yes   DIST: (assessed pre- and post-program) Date: 19 Family Support Intervention   Max HR: (assessed pre- and post-program) Total: 120 mg/dL Yes Behavioral Health Consult: Yes   RPE: (assessed pre- and post-program) Tri mg/dL Lives: Other(son) Physician Referral: No   SPO2: (assessed pre- and post-program) HDL: 48 mg/dL Other Risk Factors Plan Identifies Stressors: Yes   MET: (assessed pre- and post-program) LDL: 59 mg/dL Other Risk Factors/Plan: Yes Drug Intervention: No   EF= 60(2019) Diabetes Tobacco Use Outcome Survey Tools   Ambulatory Status Diabetes: No Social History     Tobacco Use   Smoking Status Never Smoker   Smokeless Tobacco Never Used    FP QOL Overall Score: (assessed pre- and post-program)   Fall Risk Assessed: Yes HbA1C: 5 % Date: 19 Smoking Intervention PHQ-9: (assessed pre- and post-program)   Exercise Ambulatory Status Assist Devices: None Monitors BS at Home: No Smoking Cessation Referral: N/A     Intervention Frequency: na Random BS: 101(2019) Ind. Education / Counseling: N/A Education   Intervention: Yes    MBSR Lectures/Videos: Yes  "  Exercise Prescription/ Exercise Plan       Mode: Treadmill, NuStep, UBE, Airdyne Weight Management Tobacco Adjunct: N/A Psychosocial Education: Coping Techniques, Positive Support System, S/S Depression, MBSR Lectures   Frequency: 3 days/week Weight: (patient not present today to assess) Target Goal Target Goal   Duration: 30-45 min Height: 157.5 cm (5' 2.01\") Complete Tobacco Cessation: Assess presence or absence of depression using a valid screening: Yes   Intensity: 11-13 RPE   Complete Tobacco Cessation: N/A Use Stress Management: Yes   METS: 1.0-3.0 Goal weight: 150 Intervention Adverse Events: Yes   Progression: ^ increments of 1-3 to THR/RPE as tolerated Waist: (assessed pre- and post-program) Healthy Heart Education: Class Schedule Given, Cooking School Attended, Medications Reviewed, Patient Education Binder Provided, Risk Factors Discussed, Videos Viewed per Alejandra, Workshops Attended Unexpected Events: Yes      Goals     THR: THR: Other (see comment)(85-95) Social History     Substance and Sexual Activity   Alcohol Use Yes    Comment: 1 per week    Educate / Review and have understanding of cardiac disease prevention:    Angina with Exercise? Angina with Exercise: No Nutrition Plan Educate / Review and have understanding of cardiac disease prevention: Yes      Nutrition Plan: Yes Medication Compliance: Yes    Resistance Training? Resistance Training: Yes Intervention Hypertension Intervention      Dietician Consult/Class: Pending(1/31/2020)     Goals Nurse/Patient Discussion: Yes Weekly Lectures/ Videos/ Interactive Cooking School: Yes    Home Exercise: Yes Diabetes Ed Referral: N/A Hypertension Management    Mode: Walk Discuss Maintenance /Wt Loss: Yes Resting BP: (patient not present today to assess)    Duration: 10-20 minute walk daily Attend Cooking School: Yes     Frequency: 7 days active  Dietary Goal: >=58 rate your plate, low sodium    Education Education    Yes Nutrition Education: Healthy " Plant Based Eating, Sodium Reduction Cooking/ Lectures/ Cooking School/  RD 1:1     Equipment Orientation, Exercise Safety, S/S to Report, RPE Scale, Warm Up / Cool Down, Physically Active Target Goal    Target Goal LDL-C < 100 if trig. > 200:  N/A    Start Individual Exercise Rx Yes LDL-C < 70 for high risk patient:  Yes    BP < 140/90 or < 130/80 if DM or CKD Yes Non HDL-C Should be < 130:  Yes    Aerobic activity 30 + min / day 5 days / wk Yes HbA1C < 7%: Yes      BMI < 25: No(<26)           Other     Notes: Alondra is not present today to complete a thorough ITP. She is out until the 24th of February for a family issue. RN will conduct ITP upon her return.

## 2020-02-24 ENCOUNTER — NON-PROVIDER VISIT (OUTPATIENT)
Dept: CARDIOLOGY | Facility: MEDICAL CENTER | Age: 80
End: 2020-02-24
Payer: MEDICARE

## 2020-02-24 DIAGNOSIS — Z95.5 STENTED CORONARY ARTERY: ICD-10-CM

## 2020-02-24 LAB — EKG IMPRESSION: NORMAL

## 2020-02-24 PROCEDURE — G0423 INTENS CARDIAC REHAB NO EXER: HCPCS | Mod: 59 | Performed by: INTERNAL MEDICINE

## 2020-02-24 PROCEDURE — G0422 INTENS CARDIAC REHAB W/EXERC: HCPCS | Performed by: INTERNAL MEDICINE

## 2020-02-24 NOTE — PROGRESS NOTES
Individualized Treatment Plan   Cardiac Rehab Individual Treatment Plan  Initial/Reassessment/Discharge Assessment/ ReAssessment/ Discharge: 60 day 20 Session #     13   MRN: [id number] Allergies: Sulfa drugs   Patient Name: Elvie Otero : 1940 Risk Stratification: High    Diagnoses:   1. Stented coronary artery     Age: 79 y.o. Physician: Jaime Merino M.D.    Date of Event: 19 Specialist: Leigh Ann   Risk Factors:  Hypertension, Hyperlipidemia, Family History, Stress, Sedentary Lifestyle, Age, Female > 55   Exercise Nutrition Other Core Components/ Risk Factors Psychosocial   Stages of change Stages of change Stages of change Stages of change   Preparation Preparation Preparation Preparation   Fitness Test Lipids Learning Barriers Plan   DASI: (na) Available: Yes Learning Barriers: None Psychosocial Plan: Yes   DIST: (assessed pre- and post-program) Date: 19 Family Support Intervention   Max HR: (assessed pre- and post-program) Total: 120 mg/dL Yes Behavioral Health Consult: Yes   RPE: (assessed pre- and post-program) Tri mg/dL Lives: Other(son) Physician Referral: No   SPO2: (assessed pre- and post-program) HDL: 48 mg/dL Other Risk Factors Plan Identifies Stressors: Yes   MET: (assessed pre- and post-program) LDL: 59 mg/dL Other Risk Factors/Plan: Yes Drug Intervention: No   EF= 60(2019) Diabetes Tobacco Use Outcome Survey Tools   Ambulatory Status Diabetes: No Social History     Tobacco Use   Smoking Status Never Smoker   Smokeless Tobacco Never Used    FP QOL Overall Score: (assessed pre- and post-program)   Fall Risk Assessed: Yes HbA1C: 5 % Date: 19 Smoking Intervention PHQ-9: (assessed pre- and post-program)   Exercise Ambulatory Status Assist Devices: None Monitors BS at Home: No Smoking Cessation Referral: N/A     Intervention Frequency: na Random BS: 93(19) Ind. Education / Counseling: N/A Education   Intervention: Yes    MBSR Lectures/Videos: Yes  "  Exercise Prescription/ Exercise Plan       Mode: Treadmill, NuStep, UBE, Airdyne Weight Management Tobacco Adjunct: N/A Psychosocial Education: Coping Techniques, Positive Support System, S/S Depression, MBSR Lectures   Frequency: 3 days/week Weight: 69.7 kg (153 lb 9.6 oz) Target Goal Target Goal   Duration: 30-45 min Height: 157.5 cm (5' 2.01\") Complete Tobacco Cessation: Assess presence or absence of depression using a valid screening: Yes   Intensity: 11-13 RPE BMI (Calculated): 28.09 Complete Tobacco Cessation: N/A Use Stress Management: Yes   METS: 1.0-3.0 Goal weight: 150 Intervention Adverse Events: Yes   Progression: ^ increments of 1-3 to THR/RPE as tolerated Waist: (assessed pre- and post-program) Healthy Heart Education: Class Schedule Given, Cooking School Attended, Medications Reviewed, Patient Education Binder Provided, Risk Factors Discussed, Videos Viewed per Alejandra, Workshops Attended Unexpected Events: Yes      Goals     THR: THR: Other (see comment)(85-95) Social History     Substance and Sexual Activity   Alcohol Use Yes    Comment: 1 per week    Educate / Review and have understanding of cardiac disease prevention:    Angina with Exercise? Angina with Exercise: No Nutrition Plan Educate / Review and have understanding of cardiac disease prevention: Yes      Nutrition Plan: Yes Medication Compliance: Yes    Resistance Training? Resistance Training: Yes Intervention Hypertension Intervention      Dietician Consult/Class: Pending(2/28/2020)     Goals Nurse/Patient Discussion: Yes Weekly Lectures/ Videos/ Interactive Cooking School: Yes    Home Exercise: Yes Diabetes Ed Referral: N/A Hypertension Management    Mode: Walk Discuss Maintenance /Wt Loss: Yes Resting BP: 124/64(patient not present today to assess)    Duration: 10-20 minute walk daily Attend Cooking School: Yes     Frequency: 7 days active  Dietary Goal: >=58 rate your plate, low sodium    Education Education    Yes Nutrition " Education: Healthy Plant Based Eating, Sodium Reduction Cooking/ Lectures/ Cooking School/  RD 1:1     Equipment Orientation, Exercise Safety, S/S to Report, RPE Scale, Warm Up / Cool Down, Physically Active Target Goal    Target Goal LDL-C < 100 if trig. > 200:  N/A    Start Individual Exercise Rx Yes LDL-C < 70 for high risk patient:  Yes    BP < 140/90 or < 130/80 if DM or CKD Yes Non HDL-C Should be < 130:  Yes    Aerobic activity 30 + min / day 5 days / wk Yes HbA1C < 7%: Yes      BMI < 25: No(<26)             Exercise     Current: Current : 20 minutes on the NuStep and 12 minutes on the treadmill and increased to a modified Intermediate wieght routine today. Alondra has been gone for two weeks visiting family in [place] and states she neglected her exercise routine but is motivated to get back to her routine.   Goal: Goal: To continue to increase time on the treadmill. Her goal is 15 minutes starting next session. To investigate the [employer] near her home to see if it is a gym she would like to join. Goal of 5-7 days active.   Progress: Progress: Alondra states she has never been an exerciser but is motivated to change her habbits. RN discussed gym membership and Phase III options to meet her goal of 7 days active upon discharge. Alondra is increasing her duration on non-weight bearing and weight-bearing cardio machines. She was advanced to a modified Intermediate weight routine.      Nutrition     Current: Current: Does not have much of an appetite, eats 2 meals a day and sometimes drinks nutrition shakes. She is trying to select healthier options when grocery shopping and motivated to learn in Nutrition workshops. She has decreased her consumption of full-fat ice cream bars from daily to once per week. She is meeting with RD this Friday, 2/28/2020.  Goal: Goal: To eat more frequent smaller meals and know what to buy for a heart healthy diet.   Progress: Progress: Willing to learn how to make healthier  choices.      Hypertension     Current: Current: 124/64   Goal: Goal: Low sodium diet, medication adherence, home monitoring.   Progress: Progress: Willing to meet the above goals.      Stress     Current: Current : Alondra is feeling well, She is back to work and just returned from a trip to visit family back [facility].   Goal: Goal: To maintain low stress. To attend Healthy Mindset lectures.   Progress: Progress: Attending Healthy Mindset lectures.     Other     Notes: Alondra states that her energy has been good and is not experiencing side effects from her current medication regimen.

## 2020-02-25 LAB
BUN SERPL-MCNC: 11 MG/DL (ref 8–27)
BUN/CREAT SERPL: 12 (ref 12–28)
CALCIUM SERPL-MCNC: 9.3 MG/DL (ref 8.7–10.3)
CHLORIDE SERPL-SCNC: 107 MMOL/L (ref 96–106)
CO2 SERPL-SCNC: 24 MMOL/L (ref 20–29)
CREAT SERPL-MCNC: 0.93 MG/DL (ref 0.57–1)
GLUCOSE SERPL-MCNC: 94 MG/DL (ref 65–99)
POTASSIUM SERPL-SCNC: 3.6 MMOL/L (ref 3.5–5.2)
SODIUM SERPL-SCNC: 146 MMOL/L (ref 134–144)

## 2020-02-25 NOTE — PROGRESS NOTES
Intensive cardiac rehabilitation (ICR) individual treatment plan (ITP) reviewed and signed.    Zainab Johnson MD, [facility]  Cardiologist  [facility]

## 2020-02-26 ENCOUNTER — NON-PROVIDER VISIT (OUTPATIENT)
Dept: CARDIOLOGY | Facility: MEDICAL CENTER | Age: 80
End: 2020-02-26
Payer: MEDICARE

## 2020-02-26 DIAGNOSIS — Z95.5 STENTED CORONARY ARTERY: ICD-10-CM

## 2020-02-26 LAB — EKG IMPRESSION: NORMAL

## 2020-02-26 PROCEDURE — G0423 INTENS CARDIAC REHAB NO EXER: HCPCS | Mod: 59 | Performed by: INTERNAL MEDICINE

## 2020-02-26 PROCEDURE — G0422 INTENS CARDIAC REHAB W/EXERC: HCPCS | Performed by: INTERNAL MEDICINE

## 2020-02-26 NOTE — PROGRESS NOTES
Elvie Otero attended: cooking school from  [id number]  Today  prepared [place] Ezekiel.    Patient received handouts and nutrition information regarding the specific recipes.

## 2020-02-28 ENCOUNTER — OFFICE VISIT (OUTPATIENT)
Dept: CARDIOLOGY | Facility: MEDICAL CENTER | Age: 80
End: 2020-02-28
Payer: MEDICARE

## 2020-02-28 VITALS
DIASTOLIC BLOOD PRESSURE: 64 MMHG | BODY MASS INDEX: 27.6 KG/M2 | SYSTOLIC BLOOD PRESSURE: 118 MMHG | OXYGEN SATURATION: 99 % | WEIGHT: 150 LBS | HEIGHT: 62 IN | HEART RATE: 68 BPM

## 2020-02-28 DIAGNOSIS — I48.0 PAF (PAROXYSMAL ATRIAL FIBRILLATION) (HCC): ICD-10-CM

## 2020-02-28 DIAGNOSIS — Z79.01 CHRONIC ANTICOAGULATION: ICD-10-CM

## 2020-02-28 DIAGNOSIS — Z95.5 S/P RIGHT CORONARY ARTERY (RCA) STENT PLACEMENT: ICD-10-CM

## 2020-02-28 DIAGNOSIS — I71.010 ASCENDING AORTIC DISSECTION (HCC): ICD-10-CM

## 2020-02-28 DIAGNOSIS — I25.2 OLD INFEROPOSTERIOR MYOCARDIAL INFARCTION: ICD-10-CM

## 2020-02-28 PROCEDURE — 99214 OFFICE O/P EST MOD 30 MIN: CPT | Performed by: INTERNAL MEDICINE

## 2020-02-28 ASSESSMENT — ENCOUNTER SYMPTOMS
RESPIRATORY NEGATIVE: 1
WEIGHT LOSS: 0
BLOOD IN STOOL: 0
NERVOUS/ANXIOUS: 0
BRUISES/BLEEDS EASILY: 1
CARDIOVASCULAR NEGATIVE: 1

## 2020-02-28 NOTE — PROGRESS NOTES
Chief Complaint   Patient presents with   • Coronary Artery Disease     follow up   Recent MI  RCA stent complicated by ascending aortic dissection  PAF post MI  Anticoagulation    Subjective:   Alondra Otero is a 79 y.o. female who presents today for f/u above issue    Patient 79 years old with acute inferoposterior wall microinfarction the end of December. Cardiac catheterization showed right coronary artery occlusion but no other significant plaque resting possibility of cardic emboli .    She did have A. fib during admission and appeared not to be unable to perceive it.   Complain of lack of energy and dizziness with amiodarone.    Blood pressure was low with 25 mg atenolol.  The dose was reduced to 12.5 mg daily  Feels much better after d/c amiodarone and lower dose of atenolol  No CP, palpitations or dizziness  Participating in cardiac rehab  C/o cost of Eliquis      Past Medical History:   Diagnosis Date   • Heart burn    • Hypertension      Past Surgical History:   Procedure Laterality Date   • RHYTIDECTOMY  10/15/2012    Performed by Nelsy Sun M.D. at SURGERY [facility]   • BLEPHAROPLASTY  10/15/2012    Performed by Nelsy Sun M.D. at [facility]   • CHOLECYSTECTOMY  2004   • HYSTERECTOMY LAPAROSCOPY  1975     History reviewed. No pertinent family history.  Social History     Socioeconomic History   • Marital status:      Spouse name: Not on file   • Number of children: Not on file   • Years of education: Not on file   • Highest education level: Not on file   Occupational History   • Not on file   Social Needs   • Financial resource strain: Not on file   • Food insecurity     Worry: Not on file     Inability: Not on file   • Transportation needs     Medical: Not on file     Non-medical: Not on file   Tobacco Use   • Smoking status: Never Smoker   • Smokeless tobacco: Never Used   Substance and Sexual Activity   • Alcohol use: Yes     Comment: 1 per week    • Drug use: No   • Sexual activity: Not on file   Lifestyle   • Physical activity     Days per week: Not on file     Minutes per session: Not on file   • Stress: Not on file   Relationships   • Social connections     Talks on phone: Not on file     Gets together: Not on file     Attends Caodaism service: Not on file     Active member of club or organization: Not on file     Attends meetings of clubs or organizations: Not on file     Relationship status: Not on file   • Intimate partner violence     Fear of current or ex partner: Not on file     Emotionally abused: Not on file     Physically abused: Not on file     Forced sexual activity: Not on file   Other Topics Concern   • Not on file   Social History Narrative   • Not on file     Allergies   Allergen Reactions   • Sulfa Drugs Vomiting     Outpatient Encounter Medications as of 2/28/2020   Medication Sig Dispense Refill   • atenolol (TENORMIN) 25 MG Tab Take 0.5 Tabs by mouth every evening. 90 Tab 3   • fexofenadine (ALLEGRA) 180 MG tablet Take 180 mg by mouth as needed.     • Melatonin 5 MG Tab Take 5 mg by mouth 1 time daily as needed.     • acetaminophen (TYLENOL) 500 MG Tab Take 500-1,000 mg by mouth as needed.     • atorvastatin (LIPITOR) 40 MG Tab Take 1 Tab by mouth every evening. 90 Tab 3   • apixaban (ELIQUIS) 5mg Tab Take 1 Tab by mouth 2 Times a Day. 180 Tab 3   • clopidogrel (PLAVIX) 75 MG Tab Take 1 Tab by mouth every day. 90 Tab 3   • omeprazole (PRILOSEC) 20 MG delayed-release capsule Take 20 mg by mouth every day.       No facility-administered encounter medications on file as of 2/28/2020.      Review of Systems   Constitutional: Negative for weight loss.   HENT: Negative for nosebleeds.    Respiratory: Negative.    Cardiovascular: Negative.    Gastrointestinal: Negative for blood in stool.   Genitourinary: Negative for hematuria.   Endo/Heme/Allergies: Bruises/bleeds easily.   Psychiatric/Behavioral: The patient is not nervous/anxious.   "       Objective:   /64 (BP Location: Left arm, Patient Position: Sitting)   Pulse 68   Ht 1.575 m (5' 2\")   Wt 68 kg (150 lb)   SpO2 99%   BMI 27.44 kg/m²     Physical Exam   Constitutional: She is oriented to person, place, and time.   Neck: No JVD present.   Cardiovascular: Normal rate and regular rhythm. Exam reveals no gallop.   No murmur heard.  Pulmonary/Chest: Effort normal and breath sounds normal. No respiratory distress. She has no wheezes. She has no rales.   Abdominal: Soft. She exhibits no distension. There is no abdominal tenderness.   Musculoskeletal:         General: No edema.   Neurological: She is alert and oriented to person, place, and time.   Skin: Skin is warm and dry. No erythema.   Psychiatric: She has a normal mood and affect. Her behavior is normal.     ECHO December 2019 : NL EF    Assessment:     1. 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 Embolic from unrecognized AF   2. S/P right coronary artery (RCA) stent placement     3. PAF (paroxysmal atrial fibrillation) (HCC)      first and only episode during her MI so far   4. Chronic anticoagulation         Medical Decision Making:  Today's Assessment / Status / Plan:     Will check into co-pay for Xarelto.  The patient's cardiovascular conditions are otherwise stable. Will continue other current medications and have the patient return for a followup in 6 months. Will be happy to see the patient sooner as needed. Thank you for allowing me to participate in the caring of this patient.  "

## 2020-03-04 ENCOUNTER — NON-PROVIDER VISIT (OUTPATIENT)
Dept: CARDIOLOGY | Facility: MEDICAL CENTER | Age: 80
End: 2020-03-04
Payer: MEDICARE

## 2020-03-04 DIAGNOSIS — Z95.5 STENTED CORONARY ARTERY: ICD-10-CM

## 2020-03-04 LAB — EKG IMPRESSION: NORMAL

## 2020-03-04 PROCEDURE — G0423 INTENS CARDIAC REHAB NO EXER: HCPCS | Mod: 59 | Performed by: INTERNAL MEDICINE

## 2020-03-04 PROCEDURE — G0422 INTENS CARDIAC REHAB W/EXERC: HCPCS | Performed by: INTERNAL MEDICINE

## 2020-03-04 NOTE — PROGRESS NOTES
Patient attended cooking school from 330-430  Today  prepared Chocolate mousse and brownie bites.    Patient received handouts and nutrition information regarding the specific recipes.

## 2020-03-06 ENCOUNTER — NON-PROVIDER VISIT (OUTPATIENT)
Dept: CARDIOLOGY | Facility: MEDICAL CENTER | Age: 80
End: 2020-03-06
Payer: MEDICARE

## 2020-03-06 DIAGNOSIS — Z95.5 H/O HEART ARTERY STENT: ICD-10-CM

## 2020-03-06 LAB — EKG IMPRESSION: NORMAL

## 2020-03-06 PROCEDURE — G0422 INTENS CARDIAC REHAB W/EXERC: HCPCS | Performed by: INTERNAL MEDICINE

## 2020-03-06 PROCEDURE — G0423 INTENS CARDIAC REHAB NO EXER: HCPCS | Mod: 59 | Performed by: INTERNAL MEDICINE

## 2020-03-09 ENCOUNTER — NON-PROVIDER VISIT (OUTPATIENT)
Dept: CARDIOLOGY | Facility: MEDICAL CENTER | Age: 80
End: 2020-03-09
Payer: MEDICARE

## 2020-03-09 DIAGNOSIS — Z95.5 STENTED CORONARY ARTERY: ICD-10-CM

## 2020-03-09 LAB — EKG IMPRESSION: NORMAL

## 2020-03-09 PROCEDURE — G0423 INTENS CARDIAC REHAB NO EXER: HCPCS | Mod: 59 | Performed by: INTERNAL MEDICINE

## 2020-03-09 PROCEDURE — G0422 INTENS CARDIAC REHAB W/EXERC: HCPCS | Performed by: INTERNAL MEDICINE

## 2020-03-09 NOTE — PROGRESS NOTES
Elvie Otero attended: Healthy Mindset Workshop from 3:30 pm -4:30 pm    The topic was: Stress and your health.    Patient received handouts regarding the specific information

## 2020-03-09 NOTE — PROGRESS NOTES
Individualized Treatment Plan   Cardiac Rehab Individual Treatment Plan  Initial/Reassessment/Discharge Assessment/ ReAssessment/ Discharge: 90 day 20 Session #     17   MRN: [id number] Allergies: Sulfa drugs   Patient Name: Elvie Otero : 1940 Risk Stratification: High    Diagnoses:   1. Stented coronary artery     Age: 79 y.o. Physician: Jaime Merino M.D.    Date of Event: 19 Specialist: Leigh Ann   Risk Factors:  Hypertension, Hyperlipidemia, Family History, Stress, Sedentary Lifestyle, Age, Female > 55   Exercise Nutrition Other Core Components/ Risk Factors Psychosocial   Stages of change Stages of change Stages of change Stages of change   Preparation Preparation Preparation Preparation   Fitness Test Lipids Learning Barriers Plan   DASI: (na) Available: No new Learning Barriers: None Psychosocial Plan: Yes   DIST: (assessed pre- and post-program) Date: 19 Family Support Intervention   Max HR: (assessed pre- and post-program) Total: 120 mg/dL Yes Behavioral Health Consult: Yes   RPE: (assessed pre- and post-program) Tri mg/dL Lives: Other(son) Physician Referral: No   SPO2: (assessed pre- and post-program) HDL: 48 mg/dL Other Risk Factors Plan Identifies Stressors: Yes   MET: (assessed pre- and post-program) LDL: 59 mg/dL Other Risk Factors/Plan: Yes Drug Intervention: No   EF= 60(2019) Diabetes Tobacco Use Outcome Survey Tools   Ambulatory Status Diabetes: No Social History     Tobacco Use   Smoking Status Never Smoker   Smokeless Tobacco Never Used    FP QOL Overall Score: (assessed pre- and post-program)   Fall Risk Assessed: Yes HbA1C: 5 % Date: 19 Smoking Intervention PHQ-9: (assessed pre- and post-program)   Exercise Ambulatory Status Assist Devices: None Monitors BS at Home: No Smoking Cessation Referral: N/A     Intervention Frequency: na Random BS: 94(20) Ind. Education / Counseling: N/A Education   Intervention: Yes    MBSR Lectures/Videos:  "Yes   Exercise Prescription/ Exercise Plan       Mode: Treadmill, NuStep, UBE, Airdyne Weight Management Tobacco Adjunct: N/A Psychosocial Education: Coping Techniques, Positive Support System, S/S Depression, MBSR Lectures   Frequency: 3 days/week   Target Goal Target Goal   Duration: 45-60 min Height: 157.5 cm (5' 2.01\") Complete Tobacco Cessation: Assess presence or absence of depression using a valid screening: Yes   Intensity: 11-13 RPE   Complete Tobacco Cessation: N/A Use Stress Management: Yes   METS: 1.0-3.0 Goal weight: 150 Intervention Adverse Events: Yes   Progression: ^ increments of 1-3 to THR/RPE as tolerated Waist: (assessed pre- and post-program) Healthy Heart Education: Class Schedule Given, Cooking School Attended, Medications Reviewed, Patient Education Binder Provided, Risk Factors Discussed, Videos Viewed per Alejandra, Workshops Attended Unexpected Events: Yes      Goals     THR: THR: Other (see comment)(85-95) Social History     Substance and Sexual Activity   Alcohol Use Yes    Comment: 1 per week    Educate / Review and have understanding of cardiac disease prevention:    Angina with Exercise? Angina with Exercise: No Nutrition Plan Educate / Review and have understanding of cardiac disease prevention: Yes      Nutrition Plan: Yes Medication Compliance: Yes    Resistance Training? Resistance Training: Yes Intervention Hypertension Intervention      Dietician Consult/Class: Pending(3/20/2020)     Goals Nurse/Patient Discussion: Yes Weekly Lectures/ Videos/ Interactive Cooking School: Yes    Home Exercise: Yes Diabetes Ed Referral: N/A Hypertension Management    Mode: Walk Discuss Maintenance /Wt Loss: Yes Resting BP: (patient not present today to assess)    Duration: 10-20 minute walk daily Attend Cooking School: Yes     Frequency: 7 days active  Dietary Goal: >=58 rate your plate, low sodium    Education Education    Yes Nutrition Education: Healthy Plant Based Eating, Sodium Reduction " Cooking/ Lectures/ Cooking School/  RD 1:1     Equipment Orientation, Exercise Safety, S/S to Report, RPE Scale, Warm Up / Cool Down, Physically Active Target Goal    Target Goal LDL-C < 100 if trig. > 200:  N/A    Start Individual Exercise Rx Yes LDL-C < 70 for high risk patient:  Yes    BP < 140/90 or < 130/80 if DM or CKD Yes Non HDL-C Should be < 130:  Yes    Aerobic activity 30 + min / day 5 days / wk Yes HbA1C < 7%: Yes      BMI < 25: No(<26)               Exercise     Current: Current : 20 minutes on treadmill at 2 mph and 20 minutes on NuStep plus weight routine.   Goal: Goal: Exceeding time goal in ICR. Adding band exercises to weight routine today. Alondra plans to join a gym and walk and participate in Phase III after discharge.   Progress: Progress: Alondra states she has never been an exerciser but after her heart event is motivated to change her habits. She states her stamina is improving with the consistent exercise in ICR.      Nutrition     Current: Current: Does not have much of an appetite, eats 2 meals a day and sometimes drinks nutrition shakes. She is trying to select healthier options when grocery shopping and motivated to learn in Nutrition workshops. She has decreased her consumption of full-fat ice cream bars from daily to once per week. She is meeting with RD this Friday, 3/20/2020.  Goal: Goal: To eat more frequent smaller meals and know what to buy for a heart healthy diet.   Progress: Progress: RN provided education on heart disease and Alondra's specific procedures using heart models. All questions answered at this time. She is preparing questions to ask RD.      Hypertension     Current: Current: 117/78; BP WNL today  Goal: Goal: Low sodium diet, medication adherence, home monitoring.   Progress: Progress: Willing to meet the above goals.      Stress     Current: Current : Alondra is feeling well, She is back to work and just returned from a [place].   Goal: Goal: To  maintain low stress. To attend Healthy Mindset lectures.   Progress: Progress: Attending Healthy Mindset lectures.     Other

## 2020-03-10 NOTE — PROGRESS NOTES
Intensive cardiac rehabilitation (ICR) individual treatment plan (ITP) reviewed and signed.    Zainab Johnson MD, [facility]  Cardiologist  [facility]

## 2020-03-11 ENCOUNTER — NON-PROVIDER VISIT (OUTPATIENT)
Dept: CARDIOLOGY | Facility: MEDICAL CENTER | Age: 80
End: 2020-03-11
Payer: MEDICARE

## 2020-03-11 DIAGNOSIS — Z95.5 STENTED CORONARY ARTERY: ICD-10-CM

## 2020-03-11 LAB — EKG IMPRESSION: NORMAL

## 2020-03-11 PROCEDURE — G0422 INTENS CARDIAC REHAB W/EXERC: HCPCS | Performed by: INTERNAL MEDICINE

## 2020-03-11 PROCEDURE — G0423 INTENS CARDIAC REHAB NO EXER: HCPCS | Mod: 59 | Performed by: INTERNAL MEDICINE

## 2020-03-11 NOTE — PROGRESS NOTES
Elvie Otero attended: cooking school from 3:30 to 4:30 pm.  Today  prepared Black Bean Burgers.    Patient received handouts and nutrition information regarding the specific recipes.

## 2020-03-16 ENCOUNTER — NON-PROVIDER VISIT (OUTPATIENT)
Dept: CARDIOLOGY | Facility: MEDICAL CENTER | Age: 80
End: 2020-03-16
Payer: MEDICARE

## 2020-03-16 DIAGNOSIS — Z95.5 STENTED CORONARY ARTERY: ICD-10-CM

## 2020-03-16 LAB — EKG IMPRESSION: NORMAL

## 2020-03-16 PROCEDURE — G0422 INTENS CARDIAC REHAB W/EXERC: HCPCS | Performed by: INTERNAL MEDICINE

## 2020-03-16 PROCEDURE — G0423 INTENS CARDIAC REHAB NO EXER: HCPCS | Mod: 59 | Performed by: INTERNAL MEDICINE

## 2020-03-16 NOTE — PROGRESS NOTES
Elvie Otero attended: attended the following workshop from 2:30pm - 3:30 pm  Workshop Title: Dinning out and Menu Workshop.      Lecture was attended and patient questions addressed. The patient will continue workshops and nutrition education.

## 2020-03-20 ENCOUNTER — TELEPHONE (OUTPATIENT)
Dept: CARDIOLOGY | Facility: MEDICAL CENTER | Age: 80
End: 2020-03-20

## 2020-03-20 NOTE — TELEPHONE ENCOUNTER
"Elvie was called for a follow up on her Cardiac Rehabilitation Maintenance during our [facility] gym closure. She reports that she has bought bands and other gym equipment and is walking outside on fair-weather days to complete her exercise. \"I don't want to lose what I have gained, so I am improvising and making it work.\" She has a [employer] company and states that she is still going in to the office each day. 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 She states she is well stocked with food in the freezer. She states she is doing well and understands how to get help should she become concerned with her health. She was informed that [facility] staff will call once a week, or more often if requested, to update her, encourage her rehab and answer any questions she may have.   "

## 2020-03-29 ENCOUNTER — TELEPHONE (OUTPATIENT)
Dept: CARDIOLOGY | Facility: MEDICAL CENTER | Age: 80
End: 2020-03-29

## 2020-03-29 NOTE — TELEPHONE ENCOUNTER
Elvie (Alondra) was called to reinforce Cardiac Rehabilitation during [facility]'s closure due to COVID-19 crisis. Alondra was out walking when RN called and she states she is keeping up with her 30 minute walks each day. She is obtaining groceries in the store and RN offered grocery delivery services and RD support. Alondra declined resources at this time and feels she is taking appropriate precautionary measures. RN updated her on [facility]'s tentative re-open date of 4/20/2020. She is appreciative of the outreach and would like continued weekly calls for updates and continued support in her rehab.

## 2020-04-04 ENCOUNTER — TELEPHONE (OUTPATIENT)
Dept: CARDIOLOGY | Facility: MEDICAL CENTER | Age: 80
End: 2020-04-04

## 2020-04-04 NOTE — TELEPHONE ENCOUNTER
Alondra was contacted to offer Cardiac Rehabilitation support during [facility]'s clinic closure due to the COVID-19 crisis. An incomplete ITP evaluation was also conducted over the phone. Alondra states that she is doing well. She is continuing her 30 minute walks daily. She is grocery shopping during regular business hours. RN reinforced her risk factors for COVID-19 and reinforced the use of a mask while in public. RN provided education on how to make-shift a mask and reviewed resources for grocery delivery service. RN will have RD reach out to patient for nutrition support during this time. Alondra reports that she has picked up prescriptions from the pharmacy the last two days but finds the cost a hardship and she has concerns about her degree of bruising on Eliquis. RN sent message to KENISHA Nuñez to review medications and reach out to patient. Patient does not have MyChart. RN reinforced with Alondra to not make any medication changes until she talked with RUBI Peters. Alondra received the group email from [facility] this week and found the content helpful. Alondra was ensured that staff would be reaching out to her next week to offer updates and answer any questions or concerns.

## 2020-04-09 ENCOUNTER — TELEPHONE (OUTPATIENT)
Dept: CARDIOLOGY | Facility: MEDICAL CENTER | Age: 80
End: 2020-04-09

## 2020-04-09 DIAGNOSIS — I48.0 PAF (PAROXYSMAL ATRIAL FIBRILLATION) (HCC): ICD-10-CM

## 2020-04-09 DIAGNOSIS — Z79.01 CHRONIC ANTICOAGULATION: ICD-10-CM

## 2020-04-09 NOTE — TELEPHONE ENCOUNTER
Patient Call   Chelsea Armstrong M.D.  You 1 hour ago (2:56 PM)      Xarelto OK with me   15 mg/d    Routing comment      Called pt and reviewed findings.  Pt verbalizes understanding and states she would like to proceed with recommendations.  Preferred pharmacy verified, [address]  She states no other concerns or questions at this time and is appreciative of information given.    Medication list updated.  Medication sent to preferred pharmacy.

## 2020-04-09 NOTE — TELEPHONE ENCOUNTER
"Returned pt call and reviewed MD recommendations.  Pt states she is not taking ASA.  She states she takes Eliquis twice a day, but is requesting if she can take it once daily.  She states, \"My hands and arms have blue marks all over.\"   Upon chart review, \"Will check into co-pay for Xarelto.\"  Reviewed findings with pt.  She states she will call her insurance to verify, but states she paid $400 out of pocket.  Informed pt that her deductible is probably not met at this time and continued to encourage pt to follow up with insurance company to verify.  Reassurance given that request will be relayed to MD for advise.  She verbalizes understanding and request for last OV note to be mailed to her.  Reassurance given that last OV note will be mailed to pt.  She states no other concerns or questions at this time and is appreciative of information given.    Pt request relayed to MD for advise.    -----------------------  FW: Chikis Received: Today  Call patient Message Contents   EDWARD Levy R.N.             Unfortunately with her PAF and prior MI, it would not be safe without NOACs   Make sure she is no t taking ASA   She can however stop Plavix at the end of May    Previous Messages      ----- Message -----   From: Marlyn Adames R.N.   Sent: 4/9/2020   8:47 AM PDT   To: Chelsea Armstrong M.D.   Subject: FW: Eliquis                                         ----- Message -----   From: KENISHA Matos   Sent: 4/6/2020  12:33 PM PDT   To: Marlyn Adames R.N.   Subject: RE: Chikis Cline,     Thank you for reaching out. This patient was actually seen last by Dr. Armstrong who is her primary cardiologist. I think that this should be ran by him.     Thank you,     Trudy   ----- Message -----   From: Marlyn Adames R.N.   Sent: 4/4/2020   2:26 PM PDT   To: Trudy C Petesr, A.P.R.N.   Subject: Eliquis                         "                   Rhea Yates,     I spoke with Alondra today and she has some concerns about her Eliquis. She is troubled by the amount of bruising she is experiencing with the medication. She feels it is getting worse though she is not experiencing any nose bleeds, bleeding gums, bloody emesis, or stool. She also feels the cost is somewhat prohibitive during this time. She was asking me if she could decrease the medication to once per day. She does not have MyChart so I am trying to assist her in facilitating a review and discussion of her medication. I reinforced the importance of the medication for her PAF and not to make changes to her medications without speaking to you first.     Thank you, in advance, for reaching out to her.     Wishing you well,     Marlyn

## 2020-04-12 ENCOUNTER — TELEPHONE (OUTPATIENT)
Dept: CARDIOLOGY | Facility: MEDICAL CENTER | Age: 80
End: 2020-04-12

## 2020-04-12 NOTE — TELEPHONE ENCOUNTER
Elvie was called for a follow up on her Cardiac Rehabilitation maintenance during the [facility] clinic closure due to the COVID-19 crisis. Alondra states that she is feeling well and walking each day. She is also completing the [employer] workout daily that [facility]'s EP emailed two weeks ago. She states it takes her 10-20 minutes each day to complete. She is back to work. Her only concern is with the medication Dr. Conner has offered her to switch to--Xarelto--is just as expensive as the Eliquis she has been taking. Since she has a new prescription of Eliquis in hand, she will continue taking it as prescribed and plans to discuss other options with Dr. Conner. RN has messaged Dr. Conner with patient's concerns. Alondra appreciates the outreach and was informed that staff will be in contact next week with any updates and to assist with any questions/concerns.

## 2020-04-17 ENCOUNTER — TELEPHONE (OUTPATIENT)
Dept: CARDIOLOGY | Facility: MEDICAL CENTER | Age: 80
End: 2020-04-17

## 2020-04-17 NOTE — TELEPHONE ENCOUNTER
Elvie was contacted by phone to offer Cardiac Rehabilitation support during clinic closure for the COVID-19 crisis. She reports she is doing well. She has been back to work the last two weeks and states that only three staff members are coming in and the rest are working from home. She does not report that she is taking full precautions (wearing mask and gloves and social distancing) when she is out of the house and at work and was kindly encouraged to do so. She is not feeling stressed around work or finances. 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 So far, she has not needed to let go of any employees due to financial hardship. She reports she is exercising daily to keep the progress she made in [facility]. Staff relayed that her concerns about Eliquis and Xarelto were relayed to her doctor last week. She was also informed that, at this time, [facility] does not have a predicted re-open date. She appreciates the outreach. She was assured that staff would be calling next week to offer support and updates.

## 2020-04-24 ENCOUNTER — TELEPHONE (OUTPATIENT)
Dept: CARDIOLOGY | Facility: MEDICAL CENTER | Age: 80
End: 2020-04-24

## 2020-04-25 NOTE — TELEPHONE ENCOUNTER
"RN reached out to Alondra by phone to offer Cardiac Rehabilitation support during clinic closure for the COVID-19 crisis. She does not report any questions or concerns. She stated that she is having a gathering of three friends tomorrow evening out on her patio \"to catch up.\" She was encouraged to practice social distancing and for all to wear masks.  RN relayed ICR does not have a predicted date to re-open at this time but staff will continue to make weekly or bi-weekly phone calls to offer support and provide updates.  "

## 2020-05-01 ENCOUNTER — TELEPHONE (OUTPATIENT)
Dept: CARDIOLOGY | Facility: MEDICAL CENTER | Age: 80
End: 2020-05-01

## 2020-05-01 NOTE — TELEPHONE ENCOUNTER
Elvie returned RN's voicemail to report that she is feeling well though feeling stir-crazy with the continued closures. 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 She is walking daily and eating her fruits and vegetables. RN will contact her next week to offer support and updates.

## 2020-05-01 NOTE — TELEPHONE ENCOUNTER
Elvie was called to reinforce her Cardiac Rehab during ICR clinic closure for the COVID-19 crisis. RN left a message stating there are no new updates regarding ICR re-opening. RN will reach out to Alondra next weeks to offer support and updates.

## 2020-05-10 ENCOUNTER — TELEPHONE (OUTPATIENT)
Dept: CARDIOLOGY | Facility: MEDICAL CENTER | Age: 80
End: 2020-05-10

## 2020-05-10 NOTE — TELEPHONE ENCOUNTER
RN called Elvie to offer Cardiac Rehabilitation support during clinic closure for the COVID-19 crisis. Alondra was just getting back from [facility] with family for Mother's Day. Alondra asked if we would be opening ICR soon. RN communicated that the clinic is in the process of making revisions and creating new policies and procedures to ensure the safety of all patients and staff. Patient understood and RN reassured her that she will continue to call weekly to provide updates.

## 2020-06-01 NOTE — PROGRESS NOTES
Individualized Treatment Plan   Cardiac Rehab Individual Treatment Plan  Initial/Reassessment/Discharge Assessment/ ReAssessment/ Discharge: Discharge 20 Session #     19   MRN: [id number] Allergies: Sulfa drugs   Patient Name: Elvie Otero : 1940 Risk Stratification: High    Diagnoses:   1. Stented coronary artery     Age: 79 y.o. Physician: Jaime Merino M.D.    Date of Event: 19 Specialist: Leigh Ann   Risk Factors:  Hypertension, Hyperlipidemia, Family History, Stress, Sedentary Lifestyle, Age, Female > 55   Exercise Nutrition Other Core Components/ Risk Factors Psychosocial   Stages of change Stages of change Stages of change Stages of change   Action Contemplate Preparation Preparation   Fitness Test Lipids Learning Barriers Plan   DASI: (na) Available: No new Learning Barriers: Ready to Learn Psychosocial Plan: Yes   DIST: (unable to reassess. Discharged due to COVID-19.) Date: 19 Family Support Intervention   Max HR: (unable to reassess. Discharged due to COVID-19.) Total: 120 mg/dL Yes Behavioral Health Consult: Yes   RPE: (unable to reassess. Discharged due to COVID-19.) Tri mg/dL Lives: Other(son) Physician Referral: No   SPO2: (unable to reassess. Discharged due to COVID-19.) HDL: 48 mg/dL Other Risk Factors Plan Identifies Stressors: Yes   MET: (unable to reassess. Discharged due to COVID-19.) LDL: 59 mg/dL Other Risk Factors/Plan: Yes Drug Intervention: No   EF= 60(2019) Diabetes Tobacco Use Outcome Survey Tools   Ambulatory Status Diabetes: No Social History     Tobacco Use   Smoking Status Never Smoker   Smokeless Tobacco Never Used    FP QOL Overall Score: (unable to reassess. Discharged due to COVID-19.)   Fall Risk Assessed: (unable to reassess) HbA1C: 5 % Date: 19 Smoking Intervention PHQ-9: (unable to reassess. Discharged due to COVID-19.)   Exercise Ambulatory Status Assist Devices: None Monitors BS at Home: No Smoking Cessation Referral: N/A  "    Intervention Frequency: na Random BS: 94(2/25/20) Ind. Education / Counseling: N/A Education   Intervention: Yes    MBSR Lectures/Videos: Yes   Exercise Prescription/ Exercise Plan       Mode: Treadmill, NuStep, UBE, Airdyne Weight Management Tobacco Adjunct: N/A Psychosocial Education: Coping Techniques, Positive Support System, S/S Depression, MBSR Lectures   Frequency: 3 days/week Weight: (unable to reassess. Discharged due to COVID-19.) Target Goal Target Goal   Duration: 45-60 min Height: 157.5 cm (5' 2.01\") Complete Tobacco Cessation: Assess presence or absence of depression using a valid screening: Yes   Intensity: 11-13 RPE   Complete Tobacco Cessation: N/A Use Stress Management: Yes   METS: 1.0-3.0 Goal weight: 150 Intervention Adverse Events: Yes   Progression: ^ increments of 1-3 to THR/RPE as tolerated Waist: (unable to reassess. Discharged due to COVID-19.) Healthy Heart Education: Class Schedule Given, Cooking School Attended, Medications Reviewed, Patient Education Binder Provided, Risk Factors Discussed, Videos Viewed per Alejandra, Workshops Attended Unexpected Events: Yes      Goals     THR: THR: Other (see comment)(85-95) Social History     Substance and Sexual Activity   Alcohol Use Yes    Comment: 1 per week    Educate / Review and have understanding of cardiac disease prevention:    Angina with Exercise? Angina with Exercise: No Nutrition Plan Educate / Review and have understanding of cardiac disease prevention: Yes      Nutrition Plan: Yes Medication Compliance: Yes    Resistance Training? Resistance Training: Yes Intervention Hypertension Intervention      Dietician Consult/Class: No(unable to attended 1:1. Discharged due to COVID-19.)     Goals Nurse/Patient Discussion: Yes Weekly Lectures/ Videos/ Interactive Cooking School: Yes    Home Exercise: Yes Diabetes Ed Referral: N/A Hypertension Management    Mode: Walk Discuss Maintenance /Wt Loss: Yes Resting BP: (unable to reassess. " Discharged due to COVID-19.)    Duration: 30 minute walk daily Attend Cooking School: Yes     Frequency: 7 days active  Dietary Goal: >=58 rate your plate, low sodium    Education Education    Yes Nutrition Education: Healthy Plant Based Eating, Sodium Reduction Cooking/ Lectures/ Cooking School/  RD 1:1     Equipment Orientation, Exercise Safety, S/S to Report, RPE Scale, Warm Up / Cool Down, Physically Active Target Goal    Target Goal LDL-C < 100 if trig. > 200:  N/A    Start Individual Exercise Rx Yes LDL-C < 70 for high risk patient:  Yes    BP < 140/90 or < 130/80 if DM or CKD Yes Non HDL-C Should be < 130:  Yes    Aerobic activity 30 + min / day 5 days / wk Yes HbA1C < 7%: Yes      BMI < 25: No(<26)           Exercise     Current: Current : 25 minutes on the treadmill and 6 minutes on the Nustep in [facility], plus a weight routine. Her current METS on the treadmill are 3.1. She is walking 30 minutes daily at home.   Goal: Goal: To remain consistent with her exercise during [facility] closure for COVID-19 crisis.  Progress: Progress: Alondra states she has never been an exerciser but after her heart event is motivated to change her habits. She states her stamina has improving with the consistent exercise in [facility] and she does not want to lose that so she is walking daily at home while the clinic is closed.      Nutrition     Current: Current: She is shopping during regular shopping hours at [employer]. RN reinforced that she must wear a mask if out in public. She was unable to meet with RD before [facility] clinic closure for COVID-19 crisis, but Alondra states she received RD's group emails during clinic closure.   Goal: Goal: To eat more frequent meals. To eat ice cream less frequently.   Progress: Progress: Alondra is able to obtain the groceries she needs during this time.      Hypertension     Current: Current: (unable to reassess. Discharged due to COVID-19.)  Goal: Goal: Low sodium diet, medication adherence, home monitoring.    Progress: Progress: Willing to meet the above goals.      Stress     Current: Current : Alondra states that she is feeling very well. She discussed some concerns about her Eliquis medication with RN. She is enjoying daily walks to keep her fitness up and for stress-management.   Goal: Goal: To maintain low stress.  Progress: Progress: Alondra is managing her external and internal stressors well.      Other     Notes: Alondra has chosen to discharge from the ICR program due to the COVID-19 crisis. She was given the option to return upon the clinic's re-opening but she feels she learned a great deal in the time she attended [facility] and is motivated to continue exercising, watching her diet and managing her stress on her own.

## 2020-06-01 NOTE — PROGRESS NOTES
Intensive Cardiac Rehabilitation (ICR) and Individual Treatment Plan (ITP) reviewed and signed.  Caroline Snyder MD

## 2020-07-06 ENCOUNTER — OFFICE VISIT (OUTPATIENT)
Dept: URGENT CARE | Facility: CLINIC | Age: 80
End: 2020-07-06
Payer: MEDICARE

## 2020-07-06 VITALS
SYSTOLIC BLOOD PRESSURE: 124 MMHG | HEART RATE: 73 BPM | WEIGHT: 150 LBS | OXYGEN SATURATION: 97 % | BODY MASS INDEX: 27.6 KG/M2 | RESPIRATION RATE: 16 BRPM | TEMPERATURE: 97.3 F | DIASTOLIC BLOOD PRESSURE: 70 MMHG | HEIGHT: 62 IN

## 2020-07-06 DIAGNOSIS — R23.3 EASY BRUISING: ICD-10-CM

## 2020-07-06 PROCEDURE — 99213 OFFICE O/P EST LOW 20 MIN: CPT | Performed by: FAMILY MEDICINE

## 2020-07-06 RX ORDER — APIXABAN 5 MG/1
TABLET, FILM COATED ORAL
COMMUNITY
Start: 2020-05-30 | End: 2020-07-16 | Stop reason: SINTOL

## 2020-07-06 ASSESSMENT — FIBROSIS 4 INDEX: FIB4 SCORE: 1.11

## 2020-07-06 ASSESSMENT — ENCOUNTER SYMPTOMS
BRUISES/BLEEDS EASILY: 1
VOMITING: 0
SHORTNESS OF BREATH: 0
TINGLING: 0
SENSORY CHANGE: 0
FEVER: 0

## 2020-07-06 NOTE — PROGRESS NOTES
Subjective:     Elvie Otero is a 79 y.o. female who presents for Bleeding/Bruising (hands and wrist bruising and bleeding, thinks it is due to medication)    HPI  Pt presents for evaluation of a new problem   Pt with bruising in hands and wrists for the past few days   Has a few places over her hands/wrists which have broken through the skin due to very minor bumps and caused bleeding  Patient trying to take care of the wounds herself, though when she changes the Band-Aids the wounds rebleed  Pt takes Elequis after an MI due to a stent    Review of Systems   Constitutional: Negative for fever.   Respiratory: Negative for shortness of breath.    Cardiovascular: Negative for chest pain.   Gastrointestinal: Negative for vomiting.   Skin: Negative for rash.   Neurological: Negative for tingling and sensory change.   Endo/Heme/Allergies: Bruises/bleeds easily.     PMH:  has a past medical history of Heart burn and Hypertension.  MEDS:   Current Outpatient Medications:   •  ELIQUIS 5 MG Tab, TAKE 1 TABLET BY MOUTH TWICE A DAY, Disp: , Rfl:   •  omeprazole (PRILOSEC) 20 MG delayed-release capsule, Take 20 mg by mouth every day., Disp: , Rfl:   •  atenolol (TENORMIN) 25 MG Tab, Take 0.5 Tabs by mouth every evening., Disp: 90 Tab, Rfl: 3  •  fexofenadine (ALLEGRA) 180 MG tablet, Take 180 mg by mouth as needed., Disp: , Rfl:   •  Melatonin 5 MG Tab, Take 5 mg by mouth 1 time daily as needed., Disp: , Rfl:   •  acetaminophen (TYLENOL) 500 MG Tab, Take 500-1,000 mg by mouth as needed., Disp: , Rfl:   •  atorvastatin (LIPITOR) 40 MG Tab, Take 1 Tab by mouth every evening., Disp: 90 Tab, Rfl: 3  •  clopidogrel (PLAVIX) 75 MG Tab, Take 1 Tab by mouth every day., Disp: 90 Tab, Rfl: 3  •  rivaroxaban (XARELTO) 15 MG Tab tablet, Take 1 Tab by mouth with dinner., Disp: 30 Tab, Rfl: 11  ALLERGIES:   Allergies   Allergen Reactions   • Sulfa Drugs Vomiting     SURGHX:   Past Surgical History:   Procedure Laterality Date   •  "RHYTIDECTOMY  10/15/2012    Performed by Nelsy Sun M.D. at SURGERY [facility] ORS   • BLEPHAROPLASTY  10/15/2012    Performed by Nelsy Sun M.D. at SURGERY [facility]   • CHOLECYSTECTOMY  2004   • HYSTERECTOMY LAPAROSCOPY  1975     SOCHX:  reports that she has never smoked. She has never used smokeless tobacco. She reports current alcohol use. She reports that she does not use drugs.  FH: Family history was reviewed, not contributing to acute bleeding     Objective:   /70 (BP Location: Left arm, Patient Position: Sitting, BP Cuff Size: Adult)   Pulse 73   Temp 36.3 °C (97.3 °F) (Temporal)   Resp 16   Ht 1.575 m (5' 2\")   Wt 68 kg (150 lb)   SpO2 97%   BMI 27.44 kg/m²     Physical Exam  Constitutional:       General: She is not in acute distress.     Appearance: She is well-developed. She is not diaphoretic.   HENT:      Head: Normocephalic and atraumatic.   Musculoskeletal:      Comments: Multiple small open wounds with slow oozing of blood over bilateral hands/wrists.  Largest wound approximately 1 cm x 1 cm.  No surrounding erythema, no purulent exudate present, no drainable abscess appreciated   Skin:     General: Skin is warm and dry.      Findings: No erythema.   Neurological:      Mental Status: She is alert and oriented to person, place, and time.      Sensory: No sensory deficit.   Psychiatric:         Behavior: Behavior normal.         Thought Content: Thought content normal.         Judgment: Judgment normal.       Assessment/Plan:   Assessment    1. Easy bruising  Patient with easy bruising/bleeding.  Has multiple small wounds on her hands/wrists.  Wounds were cleaned and dressed in office.  Reviewed proper wound care for these types of wounds.  Patient has follow-up with her cardiologist to discuss medications.  Did not advise her to suddenly stop her blood thinners without first discussing with cardiologist.  Did advise that her current blood thinner may not " be the right one for her as she has significant bleeding problems on this med.  Follow-up with cardiology to discuss options.

## 2020-07-16 ENCOUNTER — OFFICE VISIT (OUTPATIENT)
Dept: CARDIOLOGY | Facility: MEDICAL CENTER | Age: 80
End: 2020-07-16
Payer: MEDICARE

## 2020-07-16 VITALS
WEIGHT: 148 LBS | SYSTOLIC BLOOD PRESSURE: 120 MMHG | HEIGHT: 62 IN | DIASTOLIC BLOOD PRESSURE: 64 MMHG | BODY MASS INDEX: 27.23 KG/M2 | HEART RATE: 79 BPM

## 2020-07-16 DIAGNOSIS — I48.0 PAF (PAROXYSMAL ATRIAL FIBRILLATION) (HCC): ICD-10-CM

## 2020-07-16 DIAGNOSIS — D50.8 OTHER IRON DEFICIENCY ANEMIA: ICD-10-CM

## 2020-07-16 DIAGNOSIS — I71.010 ASCENDING AORTIC DISSECTION (HCC): ICD-10-CM

## 2020-07-16 DIAGNOSIS — I25.10 CORONARY ARTERY DISEASE INVOLVING NATIVE CORONARY ARTERY OF NATIVE HEART WITHOUT ANGINA PECTORIS: ICD-10-CM

## 2020-07-16 DIAGNOSIS — Z95.5 S/P RIGHT CORONARY ARTERY (RCA) STENT PLACEMENT: ICD-10-CM

## 2020-07-16 PROBLEM — I48.91 ATRIAL FIBRILLATION WITH RVR (HCC): Status: RESOLVED | Noted: 2019-12-13 | Resolved: 2020-07-16

## 2020-07-16 PROBLEM — E86.1 HYPOTENSION DUE TO HYPOVOLEMIA: Status: RESOLVED | Noted: 2020-01-21 | Resolved: 2020-07-16

## 2020-07-16 PROBLEM — I95.89 HYPOTENSION DUE TO HYPOVOLEMIA: Status: RESOLVED | Noted: 2020-01-21 | Resolved: 2020-07-16

## 2020-07-16 PROCEDURE — 99214 OFFICE O/P EST MOD 30 MIN: CPT | Performed by: NURSE PRACTITIONER

## 2020-07-16 RX ORDER — ASPIRIN 81 MG/1
81 TABLET, CHEWABLE ORAL DAILY
Qty: 100 TAB | Refills: 3 | Status: SHIPPED | OUTPATIENT
Start: 2020-07-16 | End: 2021-01-04 | Stop reason: SDUPTHER

## 2020-07-16 RX ORDER — ATORVASTATIN CALCIUM 40 MG/1
40 TABLET, FILM COATED ORAL EVERY EVENING
Qty: 90 TAB | Refills: 3 | Status: SHIPPED | OUTPATIENT
Start: 2020-07-16 | End: 2021-01-04 | Stop reason: SDUPTHER

## 2020-07-16 RX ORDER — ATENOLOL 25 MG/1
12.5 TABLET ORAL EVERY EVENING
Qty: 90 TAB | Refills: 3 | Status: SHIPPED | OUTPATIENT
Start: 2020-07-16 | End: 2021-01-04 | Stop reason: SDUPTHER

## 2020-07-16 ASSESSMENT — ENCOUNTER SYMPTOMS
WEIGHT LOSS: 0
WEAKNESS: 0
CLAUDICATION: 0
SHORTNESS OF BREATH: 0
PND: 0
DIZZINESS: 0
BRUISES/BLEEDS EASILY: 1
ORTHOPNEA: 0
PALPITATIONS: 0

## 2020-07-16 ASSESSMENT — FIBROSIS 4 INDEX: FIB4 SCORE: 1.11

## 2020-07-16 NOTE — PROGRESS NOTES
Chief Complaint   Patient presents with   • Atrial Fibrillation     W/ RVR       Subjective:   lAondra Otero is a 79 y.o. female who presents today with c/o excessive bleeding with DAPT.     Patient was last seen by Dr. Armstrong on 2/28/20, she was doing well from a cardiovascular standpoint and no significant changes were made to her cardiovascular regimen.     Past medical history significant for hypertension.    Today patient states that her primary concern today is that she is having excessive bruising and any small scratch or cut will bleed easily and can be difficult to stop sometimes. Denies blood in her stool or urine. She is also having increased fatigue as well.     She denies palpitations, edema, SOB, chest pain, dizziness or syncope.     Past Medical History:   Diagnosis Date   • Heart burn    • Hypertension      Past Surgical History:   Procedure Laterality Date   • RHYTIDECTOMY  10/15/2012    Performed by Nelsy Sun M.D. at SURGERY [facility] ORS   • BLEPHAROPLASTY  10/15/2012    Performed by Nelsy Sun M.D. at [facility] ORS   • CHOLECYSTECTOMY  2004   • HYSTERECTOMY LAPAROSCOPY  1975     No family history on file.  Social History     Socioeconomic History   • Marital status:      Spouse name: Not on file   • Number of children: Not on file   • Years of education: Not on file   • Highest education level: Not on file   Occupational History   • Not on file   Social Needs   • Financial resource strain: Not on file   • Food insecurity     Worry: Not on file     Inability: Not on file   • Transportation needs     Medical: Not on file     Non-medical: Not on file   Tobacco Use   • Smoking status: Never Smoker   • Smokeless tobacco: Never Used   Substance and Sexual Activity   • Alcohol use: Yes     Comment: 1 per week   • Drug use: No   • Sexual activity: Not on file   Lifestyle   • Physical activity     Days per week: Not on file     Minutes per session: Not  on file   • Stress: Not on file   Relationships   • Social connections     Talks on phone: Not on file     Gets together: Not on file     Attends Latter day service: Not on file     Active member of club or organization: Not on file     Attends meetings of clubs or organizations: Not on file     Relationship status: Not on file   • Intimate partner violence     Fear of current or ex partner: Not on file     Emotionally abused: Not on file     Physically abused: Not on file     Forced sexual activity: Not on file   Other Topics Concern   • Not on file   Social History Narrative   • Not on file     Allergies   Allergen Reactions   • Sulfa Drugs Vomiting     Outpatient Encounter Medications as of 7/16/2020   Medication Sig Dispense Refill   • aspirin (ASA) 81 MG Chew Tab chewable tablet Take 1 Tab by mouth every day. 100 Tab 3   • atorvastatin (LIPITOR) 40 MG Tab Take 1 Tab by mouth every evening. 90 Tab 3   • atenolol (TENORMIN) 25 MG Tab Take 0.5 Tabs by mouth every evening. 90 Tab 3   • omeprazole (PRILOSEC) 20 MG delayed-release capsule Take 20 mg by mouth every day.     • fexofenadine (ALLEGRA) 180 MG tablet Take 180 mg by mouth as needed.     • Melatonin 5 MG Tab Take 5 mg by mouth 1 time daily as needed.     • acetaminophen (TYLENOL) 500 MG Tab Take 500-1,000 mg by mouth as needed.     • clopidogrel (PLAVIX) 75 MG Tab Take 1 Tab by mouth every day. 90 Tab 3   • [DISCONTINUED] ELIQUIS 5 MG Tab TAKE 1 TABLET BY MOUTH TWICE A DAY     • [DISCONTINUED] rivaroxaban (XARELTO) 15 MG Tab tablet Take 1 Tab by mouth with dinner. (Patient not taking: Reported on 7/16/2020) 30 Tab 11   • [DISCONTINUED] atenolol (TENORMIN) 25 MG Tab Take 0.5 Tabs by mouth every evening. 90 Tab 3   • [DISCONTINUED] atorvastatin (LIPITOR) 40 MG Tab Take 1 Tab by mouth every evening. 90 Tab 3     No facility-administered encounter medications on file as of 7/16/2020.      Review of Systems   Constitutional: Positive for malaise/fatigue. Negative  "for weight loss.   Respiratory: Negative for shortness of breath.    Cardiovascular: Negative for chest pain, palpitations, orthopnea, claudication, leg swelling and PND.   Neurological: Negative for dizziness and weakness.   Endo/Heme/Allergies: Bruises/bleeds easily.   All other systems reviewed and are negative.       Objective:   /64 (BP Location: Left arm, Patient Position: Sitting, BP Cuff Size: Adult)   Pulse 79   Ht 1.575 m (5' 2\")   Wt 67.1 kg (148 lb)   BMI 27.07 kg/m²     Physical Exam   Constitutional: She is oriented to person, place, and time. She appears well-developed and well-nourished. No distress.   HENT:   Head: Normocephalic.   Eyes: EOM are normal.   Neck: No JVD present.   Cardiovascular: Normal rate, regular rhythm and normal heart sounds.   Pulmonary/Chest: Breath sounds normal. No respiratory distress.   Abdominal: Soft. There is no abdominal tenderness.   Musculoskeletal:         General: No edema.   Neurological: She is alert and oriented to person, place, and time.   Skin: Skin is warm and dry. Ecchymosis (Arms and legs) noted.   Multiple bandaids observed on both arms.   Psychiatric: She has a normal mood and affect. Her behavior is normal.        Thoracoabdominal CT-CTA 12/17/19:  1.  Intramural hematoma of the ascending aorta has decreased in size and density. It extends approximately retirement through the ascending aorta. No dissection flap. No aortic aneurysm.  2.  No acute abnormality in the chest, abdomen or pelvis.  3.  Stable 2.2 cm left thyroid nodule can be further evaluated with outpatient ultrasound.  4.  Colonic diverticulosis.     Echocardiogram 12/11/19:  CONCLUSIONS  No prior study is available for comparison.   Technically difficult study.   Normal left ventricular chamber size.  Inferior wall hypokinesis.  Left ventricular systolic function is normal.  Left ventricular ejection fraction is visually estimated to be 55-60%.  Normal left ventricular wall " thickness.  Normal pericardium without effusion.    Cardiac Catheterization 12/11/19:  Coronary artery disease     This is right dominant system.     Left main is large and without flow limiting disease.  It bifurcated into left anterior descending and left circumflex artery.      Left anterior descending artery is large caliber vessel.  It gives rises to several small  diagonal branches. Limited images was obtained but there is no signficant disease in the left anterior descending artery or its major branches seen.  The antegrade flow is normal.     Left circumflex artery is large in caliber. Limited image of the LCX was also obtained.  It gives rise to several obtuse marginal branches.  There is no significant disease in the LCX system.  The antegrade flow is normal.     Right coronary artery (RCA) is large caliber.   At debating beginning the case, the RCA was occluded distally at the crux after giving rise to several small acute marginal branches. There was also large amount of thrombus causing subtotal occlusion at the ostium of the posterior descending artery and the posterolateral branch was not visualized.     After intervention, there was no significant residual stenosis in the distal RCA or the posterolateral branch with residual thrombus at the ostium of the PDA with ALEX II flow.      Post-operative Diagnosis:   1.  Acute inferior ST elevation microinfarction secondary to total occlusion of distal right coronary artery  2.  Second-degree AV block Mobitz type I  3.  Hypokinesis of the basal to mid inferior wall with preserved overall LV systolic function  4.  Status post stenting of the ostium and distal right coronary artery with 4 x 23 mm bare-metal stent and 2.25 x 18 mm Bulmaro drug eluting stent respectively     Assessment:     1. Coronary artery disease involving native coronary artery of native heart without angina pectoris  CBC WITH DIFFERENTIAL    Comp Metabolic Panel   2. PAF (paroxysmal atrial  "fibrillation) ([facility])  Cardiac Event Monitor   3. S/P right coronary artery (RCA) stent placement     4. Other iron deficiency anemia     5. Ascending aortic dissection ([facility])         Medical Decision Making:  Today's Assessment / Status / Plan:     CAD S/P Stemi:  -S/P revascularization of the RCA (NILSA to distal RCA, BMS to ostial RCA and PTCA of PDA) on 12/11/2019 which was complicated by a contained a sending aortic dissection.   -Per cath report \"no significant residual stenosis of the distal RCA or the posterolateral branch with residual thrombus at the ostium of the PDA with ALEX II flow\".   -Preserved LVEF with inferior wall hypokinesis per TTE in December of 2019.   -Denies angina or ELLSWORTH.   -Continue Plavix 75 mg daily and atorvastatin 40 mg daily.  -Start ASA 81 mg daily due to discontinuation of Eliquis and stent placement 7 months ago.   -Routine CMP ordered.     Ascending aortic dissection:  -Repeat CTA of aorta/chest/pelvis completed prior to discharge showed decrease in size and density.     PAF:  -Occurred port STEMI, reverted with IV amio.   -Rate remains regular upon auscultation today.  -Discussed risk and benefit of stopping the Eliquis, patient wishes to stop. Will D/C.  -Continue DAPT and Atenolol as above.   -28 day Biotel ordered to assess PAF burden.     Anemia:  -Patient c/o increased fatigue, CBC ordered.     Patient will follow-up with Dr. Armstrong as scheduled below or earlier if needed. Encouraged patient to contact our office should any questions or concerns arise in the meantime. Patient understands and agrees with the plan of care.       Collaborating Provider: Dr. Chelsea Armstrong        Please note that this dictation was created using voice recognition software. I have made every reasonable attempt to correct obvious errors, but I expect that there are errors of grammar and possibly content I did not discover before finalizing the note.        "

## 2020-07-16 NOTE — PATIENT INSTRUCTIONS
Stop taking your Eliquis,    Start Aspirin 81 mg daily tomorrow (7/16/20).    Ordering a 28 day cardiac event monitor to make sure you are not having any more Atrial Fibrillation.    Follow up with Dr. Armstrong in December to talk about stopping the Plavix as it will be one year since your stents were placed.

## 2020-07-24 ENCOUNTER — TELEPHONE (OUTPATIENT)
Dept: CARDIOLOGY | Facility: MEDICAL CENTER | Age: 80
End: 2020-07-24

## 2020-07-24 DIAGNOSIS — I25.10 CORONARY ARTERY DISEASE INVOLVING NATIVE CORONARY ARTERY OF NATIVE HEART WITHOUT ANGINA PECTORIS: ICD-10-CM

## 2020-07-24 LAB
ALBUMIN SERPL-MCNC: 4.2 G/DL (ref 3.6–4.6)
ALBUMIN/GLOB SERPL: 1.8 {RATIO} (ref 1.2–2.2)
ALP SERPL-CCNC: 79 IU/L (ref 39–117)
ALT SERPL-CCNC: 12 IU/L (ref 0–32)
AST SERPL-CCNC: 19 IU/L (ref 0–40)
BASOPHILS # BLD AUTO: 0.1 X10E3/UL (ref 0–0.2)
BASOPHILS NFR BLD AUTO: 1 %
BILIRUB SERPL-MCNC: 0.7 MG/DL (ref 0–1.2)
BUN SERPL-MCNC: 15 MG/DL (ref 8–27)
BUN/CREAT SERPL: 14 (ref 12–28)
CALCIUM SERPL-MCNC: 9.7 MG/DL (ref 8.7–10.3)
CHLORIDE SERPL-SCNC: 99 MMOL/L (ref 96–106)
CO2 SERPL-SCNC: 25 MMOL/L (ref 20–29)
CREAT SERPL-MCNC: 1.04 MG/DL (ref 0.57–1)
EOSINOPHIL # BLD AUTO: 0.1 X10E3/UL (ref 0–0.4)
EOSINOPHIL NFR BLD AUTO: 1 %
ERYTHROCYTE [DISTWIDTH] IN BLOOD BY AUTOMATED COUNT: 12 % (ref 11.7–15.4)
GLOBULIN SER CALC-MCNC: 2.3 G/DL (ref 1.5–4.5)
GLUCOSE SERPL-MCNC: 141 MG/DL (ref 65–99)
HCT VFR BLD AUTO: 39.3 % (ref 34–46.6)
HGB BLD-MCNC: 13.5 G/DL (ref 11.1–15.9)
IMM GRANULOCYTES # BLD AUTO: 0 X10E3/UL (ref 0–0.1)
IMM GRANULOCYTES NFR BLD AUTO: 0 %
IMMATURE CELLS  115398: ABNORMAL
LYMPHOCYTES # BLD AUTO: 1.6 X10E3/UL (ref 0.7–3.1)
LYMPHOCYTES NFR BLD AUTO: 17 %
MCH RBC QN AUTO: 32.5 PG (ref 26.6–33)
MCHC RBC AUTO-ENTMCNC: 34.4 G/DL (ref 31.5–35.7)
MCV RBC AUTO: 95 FL (ref 79–97)
MONOCYTES # BLD AUTO: 0.4 X10E3/UL (ref 0.1–0.9)
MONOCYTES NFR BLD AUTO: 4 %
MORPHOLOGY BLD-IMP: ABNORMAL
NEUTROPHILS # BLD AUTO: 7.1 X10E3/UL (ref 1.4–7)
NEUTROPHILS NFR BLD AUTO: 77 %
NRBC BLD AUTO-RTO: ABNORMAL %
PLATELET # BLD AUTO: 246 X10E3/UL (ref 150–450)
POTASSIUM SERPL-SCNC: 4.3 MMOL/L (ref 3.5–5.2)
PROT SERPL-MCNC: 6.5 G/DL (ref 6–8.5)
RBC # BLD AUTO: 4.16 X10E6/UL (ref 3.77–5.28)
SODIUM SERPL-SCNC: 139 MMOL/L (ref 134–144)
WBC # BLD AUTO: 9.3 X10E3/UL (ref 3.4–10.8)

## 2020-07-24 NOTE — TELEPHONE ENCOUNTER
Message   Received: Today   Message Contents   Chelsea Armstrong M.D.  Quiana Prieto R.N.               No longer anemic   Other lab NL except glucose high   If fasting, need to cut carbohydrate and need recheck in a few months and check with PCP      Result Notes for CBC WITH DIFFERENTIAL     Notes recorded by Quiana Prieto R.N. on 7/24/2020 at 11:24 AM PDT   FV scheduled for 08/28/20   JS out of office, thank you!        Attempted to call pt, no answer, LM for her to call back.

## 2020-07-24 NOTE — LETTER
July 29, 2020        Alondra Otero  [address] [place] [address]          Dear Alondra,      Dr. Armstrong reviewed your recent lab results. He states you are no longer anemic. Your lab results were within normal limits except for your glucose level which was elevated. If you were fasting prior to your lab test, he recommends you cut down on carbohydrate intake.    He recommends a recheck of your Complete Metabolic Panel in a few months. A lab slip is attached. Please fast 8-10 hours prior to getting this lab work completed. Please also follow up with your primary doctor regarding your results.    We were unable to reach you via phone. If you have any questions, please give us a call at [phone].      Thank you,    Quiana RODRIGUEZ for Dr. Armstrong  Cardiology

## 2020-07-29 NOTE — TELEPHONE ENCOUNTER
Attempted to call pt again this morning, no answer, LM for her to call back. Pt does not appear active on [employer]. Letter drafted and mailed to pt.

## 2020-08-14 ENCOUNTER — TELEPHONE (OUTPATIENT)
Dept: CARDIOLOGY | Facility: MEDICAL CENTER | Age: 80
End: 2020-08-14

## 2020-08-14 ENCOUNTER — NON-PROVIDER VISIT (OUTPATIENT)
Dept: CARDIOLOGY | Facility: MEDICAL CENTER | Age: 80
End: 2020-08-14
Payer: MEDICARE

## 2020-08-14 DIAGNOSIS — I47.10 SVT (SUPRAVENTRICULAR TACHYCARDIA) (HCC): ICD-10-CM

## 2020-08-14 DIAGNOSIS — I48.0 PAF (PAROXYSMAL ATRIAL FIBRILLATION) (HCC): ICD-10-CM

## 2020-08-14 PROCEDURE — 93268 ECG RECORD/REVIEW: CPT | Performed by: INTERNAL MEDICINE

## 2020-08-14 NOTE — TELEPHONE ENCOUNTER
Patient enrolled in the 28 day Bio-Tel [facility] Heart monitoring program per DOMENICA Nuñez.(Nathaniel is patient's MD)  >In office hookup with Baseline transmitted.  >Pending EOS.

## 2020-08-27 ENCOUNTER — TELEPHONE (OUTPATIENT)
Dept: CARDIOLOGY | Facility: MEDICAL CENTER | Age: 80
End: 2020-08-27

## 2020-08-27 NOTE — TELEPHONE ENCOUNTER
"Called pt's son back to discuss. He confirms the message per . He believes pt has not been able to comply with both her medications and the recent BioTel monitor that was placed on 08/14/20. He states pt lives alone and has bee \"spacey lately\". The last time he spoke with her, she had complaints that the monitor \"wasn't working\". He also states he is unsure if she has a follow up or has been following up with her PCP.    Pt has a FV with CR tomorrow morning, confirmed time and location with Lonny. He will be accompanying pt to her appointment and advised him to assist pt with bringing in her list of medications and the heart monitor for clarification and further evaluation. It would be important for him to help her with the visit so she can remember her POC and recommendations. Verbalized understanding, appreciative of call back.  "

## 2020-08-28 ENCOUNTER — OFFICE VISIT (OUTPATIENT)
Dept: CARDIOLOGY | Facility: MEDICAL CENTER | Age: 80
End: 2020-08-28
Payer: MEDICARE

## 2020-08-28 VITALS
SYSTOLIC BLOOD PRESSURE: 100 MMHG | DIASTOLIC BLOOD PRESSURE: 62 MMHG | WEIGHT: 149 LBS | HEIGHT: 62 IN | HEART RATE: 66 BPM | BODY MASS INDEX: 27.42 KG/M2 | OXYGEN SATURATION: 98 %

## 2020-08-28 DIAGNOSIS — I48.0 PAF (PAROXYSMAL ATRIAL FIBRILLATION) (HCC): ICD-10-CM

## 2020-08-28 DIAGNOSIS — I25.10 CORONARY ARTERY DISEASE INVOLVING NATIVE CORONARY ARTERY OF NATIVE HEART WITHOUT ANGINA PECTORIS: ICD-10-CM

## 2020-08-28 DIAGNOSIS — I71.010 ASCENDING AORTIC DISSECTION (HCC): ICD-10-CM

## 2020-08-28 DIAGNOSIS — Z95.5 S/P RIGHT CORONARY ARTERY (RCA) STENT PLACEMENT: ICD-10-CM

## 2020-08-28 PROCEDURE — 99214 OFFICE O/P EST MOD 30 MIN: CPT | Performed by: INTERNAL MEDICINE

## 2020-08-28 ASSESSMENT — ENCOUNTER SYMPTOMS
BLOOD IN STOOL: 0
BRUISES/BLEEDS EASILY: 1
CARDIOVASCULAR NEGATIVE: 1
MYALGIAS: 0
INSOMNIA: 0
RESPIRATORY NEGATIVE: 1
BLURRED VISION: 0
DOUBLE VISION: 0
DIZZINESS: 0

## 2020-08-28 ASSESSMENT — FIBROSIS 4 INDEX: FIB4 SCORE: 1.828275852433814921

## 2020-08-28 NOTE — PROGRESS NOTES
Chief Complaint   Patient presents with   • Coronary Artery Disease     follow up   Status post right coronary stents  Paroxysmal atrial fibrillation  Anticoagulation    Subjective:   Alondra Otero is a 80 y.o. female who presents today for follow-up above issues    Her son contacted us yesterday stating that he may not be compliant with medication and probably was not using monitor properly.    S/p acute inferior STEMI secondary to total occlusion of distal right coronary artery with second-degree AV block Mobitz type I in 12/2019 with preserved overall LV systolic function  Status post stenting of the ostium and distal right coronary artery with 4 x 23 mm bare-metal stent and 2.25 x 18 mm Yale drug eluting stent respectively    She had atrial fibrillation during the hospital stay.  Used to complain of lack of energy and dizziness with amiodarone.    Blood pressure was low with 25 mg atenolol.  The dose was reduced to 12.5 mg daily  Reported feeling better after d/c amiodarone and lower dose of atenolol in 2/2020 1 LSR    She was last seen by one of our nurse practitioners slightly over a month ago she decided to stop taking Eliquis due to easy bruising.  Biotel was ordered. She is having difficulty following instruction and only wore it for 2 days so far.   She also has Smartwatch which she uses to check her rhythm twice daily.   She also has not been taking her atorvastatin  No recent lipid    Past Medical History:   Diagnosis Date   • Heart burn    • Hypertension      Past Surgical History:   Procedure Laterality Date   • RHYTIDECTOMY  10/15/2012    Performed by Nelsy Sun M.D. at SUR[facility]   • BLEPHAROPLASTY  10/15/2012    Performed by Nelsy Sun M.D. at [facility]   • CHOLECYSTECTOMY  2004   • HYSTERECTOMY LAPAROSCOPY  1975     History reviewed. No pertinent family history.  Social History     Socioeconomic History   • Marital status:      Spouse  name: Not on file   • Number of children: Not on file   • Years of education: Not on file   • Highest education level: Not on file   Occupational History   • Not on file   Social Needs   • Financial resource strain: Not on file   • Food insecurity     Worry: Not on file     Inability: Not on file   • Transportation needs     Medical: Not on file     Non-medical: Not on file   Tobacco Use   • Smoking status: Never Smoker   • Smokeless tobacco: Never Used   Substance and Sexual Activity   • Alcohol use: Yes     Comment: 1 per week   • Drug use: No   • Sexual activity: Not on file   Lifestyle   • Physical activity     Days per week: Not on file     Minutes per session: Not on file   • Stress: Not on file   Relationships   • Social connections     Talks on phone: Not on file     Gets together: Not on file     Attends Buddhism service: Not on file     Active member of club or organization: Not on file     Attends meetings of clubs or organizations: Not on file     Relationship status: Not on file   • Intimate partner violence     Fear of current or ex partner: Not on file     Emotionally abused: Not on file     Physically abused: Not on file     Forced sexual activity: Not on file   Other Topics Concern   • Not on file   Social History Narrative   • Not on file     Allergies   Allergen Reactions   • Sulfa Drugs Vomiting     Outpatient Encounter Medications as of 8/28/2020   Medication Sig Dispense Refill   • aspirin (ASA) 81 MG Chew Tab chewable tablet Take 1 Tab by mouth every day. 100 Tab 3   • atenolol (TENORMIN) 25 MG Tab Take 0.5 Tabs by mouth every evening. 90 Tab 3   • omeprazole (PRILOSEC) 20 MG delayed-release capsule Take 20 mg by mouth as needed.     • Melatonin 5 MG Tab Take 5 mg by mouth 1 time daily as needed.     • acetaminophen (TYLENOL) 500 MG Tab Take 500-1,000 mg by mouth as needed.     • atorvastatin (LIPITOR) 40 MG Tab Take 1 Tab by mouth every evening. (Patient not taking: Reported on 8/28/2020) 90  "Tab 3   • fexofenadine (ALLEGRA) 180 MG tablet Take 180 mg by mouth as needed.     • clopidogrel (PLAVIX) 75 MG Tab Take 1 Tab by mouth every day. (Patient not taking: Reported on 8/28/2020) 90 Tab 3     No facility-administered encounter medications on file as of 8/28/2020.      Review of Systems   Constitutional: Positive for malaise/fatigue.   HENT: Negative for nosebleeds.    Eyes: Negative for blurred vision and double vision.   Respiratory: Negative.    Cardiovascular: Negative.    Gastrointestinal: Negative for blood in stool.   Genitourinary: Negative for hematuria.   Musculoskeletal: Negative for myalgias.   Neurological: Negative for dizziness.   Endo/Heme/Allergies: Bruises/bleeds easily.   Psychiatric/Behavioral: The patient does not have insomnia.         Objective:   /62 (BP Location: Left arm, Patient Position: Sitting)   Pulse 66   Ht 1.575 m (5' 2\")   Wt 67.6 kg (149 lb)   SpO2 98%   BMI 27.25 kg/m²     Physical Exam   Constitutional: She is oriented to person, place, and time. She appears well-nourished.   Neck: No JVD present.   Cardiovascular: Normal rate and regular rhythm. Exam reveals no gallop.   No murmur heard.  Pulmonary/Chest: Effort normal and breath sounds normal. No respiratory distress. She has no wheezes. She has no rales.   Abdominal: Soft. She exhibits no distension. There is no abdominal tenderness.   Musculoskeletal:         General: No edema.   Neurological: She is alert and oriented to person, place, and time.   Skin: Skin is warm and dry. No erythema.   Psychiatric: She has a normal mood and affect. Her behavior is normal.       Assessment:     1. Coronary artery disease involving native coronary artery of native heart without angina pectoris     2. S/P right coronary artery (RCA) stent placement     3. PAF (paroxysmal atrial fibrillation) (HCC) one documented episode during acute MI no evidence of recurrence so far    4. Chronic anticoagulation     5. Ascending " aortic dissection (HCC)         Medical Decision Making:  Today's Assessment / Status / Plan:     Overall she is doing relatively well. I advised her to resume atorvastatin.  She is to continue baby ASA and atenolol  I think she should keep Biotel for now facially with her prior history of AV block and recent fatigue and past history of atrial fibrillation if allowable by insurance  RTC 6 months with CMP and lipids or sooner if monitor is abnormal.  We will keep you posted about our findings and further recommendations as they become available. Please also do not hesitate to call for any questions.  Thank you kindly for allowing me to participate in the care of this patient.

## 2020-09-01 LAB
ALBUMIN SERPL-MCNC: 3.5 G/DL (ref 3.6–4.6)
ALBUMIN/GLOB SERPL: 1.6 {RATIO} (ref 1.2–2.2)
ALP SERPL-CCNC: 100 IU/L (ref 39–117)
ALT SERPL-CCNC: 28 IU/L (ref 0–32)
AST SERPL-CCNC: 20 IU/L (ref 0–40)
BILIRUB SERPL-MCNC: 0.7 MG/DL (ref 0–1.2)
BUN SERPL-MCNC: 19 MG/DL (ref 8–27)
BUN/CREAT SERPL: 18 (ref 12–28)
CALCIUM SERPL-MCNC: 8.4 MG/DL (ref 8.7–10.3)
CHLORIDE SERPL-SCNC: 102 MMOL/L (ref 96–106)
CHOLEST SERPL-MCNC: 169 MG/DL (ref 100–199)
CO2 SERPL-SCNC: 21 MMOL/L (ref 20–29)
CREAT SERPL-MCNC: 1.04 MG/DL (ref 0.57–1)
GLOBULIN SER CALC-MCNC: 2.2 G/DL (ref 1.5–4.5)
GLUCOSE SERPL-MCNC: 84 MG/DL (ref 65–99)
HDLC SERPL-MCNC: 59 MG/DL
LABORATORY COMMENT REPORT: NORMAL
LDLC SERPL CALC-MCNC: 95 MG/DL (ref 0–99)
POTASSIUM SERPL-SCNC: 4.3 MMOL/L (ref 3.5–5.2)
PROT SERPL-MCNC: 5.7 G/DL (ref 6–8.5)
SODIUM SERPL-SCNC: 136 MMOL/L (ref 134–144)
TRIGL SERPL-MCNC: 80 MG/DL (ref 0–149)
VLDLC SERPL CALC-MCNC: 15 MG/DL (ref 5–40)

## 2020-09-11 ENCOUNTER — HOSPITAL ENCOUNTER (OUTPATIENT)
Facility: MEDICAL CENTER | Age: 80
End: 2020-09-12
Attending: EMERGENCY MEDICINE | Admitting: INTERNAL MEDICINE
Payer: MEDICARE

## 2020-09-11 ENCOUNTER — APPOINTMENT (OUTPATIENT)
Dept: RADIOLOGY | Facility: MEDICAL CENTER | Age: 80
End: 2020-09-11
Attending: EMERGENCY MEDICINE
Payer: MEDICARE

## 2020-09-11 DIAGNOSIS — Z20.822 SUSPECTED COVID-19 VIRUS INFECTION: ICD-10-CM

## 2020-09-11 DIAGNOSIS — R06.02 SOB (SHORTNESS OF BREATH): ICD-10-CM

## 2020-09-11 DIAGNOSIS — I50.9 CONGESTIVE HEART FAILURE, UNSPECIFIED HF CHRONICITY, UNSPECIFIED HEART FAILURE TYPE (HCC): ICD-10-CM

## 2020-09-11 PROBLEM — U07.1 COVID-19 VIRUS INFECTION: Status: ACTIVE | Noted: 2020-09-11

## 2020-09-11 PROBLEM — R79.89 ELEVATED BRAIN NATRIURETIC PEPTIDE (BNP) LEVEL: Status: ACTIVE | Noted: 2020-09-11

## 2020-09-11 PROBLEM — E87.6 HYPOKALEMIA: Status: ACTIVE | Noted: 2020-09-11

## 2020-09-11 LAB
ALBUMIN SERPL BCP-MCNC: 3.8 G/DL (ref 3.2–4.9)
ALBUMIN/GLOB SERPL: 1.2 G/DL
ALP SERPL-CCNC: 99 U/L (ref 30–99)
ALT SERPL-CCNC: 10 U/L (ref 2–50)
ANION GAP SERPL CALC-SCNC: 15 MMOL/L (ref 7–16)
APTT PPP: 22.8 SEC (ref 24.7–36)
AST SERPL-CCNC: 22 U/L (ref 12–45)
BASOPHILS # BLD AUTO: 0 % (ref 0–1.8)
BASOPHILS # BLD: 0 K/UL (ref 0–0.12)
BILIRUB SERPL-MCNC: 0.9 MG/DL (ref 0.1–1.5)
BUN SERPL-MCNC: 16 MG/DL (ref 8–22)
CALCIUM SERPL-MCNC: 9.3 MG/DL (ref 8.5–10.5)
CHLORIDE SERPL-SCNC: 97 MMOL/L (ref 96–112)
CK SERPL-CCNC: 77 U/L (ref 0–154)
CO2 SERPL-SCNC: 26 MMOL/L (ref 20–33)
COVID ORDER STATUS COVID19: NORMAL
CREAT SERPL-MCNC: 0.91 MG/DL (ref 0.5–1.4)
CRP SERPL HS-MCNC: 1.43 MG/DL (ref 0–0.75)
D DIMER PPP IA.FEU-MCNC: 0.96 UG/ML (FEU) (ref 0–0.5)
EKG IMPRESSION: NORMAL
EOSINOPHIL # BLD AUTO: 0.08 K/UL (ref 0–0.51)
EOSINOPHIL NFR BLD: 0.9 % (ref 0–6.9)
ERYTHROCYTE [DISTWIDTH] IN BLOOD BY AUTOMATED COUNT: 39 FL (ref 35.9–50)
FERRITIN SERPL-MCNC: 756 NG/ML (ref 10–291)
GLOBULIN SER CALC-MCNC: 3.2 G/DL (ref 1.9–3.5)
GLUCOSE SERPL-MCNC: 83 MG/DL (ref 65–99)
HCT VFR BLD AUTO: 40.3 % (ref 37–47)
HGB BLD-MCNC: 13.9 G/DL (ref 12–16)
INR PPP: 1.04 (ref 0.87–1.13)
LDH SERPL L TO P-CCNC: 242 U/L (ref 107–266)
LYMPHOCYTES # BLD AUTO: 1.6 K/UL (ref 1–4.8)
LYMPHOCYTES NFR BLD: 18.4 % (ref 22–41)
MAGNESIUM SERPL-MCNC: 1.7 MG/DL (ref 1.5–2.5)
MANUAL DIFF BLD: NORMAL
MCH RBC QN AUTO: 31 PG (ref 27–33)
MCHC RBC AUTO-ENTMCNC: 34.5 G/DL (ref 33.6–35)
MCV RBC AUTO: 90 FL (ref 81.4–97.8)
MONOCYTES # BLD AUTO: 0.99 K/UL (ref 0–0.85)
MONOCYTES NFR BLD AUTO: 11.4 % (ref 0–13.4)
MORPHOLOGY BLD-IMP: NORMAL
NEUTROPHILS # BLD AUTO: 6.03 K/UL (ref 2–7.15)
NEUTROPHILS NFR BLD: 69.3 % (ref 44–72)
NRBC # BLD AUTO: 0 K/UL
NRBC BLD-RTO: 0 /100 WBC
NT-PROBNP SERPL IA-MCNC: 2212 PG/ML (ref 0–125)
PLATELET # BLD AUTO: 260 K/UL (ref 164–446)
PLATELET BLD QL SMEAR: NORMAL
PMV BLD AUTO: 8.9 FL (ref 9–12.9)
POTASSIUM SERPL-SCNC: 3 MMOL/L (ref 3.6–5.5)
PROT SERPL-MCNC: 7 G/DL (ref 6–8.2)
PROTHROMBIN TIME: 14 SEC (ref 12–14.6)
RBC # BLD AUTO: 4.48 M/UL (ref 4.2–5.4)
RBC BLD AUTO: NORMAL
SARS-COV-2 RNA RESP QL NAA+PROBE: DETECTED
SODIUM SERPL-SCNC: 138 MMOL/L (ref 135–145)
SPECIMEN SOURCE: ABNORMAL
TROPONIN T SERPL-MCNC: 22 NG/L (ref 6–19)
WBC # BLD AUTO: 8.7 K/UL (ref 4.8–10.8)

## 2020-09-11 PROCEDURE — U0003 INFECTIOUS AGENT DETECTION BY NUCLEIC ACID (DNA OR RNA); SEVERE ACUTE RESPIRATORY SYNDROME CORONAVIRUS 2 (SARS-COV-2) (CORONAVIRUS DISEASE [COVID-19]), AMPLIFIED PROBE TECHNIQUE, MAKING USE OF HIGH THROUGHPUT TECHNOLOGIES AS DESCRIBED BY CMS-2020-01-R: HCPCS

## 2020-09-11 PROCEDURE — 82550 ASSAY OF CK (CPK): CPT

## 2020-09-11 PROCEDURE — 85379 FIBRIN DEGRADATION QUANT: CPT

## 2020-09-11 PROCEDURE — 85027 COMPLETE CBC AUTOMATED: CPT

## 2020-09-11 PROCEDURE — 93005 ELECTROCARDIOGRAM TRACING: CPT

## 2020-09-11 PROCEDURE — 700117 HCHG RX CONTRAST REV CODE 255: Performed by: EMERGENCY MEDICINE

## 2020-09-11 PROCEDURE — 80053 COMPREHEN METABOLIC PANEL: CPT

## 2020-09-11 PROCEDURE — 84484 ASSAY OF TROPONIN QUANT: CPT

## 2020-09-11 PROCEDURE — 82728 ASSAY OF FERRITIN: CPT

## 2020-09-11 PROCEDURE — 85610 PROTHROMBIN TIME: CPT

## 2020-09-11 PROCEDURE — 84145 PROCALCITONIN (PCT): CPT

## 2020-09-11 PROCEDURE — 85730 THROMBOPLASTIN TIME PARTIAL: CPT

## 2020-09-11 PROCEDURE — 71275 CT ANGIOGRAPHY CHEST: CPT

## 2020-09-11 PROCEDURE — 93005 ELECTROCARDIOGRAM TRACING: CPT | Performed by: EMERGENCY MEDICINE

## 2020-09-11 PROCEDURE — 83520 IMMUNOASSAY QUANT NOS NONAB: CPT

## 2020-09-11 PROCEDURE — 71045 X-RAY EXAM CHEST 1 VIEW: CPT

## 2020-09-11 PROCEDURE — G0378 HOSPITAL OBSERVATION PER HR: HCPCS

## 2020-09-11 PROCEDURE — 83735 ASSAY OF MAGNESIUM: CPT

## 2020-09-11 PROCEDURE — 83615 LACTATE (LD) (LDH) ENZYME: CPT

## 2020-09-11 PROCEDURE — 36415 COLL VENOUS BLD VENIPUNCTURE: CPT

## 2020-09-11 PROCEDURE — 99220 PR INITIAL OBSERVATION CARE,LEVL III: CPT | Mod: AI | Performed by: INTERNAL MEDICINE

## 2020-09-11 PROCEDURE — 86140 C-REACTIVE PROTEIN: CPT

## 2020-09-11 PROCEDURE — 85007 BL SMEAR W/DIFF WBC COUNT: CPT

## 2020-09-11 PROCEDURE — C9803 HOPD COVID-19 SPEC COLLECT: HCPCS | Performed by: EMERGENCY MEDICINE

## 2020-09-11 PROCEDURE — 99285 EMERGENCY DEPT VISIT HI MDM: CPT

## 2020-09-11 PROCEDURE — 83880 ASSAY OF NATRIURETIC PEPTIDE: CPT

## 2020-09-11 RX ORDER — ONDANSETRON 2 MG/ML
4 INJECTION INTRAMUSCULAR; INTRAVENOUS EVERY 6 HOURS PRN
Status: DISCONTINUED | OUTPATIENT
Start: 2020-09-11 | End: 2020-09-12 | Stop reason: HOSPADM

## 2020-09-11 RX ORDER — ATENOLOL 25 MG/1
12.5 TABLET ORAL EVERY EVENING
Status: DISCONTINUED | OUTPATIENT
Start: 2020-09-11 | End: 2020-09-12 | Stop reason: HOSPADM

## 2020-09-11 RX ORDER — OMEPRAZOLE 20 MG/1
20 CAPSULE, DELAYED RELEASE ORAL DAILY
Status: DISCONTINUED | OUTPATIENT
Start: 2020-09-12 | End: 2020-09-12 | Stop reason: HOSPADM

## 2020-09-11 RX ORDER — ACETAMINOPHEN 325 MG/1
650 TABLET ORAL EVERY 6 HOURS PRN
Status: DISCONTINUED | OUTPATIENT
Start: 2020-09-11 | End: 2020-09-12 | Stop reason: HOSPADM

## 2020-09-11 RX ORDER — ONDANSETRON 4 MG/1
4 TABLET, ORALLY DISINTEGRATING ORAL EVERY 6 HOURS PRN
Status: DISCONTINUED | OUTPATIENT
Start: 2020-09-11 | End: 2020-09-12 | Stop reason: HOSPADM

## 2020-09-11 RX ORDER — POTASSIUM CHLORIDE 20 MEQ/1
40 TABLET, EXTENDED RELEASE ORAL 2 TIMES DAILY
Status: COMPLETED | OUTPATIENT
Start: 2020-09-11 | End: 2020-09-12

## 2020-09-11 RX ORDER — LABETALOL HYDROCHLORIDE 5 MG/ML
10 INJECTION, SOLUTION INTRAVENOUS EVERY 4 HOURS PRN
Status: DISCONTINUED | OUTPATIENT
Start: 2020-09-11 | End: 2020-09-12 | Stop reason: HOSPADM

## 2020-09-11 RX ORDER — BISACODYL 10 MG
10 SUPPOSITORY, RECTAL RECTAL
Status: DISCONTINUED | OUTPATIENT
Start: 2020-09-11 | End: 2020-09-12 | Stop reason: HOSPADM

## 2020-09-11 RX ORDER — ASPIRIN 81 MG/1
81 TABLET, CHEWABLE ORAL DAILY
Status: DISCONTINUED | OUTPATIENT
Start: 2020-09-12 | End: 2020-09-12 | Stop reason: HOSPADM

## 2020-09-11 RX ORDER — ATORVASTATIN CALCIUM 40 MG/1
40 TABLET, FILM COATED ORAL EVERY EVENING
Status: DISCONTINUED | OUTPATIENT
Start: 2020-09-11 | End: 2020-09-12 | Stop reason: HOSPADM

## 2020-09-11 RX ORDER — CHOLECALCIFEROL (VITAMIN D3) 125 MCG
5 CAPSULE ORAL
Status: DISCONTINUED | OUTPATIENT
Start: 2020-09-11 | End: 2020-09-11

## 2020-09-11 RX ORDER — AMOXICILLIN 250 MG
2 CAPSULE ORAL 2 TIMES DAILY
Status: DISCONTINUED | OUTPATIENT
Start: 2020-09-11 | End: 2020-09-12 | Stop reason: HOSPADM

## 2020-09-11 RX ORDER — POLYETHYLENE GLYCOL 3350 17 G/17G
1 POWDER, FOR SOLUTION ORAL
Status: DISCONTINUED | OUTPATIENT
Start: 2020-09-11 | End: 2020-09-12 | Stop reason: HOSPADM

## 2020-09-11 RX ADMIN — IOHEXOL 60 ML: 350 INJECTION, SOLUTION INTRAVENOUS at 19:32

## 2020-09-11 ASSESSMENT — ENCOUNTER SYMPTOMS
FOCAL WEAKNESS: 0
PSYCHIATRIC NEGATIVE: 1
FEVER: 0
TINGLING: 0
MUSCULOSKELETAL NEGATIVE: 1
HEADACHES: 0
SPUTUM PRODUCTION: 1
PHOTOPHOBIA: 0
ABDOMINAL PAIN: 0
DIARRHEA: 0
PALPITATIONS: 0
HALLUCINATIONS: 0
SPEECH CHANGE: 0
WEIGHT LOSS: 0
SPUTUM PRODUCTION: 0
ORTHOPNEA: 0
CLAUDICATION: 0
NEUROLOGICAL NEGATIVE: 1
DOUBLE VISION: 0
CHILLS: 0
EYE PAIN: 0
HEMOPTYSIS: 0
SENSORY CHANGE: 0
PND: 0
CONSTIPATION: 0
CONSTITUTIONAL NEGATIVE: 1
NECK PAIN: 0
COUGH: 1
TREMORS: 0
GASTROINTESTINAL NEGATIVE: 1
NAUSEA: 0
BLURRED VISION: 0
SHORTNESS OF BREATH: 1
MYALGIAS: 0
EYES NEGATIVE: 1
WHEEZING: 0
BACK PAIN: 0
VOMITING: 0
DIZZINESS: 0

## 2020-09-11 ASSESSMENT — FIBROSIS 4 INDEX: FIB4 SCORE: 1.259881576697424091

## 2020-09-11 ASSESSMENT — LIFESTYLE VARIABLES: SUBSTANCE_ABUSE: 0

## 2020-09-11 NOTE — ED PROVIDER NOTES
ED Provider Note    Scribed for Krishan Kellogg M.D. by Molly Sims. 9/11/2020, 2:59 PM.    Primary care provider: Jaime Merino M.D.  Means of arrival: Walk-In  History obtained from: Patient  History limited by: None    CHIEF COMPLAINT  Chief Complaint   Patient presents with   • Shortness of Breath     started 2 wks ago, started to improved but than continues, been working with PCP; dx with bladder infection by PCP pt continues on abx for the next 3-5 days, pt medications have been changed and she hasn't been on some medications for the last 2 wks.         STEPHEN Otero is a 80 y.o. female who presents to the Emergency Department for evaluation of shortness of breath onset three weeks ago. She thought she was improving yesterday, but this morning, all her symptoms returned, causing her to present to the ED today. Patient has associated chest pain. Denies any rhinorrhea, sore throat, or fever. Patient's pain is exacerbated with exertion. Symptoms are alleviated with rest. Patient reports she had a heart attack this previous December. She states that she has been doing well after her heart attack, exercising often and not having any major medical problems, but then became short of breath recently. Patient was previously on Eliquis, but is not on the medication anymore.     REVIEW OF SYSTEMS  Review of Systems   Constitutional: Negative.    HENT: Negative.    Eyes: Negative.    Respiratory: Positive for cough and shortness of breath. Negative for hemoptysis, sputum production and wheezing.    Cardiovascular: Positive for chest pain. Negative for palpitations, orthopnea, claudication, leg swelling and PND.   Gastrointestinal: Negative.    Genitourinary: Negative.    Musculoskeletal: Negative.    Skin: Negative.    Neurological: Negative.    Endo/Heme/Allergies: Negative.    Psychiatric/Behavioral: Negative.    All other systems reviewed and are negative.      PAST MEDICAL HISTORY   has a past  "medical history of Heart burn and Hypertension.    SURGICAL HISTORY   has a past surgical history that includes hysterectomy laparoscopy (1975); cholecystectomy (2004); rhytidectomy (10/15/2012); and blepharoplasty (10/15/2012).    SOCIAL HISTORY  Social History     Tobacco Use   • Smoking status: Never Smoker   • Smokeless tobacco: Never Used   Substance Use Topics   • Alcohol use: Yes     Comment: 1 per week   • Drug use: No      Social History     Substance and Sexual Activity   Drug Use No       FAMILY HISTORY  No family history on file.    CURRENT MEDICATIONS  Home Medications     Reviewed by Henny Knox R.N. (Registered Nurse) on 09/11/20 at 1218  Med List Status: Partial   Medication Last Dose Status   acetaminophen (TYLENOL) 500 MG Tab  Active   aspirin (ASA) 81 MG Chew Tab chewable tablet 9/10/2020 Active   atenolol (TENORMIN) 25 MG Tab 9/10/2020 Active   atorvastatin (LIPITOR) 40 MG Tab 9/10/2020 Active   fexofenadine (ALLEGRA) 180 MG tablet prn Active   Melatonin 5 MG Tab prn Active   omeprazole (PRILOSEC) 20 MG delayed-release capsule prn Active                ALLERGIES  Allergies   Allergen Reactions   • Sulfa Drugs Vomiting       PHYSICAL EXAM  VITAL SIGNS: /77   Pulse 93   Temp 36.4 °C (97.5 °F) (Temporal)   Resp 16   Ht 1.575 m (5' 2\")   Wt 63.2 kg (139 lb 5.3 oz)   SpO2 93%   BMI 25.48 kg/m²     Constitutional:  Mild distress  HENT: Moist mucous membranes  Eyes: No conjunctivitis or icterus  Neck: No JVD, trachea is midline, no palpable thyroid  Cardiovascular:  Regular rate and rhythm, no murmurs  Thorax & Lungs: Normal breath sounds, no rhonchi  Abdomen: Soft, Non-tender  Back: Non-tender, no CVA tenderness  Extremities:  no edema  Neurologic: Normal gross motor    LABS  Labs Reviewed   CBC WITH DIFFERENTIAL - Abnormal; Notable for the following components:       Result Value    MPV 8.9 (*)     Lymphocytes 18.40 (*)     Monos (Absolute) 0.99 (*)     All other components within " normal limits   COMP METABOLIC PANEL - Abnormal; Notable for the following components:    Potassium 3.0 (*)     All other components within normal limits   TROPONIN - Abnormal; Notable for the following components:    Troponin T 22 (*)     All other components within normal limits   PROBRAIN NATRIURETIC PEPTIDE, NT - Abnormal; Notable for the following components:    NT-proBNP 2212 (*)     All other components within normal limits   SARS-COV-2, PCR (IN-HOUSE) - Abnormal; Notable for the following components:    SARS-CoV-2 by PCR DETECTED (*)     All other components within normal limits    Narrative:     Droplet, Contact, and Eye Protection  Rule-out COVID-19 panel, includes droplet/contact/eye  isolation order.  Check influenza to order this test only if  specifically indicated.  Is patient being admitted?->Yes  Does this patient meet criteria for Rush/Cepheid per [facility]  Inpatient Workflow? (See workflow link below)->Yes  Expected turn around time?->Rush (Cepheid 2-4 hours)  Is this the patients First SARS CoV-2 test?->Unknown  Is this patient employed in healthcare?->Unknown  Is the patient symptomatic as defined by the CDC?->Unknown  Is the patient hospitalized?->Unknown  Is the patient a resident in a congregate care  setting?->Unknown  Is the patient pregnant?->Unknown   ESTIMATED GFR - Abnormal; Notable for the following components:    GFR If Non  59 (*)     All other components within normal limits   COVID/SARS COV-2    Narrative:     Droplet, Contact, and Eye Protection  Rule-out COVID-19 panel, includes droplet/contact/eye  isolation order.  Check influenza to order this test only if  specifically indicated.  Is patient being admitted?->Yes  Does this patient meet criteria for Rush/Cepheid per [facility]  Inpatient Workflow? (See workflow link below)->Yes  Expected turn around time?->Rush (Cepheid 2-4 hours)  Is this the patients First SARS CoV-2 test?->Unknown  Is this patient employed in  healthcare?->Unknown  Is the patient symptomatic as defined by the CDC?->Unknown  Is the patient hospitalized?->Unknown  Is the patient a resident in a congregate care  setting?->Unknown  Is the patient pregnant?->Unknown   DIFFERENTIAL MANUAL   PERIPHERAL SMEAR REVIEW   PLATELET ESTIMATE   MORPHOLOGY     All labs reviewed by me.      RADIOLOGY  DX-CHEST-PORTABLE (1 VIEW)   Final Result      1.  RIGHT mid lung opacity could be pneumonia or atelectasis. Underlying nodule not excluded. Recommend follow-up radiograph to confirm clearing   2.  Mild LEFT basilar atelectasis, less likely pneumonia      CT-CTA CHEST PULMONARY ARTERY W/ RECONS    (Results Pending)     The radiologist's interpretation of all radiological studies have been reviewed by me.    COURSE & MEDICAL DECISION MAKING  Pertinent Labs & Imaging studies reviewed. (See chart for details)    2:59 PM - Patient seen and examined at bedside. I informed the patient of my plan of care, which includes obtaining lab work and imaging for further evaluation. Ordered DX-chest, COVID/SARS, Troponin, proBrain, CBC with diff, EKG, and CMP to evaluate her symptoms. The differential diagnoses include but are not limited to: pneumonia vs pulmonary embolism vs heart failure     3:30 PM - Patient was reevaluated at bedside. Ordered Estimated GFR, Peripheral smear, morphology, and differential manual for further evaluation.         PPE Note: I personally donned full PPE for all patient encounters during this visit, including being clean-shaven with an N95 respirator mask, gloves, and goggles.     Scribe remained outside the patient's room and did not have any contact with the patient for the duration of patient encounter.     Medical Decision Making:   The patient presented with progressive shortness of breath with some orthopnea and dyspnea on exertion.  She had an x-ray that was suspicious for some infiltrate.  She had a markedly elevated brain natruretic peptide and a  positive COVID test.  Was elected to do a CTA of the chest which was done which is suspicious for COVID pneumonia.  I did contact the hospitalist the patient is going to be admitted for further treatment and evaluation        FINAL IMPRESSION  1. Suspected COVID-19 virus infection    2. Congestive heart failure, unspecified HF chronicity, unspecified heart failure type (HCC)    3. SOB (shortness of breath)          IMolly (Scribe), am scribing for, and in the presence of, Krishan Kellogg M.D..    Electronically signed by: Molly Sims (Scribe), 9/11/2020    IKrishan M.D. personally performed the services described in this documentation, as scribed by Molly Sims in my presence, and it is both accurate and complete.    C    The note accurately reflects work and decisions made by me.  Krishan Kellogg M.D.  9/11/2020  8:49 PM

## 2020-09-11 NOTE — LETTER
9/15/2020               Elvie Otero  [address] [place] [address]        Dear Elvie (MR#[id number]),      This letter is to inform you that your COVID-19 test result is POSITIVE.  This means that the virus that causes COVID-19 was found in your sample.      A review of your test during your recent visit requires that we notify you of the following:    Your nasal swab was POSITIVE for the novel coronavirus (COVID-19).     The Health Department will be in contact with you soon.      You are encouraged to continue to quarantine and self-isolate according to the CDC guidance unless otherwise directed by the Health Department.  Per the CDC, you should continue to quarantine until: At least 3 days (72 hours) have passed since your fever resolved without the use of fever-reducing medications and you had improvement in your cough and shortness of breath.  You should also remain quarantined until at least 10 days have passed since your symptoms first appeared.    Once any symptoms have resolved, it may be possible to donate plasma to help others that are currently ill with COVID-19. To learn more and apply, please contact the  at [phone] or via e-mail at [email or web address].    For any further questions regarding COVID-19, please contact the [facility] at [phone].  Thank you for your cooperation in the matter.      Sincerely,      The [facility] Care Team

## 2020-09-11 NOTE — ED TRIAGE NOTES
Pt ambulated to triage with   Chief Complaint   Patient presents with   • Shortness of Breath     started 2 wks ago, started to improved but than continues, been working with PCP; dx with bladder infection by PCP pt continues on abx for the next 3-5 days, pt medications have been changed and she hasn't been on some medications for the last 2 wks.       Pt son here with pt.  Pt seen cardiologist also - last seen 3 wks ago, pt reports SOB at that time too. Pt Informed regarding triage process and verbalized understanding to inform triage tech or RN for any changes in condition. Placed in lobby.

## 2020-09-11 NOTE — ED NOTES
Pt brought back to rm GR 37 from triage. Pt able to transfer self to bed, pt in gown, on monitor, family at bedside, call light in reach. Chart up for ERP.

## 2020-09-12 ENCOUNTER — APPOINTMENT (OUTPATIENT)
Dept: CARDIOLOGY | Facility: MEDICAL CENTER | Age: 80
End: 2020-09-12
Attending: INTERNAL MEDICINE
Payer: MEDICARE

## 2020-09-12 VITALS
WEIGHT: 138.45 LBS | BODY MASS INDEX: 25.48 KG/M2 | SYSTOLIC BLOOD PRESSURE: 133 MMHG | RESPIRATION RATE: 16 BRPM | HEIGHT: 62 IN | HEART RATE: 79 BPM | OXYGEN SATURATION: 93 % | TEMPERATURE: 97.7 F | DIASTOLIC BLOOD PRESSURE: 69 MMHG

## 2020-09-12 LAB
LV EJECT FRACT  99904: 70
LV EJECT FRACT MOD 2C 99903: 73.87
LV EJECT FRACT MOD 4C 99902: 51.87
LV EJECT FRACT MOD BP 99901: 63.52
PROCALCITONIN SERPL-MCNC: <0.05 NG/ML

## 2020-09-12 PROCEDURE — G0378 HOSPITAL OBSERVATION PER HR: HCPCS

## 2020-09-12 PROCEDURE — 96372 THER/PROPH/DIAG INJ SC/IM: CPT

## 2020-09-12 PROCEDURE — 700102 HCHG RX REV CODE 250 W/ 637 OVERRIDE(OP): Performed by: INTERNAL MEDICINE

## 2020-09-12 PROCEDURE — 93306 TTE W/DOPPLER COMPLETE: CPT

## 2020-09-12 PROCEDURE — 93306 TTE W/DOPPLER COMPLETE: CPT | Mod: 26 | Performed by: INTERNAL MEDICINE

## 2020-09-12 PROCEDURE — 700111 HCHG RX REV CODE 636 W/ 250 OVERRIDE (IP): Performed by: INTERNAL MEDICINE

## 2020-09-12 PROCEDURE — A9270 NON-COVERED ITEM OR SERVICE: HCPCS | Performed by: INTERNAL MEDICINE

## 2020-09-12 PROCEDURE — 99217 PR OBSERVATION CARE DISCHARGE: CPT | Performed by: STUDENT IN AN ORGANIZED HEALTH CARE EDUCATION/TRAINING PROGRAM

## 2020-09-12 RX ADMIN — ENOXAPARIN SODIUM 40 MG: 40 INJECTION SUBCUTANEOUS at 06:35

## 2020-09-12 RX ADMIN — ASPIRIN 81 MG: 81 TABLET, CHEWABLE ORAL at 06:35

## 2020-09-12 RX ADMIN — POTASSIUM CHLORIDE 40 MEQ: 1500 TABLET, EXTENDED RELEASE ORAL at 00:29

## 2020-09-12 RX ADMIN — POTASSIUM CHLORIDE 40 MEQ: 1500 TABLET, EXTENDED RELEASE ORAL at 06:35

## 2020-09-12 RX ADMIN — ATORVASTATIN CALCIUM 40 MG: 40 TABLET, FILM COATED ORAL at 00:29

## 2020-09-12 RX ADMIN — OMEPRAZOLE 20 MG: 20 CAPSULE, DELAYED RELEASE ORAL at 06:35

## 2020-09-12 RX ADMIN — ATENOLOL 12.5 MG: 25 TABLET ORAL at 00:30

## 2020-09-12 SDOH — SOCIAL STABILITY: SOCIAL INSECURITY: WITHIN THE LAST YEAR, HAVE YOU BEEN AFRAID OF YOUR PARTNER OR EX-PARTNER?: NO

## 2020-09-12 SDOH — SOCIAL STABILITY: SOCIAL NETWORK: ARE YOU MARRIED, WIDOWED, DIVORCED, SEPARATED, NEVER MARRIED, OR LIVING WITH A PARTNER?: DIVORCED

## 2020-09-12 SDOH — SOCIAL STABILITY: SOCIAL NETWORK: HOW OFTEN DO YOU GET TOGETHER WITH FRIENDS OR RELATIVES?: THREE TIMES A WEEK

## 2020-09-12 SDOH — SOCIAL STABILITY: SOCIAL INSECURITY
WITHIN THE LAST YEAR, HAVE YOU BEEN KICKED, HIT, SLAPPED, OR OTHERWISE PHYSICALLY HURT BY YOUR PARTNER OR EX-PARTNER?: NO

## 2020-09-12 SDOH — HEALTH STABILITY: MENTAL HEALTH
STRESS IS WHEN SOMEONE FEELS TENSE, NERVOUS, ANXIOUS, OR CAN'T SLEEP AT NIGHT BECAUSE THEIR MIND IS TROUBLED. HOW STRESSED ARE YOU?: NOT AT ALL

## 2020-09-12 SDOH — SOCIAL STABILITY: SOCIAL NETWORK
DO YOU BELONG TO ANY CLUBS OR ORGANIZATIONS SUCH AS CHURCH GROUPS UNIONS, FRATERNAL OR ATHLETIC GROUPS, OR SCHOOL GROUPS?: NO

## 2020-09-12 SDOH — SOCIAL STABILITY: SOCIAL INSECURITY: WITHIN THE LAST YEAR, HAVE YOU BEEN HUMILIATED OR EMOTIONALLY ABUSED IN OTHER WAYS BY YOUR PARTNER OR EX-PARTNER?: NO

## 2020-09-12 SDOH — ECONOMIC STABILITY: TRANSPORTATION INSECURITY
IN THE PAST 12 MONTHS, HAS LACK OF TRANSPORTATION KEPT YOU FROM MEETINGS, WORK, OR FROM GETTING THINGS NEEDED FOR DAILY LIVING?: NO

## 2020-09-12 SDOH — ECONOMIC STABILITY: FOOD INSECURITY: WITHIN THE PAST 12 MONTHS, YOU WORRIED THAT YOUR FOOD WOULD RUN OUT BEFORE YOU GOT MONEY TO BUY MORE.: NEVER TRUE

## 2020-09-12 SDOH — HEALTH STABILITY: PHYSICAL HEALTH: ON AVERAGE, HOW MANY MINUTES DO YOU ENGAGE IN EXERCISE AT THIS LEVEL?: 0 MIN

## 2020-09-12 SDOH — SOCIAL STABILITY: SOCIAL INSECURITY
WITHIN THE LAST YEAR, HAVE TO BEEN RAPED OR FORCED TO HAVE ANY KIND OF SEXUAL ACTIVITY BY YOUR PARTNER OR EX-PARTNER?: NO

## 2020-09-12 SDOH — SOCIAL STABILITY: SOCIAL NETWORK: HOW OFTEN DO YOU ATTENT MEETINGS OF THE CLUB OR ORGANIZATION YOU BELONG TO?: NOT ASKED

## 2020-09-12 SDOH — ECONOMIC STABILITY: FOOD INSECURITY: WITHIN THE PAST 12 MONTHS, THE FOOD YOU BOUGHT JUST DIDN'T LAST AND YOU DIDN'T HAVE MONEY TO GET MORE.: NEVER TRUE

## 2020-09-12 SDOH — ECONOMIC STABILITY: INCOME INSECURITY: HOW HARD IS IT FOR YOU TO PAY FOR THE VERY BASICS LIKE FOOD, HOUSING, MEDICAL CARE, AND HEATING?: NOT HARD AT ALL

## 2020-09-12 SDOH — ECONOMIC STABILITY: TRANSPORTATION INSECURITY
IN THE PAST 12 MONTHS, HAS THE LACK OF TRANSPORTATION KEPT YOU FROM MEDICAL APPOINTMENTS OR FROM GETTING MEDICATIONS?: NO

## 2020-09-12 SDOH — SOCIAL STABILITY: SOCIAL NETWORK: HOW OFTEN DO YOU ATTEND CHURCH OR RELIGIOUS SERVICES?: 1 TO 4 TIMES PER YEAR

## 2020-09-12 SDOH — HEALTH STABILITY: PHYSICAL HEALTH: ON AVERAGE, HOW MANY DAYS PER WEEK DO YOU ENGAGE IN MODERATE TO STRENUOUS EXERCISE (LIKE A BRISK WALK)?: 0 DAYS

## 2020-09-12 SDOH — SOCIAL STABILITY: SOCIAL NETWORK: IN A TYPICAL WEEK, HOW MANY TIMES DO YOU TALK ON THE PHONE WITH FAMILY, FRIENDS, OR NEIGHBORS?: THREE TIMES A WEEK

## 2020-09-12 ASSESSMENT — LIFESTYLE VARIABLES
HAVE YOU EVER FELT YOU SHOULD CUT DOWN ON YOUR DRINKING: NO
TOTAL SCORE: 0
HAVE PEOPLE ANNOYED YOU BY CRITICIZING YOUR DRINKING: NO
EVER HAD A DRINK FIRST THING IN THE MORNING TO STEADY YOUR NERVES TO GET RID OF A HANGOVER: NO
CONSUMPTION TOTAL: NEGATIVE
DOES PATIENT WANT TO STOP DRINKING: NO
EVER FELT BAD OR GUILTY ABOUT YOUR DRINKING: NO
TOTAL SCORE: 0
AVERAGE NUMBER OF DAYS PER WEEK YOU HAVE A DRINK CONTAINING ALCOHOL: 0
ON A TYPICAL DAY WHEN YOU DRINK ALCOHOL HOW MANY DRINKS DO YOU HAVE: 0
ALCOHOL_USE: YES
HOW MANY TIMES IN THE PAST YEAR HAVE YOU HAD 5 OR MORE DRINKS IN A DAY: 0
TOTAL SCORE: 0

## 2020-09-12 ASSESSMENT — FIBROSIS 4 INDEX
FIB4 SCORE: 2.19

## 2020-09-12 ASSESSMENT — COGNITIVE AND FUNCTIONAL STATUS - GENERAL
DRESSING REGULAR LOWER BODY CLOTHING: A LITTLE
HELP NEEDED FOR BATHING: A LITTLE
STANDING UP FROM CHAIR USING ARMS: A LITTLE
MOBILITY SCORE: 20
MOVING FROM LYING ON BACK TO SITTING ON SIDE OF FLAT BED: A LITTLE
SUGGESTED CMS G CODE MODIFIER DAILY ACTIVITY: CJ
DAILY ACTIVITIY SCORE: 22
SUGGESTED CMS G CODE MODIFIER MOBILITY: CJ
WALKING IN HOSPITAL ROOM: A LITTLE
CLIMB 3 TO 5 STEPS WITH RAILING: A LITTLE

## 2020-09-12 ASSESSMENT — PATIENT HEALTH QUESTIONNAIRE - PHQ9
SUM OF ALL RESPONSES TO PHQ9 QUESTIONS 1 AND 2: 0
2. FEELING DOWN, DEPRESSED, IRRITABLE, OR HOPELESS: NOT AT ALL
1. LITTLE INTEREST OR PLEASURE IN DOING THINGS: NOT AT ALL

## 2020-09-12 ASSESSMENT — PAIN DESCRIPTION - PAIN TYPE: TYPE: ACUTE PAIN

## 2020-09-12 NOTE — H&P
Hospital Medicine History & Physical Note    Date of Service  9/11/2020    Primary Care Physician  Jaime Merino M.D.    Consultants    None  Code Status  Full Code    I discussed CODE STATUS with her and she would like to be full code    Chief Complaint  Chief Complaint   Patient presents with   • Shortness of Breath     started 2 wks ago, started to improved but than continues, been working with PCP; dx with bladder infection by PCP pt continues on abx for the next 3-5 days, pt medications have been changed and she hasn't been on some medications for the last 2 wks.         History of Presenting Illness    80 y.o. female with past medical history of coronary artery disease who presented to the hospital on 9/11/2020 with complaint of shortness of breath for 3 weeks.  She reported she has not been feeling well for 3 weeks and she has been having shortness of breath that has been progressively getting worse.  She received antibiotic prescribed by her primary care provider.  She reported she feels significantly weak and slight exertion make her symptoms of shortness of breath significantly worse.  There is no specific alleviating factors.  She reported mild cough and very small amount of phlegm production.  She denies any known COVID-19 exposure.  She denies any symptoms of fever.  She denies any symptoms of recent travel.  She denies any bilateral lower extremity edema.  She has history of coronary artery disease and she follows cardiology.  She denies any symptoms of chest pain, nausea, vomiting, diarrhea, constipation and abdominal pain.    ER course: She underwent CTA pulmonary it did not show pulmonary embolism and she found to have findings of possible COVID-19.  She underwent COVID-19 testing and it came back positive.    I discussed about this admission with ER physician Dr. Kellogg    Review of Systems  Review of Systems   Constitutional: Positive for malaise/fatigue. Negative for chills, fever and  weight loss.   HENT: Negative for hearing loss and tinnitus.    Eyes: Negative for blurred vision, double vision, photophobia and pain.   Respiratory: Positive for cough (Mild), sputum production (Minimal) and shortness of breath.    Cardiovascular: Negative for chest pain, palpitations, orthopnea and leg swelling.   Gastrointestinal: Negative for abdominal pain, constipation, diarrhea, nausea and vomiting.   Genitourinary: Negative for dysuria, frequency and urgency.   Musculoskeletal: Negative for back pain, joint pain, myalgias and neck pain.   Skin: Negative for rash.   Neurological: Negative for dizziness, tingling, tremors, sensory change, speech change, focal weakness and headaches.   Psychiatric/Behavioral: Negative for hallucinations and substance abuse.   All other systems reviewed and are negative.      Past Medical History   has a past medical history of Heart burn and Hypertension.    Surgical History   has a past surgical history that includes hysterectomy laparoscopy (1975); cholecystectomy (2004); rhytidectomy (10/15/2012); and blepharoplasty (10/15/2012).     Family History  I reviewed family stay with her she denies any history of chronic medical condition in her family.    Social History   reports that she has never smoked. She has never used smokeless tobacco. She reports current alcohol use. She reports that she does not use drugs.    Allergies  Allergies   Allergen Reactions   • Sulfa Drugs Vomiting       Medications  Prior to Admission Medications   Prescriptions Last Dose Informant Patient Reported? Taking?   Melatonin 5 MG Tab prn  Yes No   Sig: Take 5 mg by mouth 1 time daily as needed.   acetaminophen (TYLENOL) 500 MG Tab   Yes No   Sig: Take 500-1,000 mg by mouth as needed.   aspirin (ASA) 81 MG Chew Tab chewable tablet 9/10/2020 at Unknown time  No No   Sig: Take 1 Tab by mouth every day.   atenolol (TENORMIN) 25 MG Tab 9/10/2020 at Unknown time  No No   Sig: Take 0.5 Tabs by mouth every  evening.   atorvastatin (LIPITOR) 40 MG Tab 9/10/2020 at Unknown time  No No   Sig: Take 1 Tab by mouth every evening.   fexofenadine (ALLEGRA) 180 MG tablet prn  Yes No   Sig: Take 180 mg by mouth as needed.   omeprazole (PRILOSEC) 20 MG delayed-release capsule prn  Yes No   Sig: Take 20 mg by mouth as needed.      Facility-Administered Medications: None       Physical Exam  Temp:  [36.4 °C (97.5 °F)] 36.4 °C (97.5 °F)  Pulse:  [70-93] 80  Resp:  [16-24] 24  BP: (126-154)/(74-91) 154/74  SpO2:  [93 %-96 %] 96 %    Physical Exam  Vitals signs reviewed.   Constitutional:       General: She is not in acute distress.     Appearance: Normal appearance. She is not ill-appearing.   HENT:      Head: Normocephalic and atraumatic.      Nose: No congestion.   Eyes:      General:         Right eye: No discharge.         Left eye: No discharge.      Pupils: Pupils are equal, round, and reactive to light.   Neck:      Musculoskeletal: Normal range of motion. No neck rigidity.   Cardiovascular:      Rate and Rhythm: Normal rate and regular rhythm.      Pulses: Normal pulses.      Heart sounds: Normal heart sounds. No murmur.   Pulmonary:      Effort: Pulmonary effort is normal. No respiratory distress.      Breath sounds: Normal breath sounds. No stridor.   Abdominal:      General: Bowel sounds are normal. There is no distension.      Palpations: Abdomen is soft.      Tenderness: There is no abdominal tenderness.   Musculoskeletal: Normal range of motion.         General: No swelling or tenderness.   Skin:     General: Skin is warm.      Capillary Refill: Capillary refill takes less than 2 seconds.      Coloration: Skin is not jaundiced or pale.      Findings: No bruising.   Neurological:      General: No focal deficit present.      Mental Status: She is alert and oriented to person, place, and time.      Cranial Nerves: No cranial nerve deficit.      Comments: No focal neurological deficit  Motor 5/5 in all 4 extremities.    Psychiatric:         Mood and Affect: Mood normal.         Behavior: Behavior normal.         Laboratory:  Recent Labs     09/11/20  1530   WBC 8.7   RBC 4.48   HEMOGLOBIN 13.9   HEMATOCRIT 40.3   MCV 90.0   MCH 31.0   MCHC 34.5   RDW 39.0   PLATELETCT 260   MPV 8.9*     Recent Labs     09/11/20  1530   SODIUM 138   POTASSIUM 3.0*   CHLORIDE 97   CO2 26   GLUCOSE 83   BUN 16   CREATININE 0.91   CALCIUM 9.3     Recent Labs     09/11/20  1530   ALTSGPT 10   ASTSGOT 22   ALKPHOSPHAT 99   TBILIRUBIN 0.9   GLUCOSE 83         Recent Labs     09/11/20  1530   NTPROBNP 2212*         Recent Labs     09/11/20  1530   TROPONINT 22*       Imaging:  CT-CTA CHEST PULMONARY ARTERY W/ RECONS   Final Result      1.  No evidence of pulmonary embolus.      2.  Multiple bilateral ill-defined groundglass pulmonary infiltrates. Commonly reported imaging features of COVID-19 pneumonia are present. Other processes such as other infectious pneumonias, drug toxicity or connective tissue disease can cause a    similar imaging pattern.      3.  Borderline enlarged mediastinal and hilar lymph nodes.      DX-CHEST-PORTABLE (1 VIEW)   Final Result      1.  RIGHT mid lung opacity could be pneumonia or atelectasis. Underlying nodule not excluded. Recommend follow-up radiograph to confirm clearing   2.  Mild LEFT basilar atelectasis, less likely pneumonia            Assessment/Plan:  I anticipate this patient is appropriate for observation status at this time.    COVID-19 virus infection  Assessment & Plan  She presented with complaint of shortness of breath and she underwent CTA pulmonary and she found to have multiple bilateral ill-defined groundglass pulmonary infiltrates.  She underwent COVID-19 testing and it came back positive.  I ordered COVID-19 admission order set and ordered inflammatory markers which include ferritin, CRP and d-dimer  She may need full anticoagulation depending upon d-dimer results.  Droplet, contact and eye protection  precautions  I discussed plan of care with her and discussed finding of CTA and COVID-19 test results with her.    Hypokalemia  Assessment & Plan  She found to have hypokalemia.  I started her on potassium replacement.  Continue to monitor and replace as needed.    Elevated brain natriuretic peptide (BNP) level  Assessment & Plan  She presented with complaint of shortness of breath and found to have elevated BNP  She does not have bilateral lower extremities edema.  I ordered echocardiogram to evaluate it further  Admit to telemetry.      S/P right coronary artery (RCA) stent placement- (present on admission)  Assessment & Plan  Continue outpatient statin and atenolol  No complaints of chest pain.  Minimal elevation of troponin.

## 2020-09-12 NOTE — ED NOTES
Patient up to the bedside commode with steady gait.  Patient updated on plan of care, waiting for read on scan.  Patient reporting that she would like to go home and feels comfortable going home, will address after imaging has resulted.  Patient verbalizes understanding.  Family at bedside

## 2020-09-12 NOTE — ASSESSMENT & PLAN NOTE
Continue outpatient statin and atenolol  No complaints of chest pain.  Minimal elevation of troponin.

## 2020-09-12 NOTE — CARE PLAN
Assumed day shift care at start of shift  Patient a+o x 4 - denies pain  Denies sob/cough  +fatigue  Monitored on telemetry, vss, afebrile  Patient requesting to be discharged today - Dr. Vivas aware - presently waiting for d/c order  Son updated    GOAL: discharge     Fall precautions/hourly rounding maintained, call light within reach and functioning, all items within reach.  Patient encouraged to call for assistance, poc reviewed with patient, ?'s/concerns answered.   Bed alarm active    Problem: Discharge Barriers/Planning  Goal: Patient's continuum of care needs will be met  Outcome: PROGRESSING AS EXPECTED     Problem: Respiratory:  Goal: Respiratory status will improve  Outcome: PROGRESSING AS EXPECTED     Problem: Pain Management  Goal: Pain level will decrease to patient's comfort goal  Outcome: PROGRESSING AS EXPECTED

## 2020-09-12 NOTE — DISCHARGE SUMMARY
Discharge Summary    CHIEF COMPLAINT ON ADMISSION  Chief Complaint   Patient presents with   • Shortness of Breath     started 2 wks ago, started to improved but than continues, been working with PCP; dx with bladder infection by PCP pt continues on abx for the next 3-5 days, pt medications have been changed and she hasn't been on some medications for the last 2 wks.         Reason for Admission  SOB     Admission Date  9/11/2020    CODE STATUS  Full Code    HPI & HOSPITAL COURSE  80 y.o. female with past medical history of coronary artery disease who presented to the hospital on 9/11/2020 with complaint of shortness of breath for 3 weeks.  She reported she has not been feeling well for 3 weeks and she has been having shortness of breath that has been progressively getting worse.  She received antibiotic prescribed by her primary care provider.  She reported she feels significantly weak and slight exertion make her symptoms of shortness of breath significantly worse.  There is no specific alleviating factors.  She reported mild cough and very small amount of phlegm production.  She denies any known COVID-19 exposure.  She denies any symptoms of fever.  She denies any symptoms of recent travel.  She denies any bilateral lower extremity edema.  She has history of coronary artery disease and she follows cardiology.  She denies any symptoms of chest pain, nausea, vomiting, diarrhea, constipation and abdominal pain.     ER course: She underwent CTA pulmonary it did not show pulmonary embolism and she found to have findings of possible COVID-19.  She underwent COVID-19 testing and it came back positive.    9/12/2020: Patient's shortness of breath is resolved.  She is feeling back to her baseline.  She is denying shortness of breath, chest pain, wheezes, nausea, vomiting, abdominal pain, leg pain.  Patient is anxious to return home.  Medically she appears back to her baseline and can be safely discharged.  Discussed with  the patient the need to stay quarantined for 2 weeks.  Patient reports that she understands and will stay quarantined.  She understands that she needs to avoid grocery stores, pharmacies etc. patient understands that if her symptoms were to return or worsen she is to return to the emergency department to be evaluated again.       Therefore, she is discharged in good and stable condition to home with close outpatient follow-up.    The patient recovered much more quickly than anticipated on admission.    Discharge Date  9/12/2020    FOLLOW UP ITEMS POST DISCHARGE  COVID-19 infection    DISCHARGE DIAGNOSES  Active Problems:    COVID-19 virus infection POA: Unknown    S/P right coronary artery (RCA) stent placement POA: Yes    Elevated brain natriuretic peptide (BNP) level POA: Unknown    Hypokalemia POA: Unknown  Resolved Problems:    * No resolved hospital problems. *      FOLLOW UP  No future appointments.  Jaime Merion M.D.  [address] [place] [address]  [phone]    In 2 weeks        MEDICATIONS ON DISCHARGE     Medication List      CONTINUE taking these medications      Instructions   acetaminophen 500 MG Tabs  Commonly known as: TYLENOL   Take 500-1,000 mg by mouth as needed.  Dose: 500-1,000 mg     aspirin 81 MG Chew chewable tablet  Commonly known as: ASA   Take 1 Tab by mouth every day.  Dose: 81 mg     atenolol 25 MG Tabs  Commonly known as: TENORMIN   Take 0.5 Tabs by mouth every evening.  Dose: 12.5 mg     atorvastatin 40 MG Tabs  Commonly known as: LIPITOR   Take 1 Tab by mouth every evening.  Dose: 40 mg     fexofenadine 180 MG tablet  Commonly known as: ALLEGRA   Take 180 mg by mouth as needed.  Dose: 180 mg     Melatonin 5 MG Tabs   Take 5 mg by mouth 1 time daily as needed.  Dose: 5 mg     omeprazole 20 MG delayed-release capsule  Commonly known as: PRILOSEC   Take 20 mg by mouth as needed.  Dose: 20 mg            Allergies  Allergies   Allergen Reactions   • Sulfa Drugs  Vomiting       DIET  Orders Placed This Encounter   Procedures   • Diet Order Cardiac     Standing Status:   Standing     Number of Occurrences:   1     Order Specific Question:   Diet:     Answer:   Cardiac [6]       ACTIVITY  As tolerated.  Weight bearing as tolerated    CONSULTATIONS  none    PROCEDURES  none    LABORATORY  Lab Results   Component Value Date    SODIUM 138 09/11/2020    POTASSIUM 3.0 (L) 09/11/2020    CHLORIDE 97 09/11/2020    CO2 26 09/11/2020    GLUCOSE 83 09/11/2020    BUN 16 09/11/2020    CREATININE 0.91 09/11/2020        Lab Results   Component Value Date    WBC 8.7 09/11/2020    HEMOGLOBIN 13.9 09/11/2020    HEMATOCRIT 40.3 09/11/2020    PLATELETCT 260 09/11/2020      Instructions  The patient was instructed to return to the ER in the event of worsening symptoms. I have counseled the patient on the importance of compliance and the patient has agreed to proceed with all medical recommendations and follow up plan indicated above.   The patient understands that all medications come with benefits and risks. Risks may include permanent injury or death and these risks can be minimized with close reassessment and monitoring.    Total time of the discharge process exceeds 35 minutes.

## 2020-09-12 NOTE — PROGRESS NOTES
Skin check done with 2 RN's. No broken areas noted. Some bruising to both arms especially. Most likely due to lab draws and IV access.

## 2020-09-12 NOTE — ED NOTES
Report given at bedside. Patient updated on plan of care.  Denies needs, all questions answered.

## 2020-09-12 NOTE — DISCHARGE INSTRUCTIONS
Discharge Instructions    Discharged to home by car with relative. Discharged via wheelchair, hospital escort: Yes.  Special equipment needed: Not Applicable    Be sure to schedule a follow-up appointment with your primary care doctor or any specialists as instructed.     Discharge Plan:        I understand that a diet low in cholesterol, fat, and sodium is recommended for good health. Unless I have been given specific instructions below for another diet, I accept this instruction as my diet prescription.   Other diet: regular    Special Instructions: None    · Is patient discharged on Warfarin / Coumadin?   No     Depression / Suicide Risk    As you are discharged from this [facility] facility, it is important to learn how to keep safe from harming yourself.    Recognize the warning signs:  · Abrupt changes in personality, positive or negative- including increase in energy   · Giving away possessions  · Change in eating patterns- significant weight changes-  positive or negative  · Change in sleeping patterns- unable to sleep or sleeping all the time   · Unwillingness or inability to communicate  · Depression  · Unusual sadness, discouragement and loneliness  · Talk of wanting to die  · Neglect of personal appearance   · Rebelliousness- reckless behavior  · Withdrawal from people/activities they love  · Confusion- inability to concentrate     If you or a loved one observes any of these behaviors or has concerns about self-harm, here's what you can do:  · Talk about it- your feelings and reasons for harming yourself  · Remove any means that you might use to hurt yourself (examples: pills, rope, extension cords, firearm)  · Get professional help from the community (Mental Health, Substance Abuse, psychological counseling)  · Do not be alone:Call your Safe Contact- someone whom you trust who will be there for you.  · Call your local CRISIS HOTLINE 236-0721 or 554-433-6731  · Call your local Children's Mobile Crisis  Response Team [facility] (273) 859-7539 or www.[employer].Pulse 8  · Call the toll free National Suicide Prevention Hotlines   · National Suicide Prevention Lifeline 190-022-QHPH (4991)  · National Hope Line Network 800-SUICIDE (711-8419)

## 2020-09-12 NOTE — ASSESSMENT & PLAN NOTE
She presented with complaint of shortness of breath and she underwent CTA pulmonary and she found to have multiple bilateral ill-defined groundglass pulmonary infiltrates.  She underwent COVID-19 testing and it came back positive.  I ordered COVID-19 admission order set and ordered inflammatory markers which include ferritin, CRP and d-dimer  She may need full anticoagulation depending upon d-dimer results.  Droplet, contact and eye protection precautions  I discussed plan of care with her and discussed finding of CTA and COVID-19 test results with her.

## 2020-09-12 NOTE — PROGRESS NOTES
Admission completed. Patient is a little hard of hearing. Almost seems confused slightly at times. Found her once with her gown torn off and half of her monitor leads. Restless at times and c/o noise on other side of room (semi-private), but I do not notice anything. No SOB or c/o. Only asks for water.

## 2020-09-12 NOTE — ASSESSMENT & PLAN NOTE
She presented with complaint of shortness of breath and found to have elevated BNP  She does not have bilateral lower extremities edema.  I ordered echocardiogram to evaluate it further  Admit to telemetry.

## 2020-09-12 NOTE — ASSESSMENT & PLAN NOTE
She found to have hypokalemia.  I started her on potassium replacement.  Continue to monitor and replace as needed.

## 2020-09-15 LAB — IL6 SERPL-MCNC: <2 PG/ML

## 2020-09-17 ENCOUNTER — TELEPHONE (OUTPATIENT)
Dept: CARDIOLOGY | Facility: MEDICAL CENTER | Age: 80
End: 2020-09-17

## 2020-09-17 NOTE — TELEPHONE ENCOUNTER
NARGIS Matos.  Quiana Prieto R.N.             Please let patient know that she did have 4 episodes of elevated heart rate that was concerning for possible PAF. Due to her history and age she is at risk for CVA. I would like for her to consider resuming Eliquis (previously stopped due to patients wishes) and stop the ASA. If she continues to refuse OAC, stay on ASA and Plavix. Otherwise no significant arrhythmia or ectopy observed.          Attempted to call pt to discuss above, no answer, LM for her to call back.

## 2020-09-17 NOTE — LETTER
September 25, 2020        Alondra Otero  [address] [place] [address]          Dear Alondra,      We were unable to reach you via phone.    Trudy reviewed your heart monitor results. You had 4 episodes of elevated heart rate which were concerning for possible atrial fibrillation. No other significant heart arrhythmias were noted. Due to your medical history and age, you are at risk for a stroke. Please consider re-starting Eliquis and stop taking aspirin.    If you are not agreeable to re-starting Eliquis, Trudy recommends you continue staying on aspirin and Plavix.    If you have any questions or concerns, please give us a call at [phone].      Thank you,    Quiana RODRIGUEZ for [facility]  Cardiology

## 2020-09-25 NOTE — TELEPHONE ENCOUNTER
Attempted to call pt again this morning, no answer, LM for her to call back. Letter drafted and mailed out to pt.

## 2020-10-19 NOTE — PROGRESS NOTES
"Chief Complaint   Patient presents with   • Coronary Artery Disease   • Atrial Fibrillation   • HTN (Controlled)       Subjective:   Alondra Otero is a 79 y.o. female who presents today with c/o excessive bleeding with DAPT.     Patient was last seen by Dr. Armstrong on 8/28/2020. She was advised to continue wearing her cardiac event monitor to rule out PAF or AV block and follow up in 6 months.     Past medical history significant for hypertension.    Today patient states that she is feeling well overall. She decided to stop taking her Plavix approximately 5 weeks ago due to not feeling well and excessive bruising. When I asked how she did not feel well she said she cannot put her finger on it, she just felt \"awful\" and every day since she stopped it she has felt better and better.     She denies palpitations, edema, SOB, chest pain, dizziness or syncope.     Past Medical History:   Diagnosis Date   • Heart burn    • Hypertension      Past Surgical History:   Procedure Laterality Date   • RHYTIDECTOMY  10/15/2012    Performed by Nelsy Sun M.D. at SURGERY [facility]   • BLEPHAROPLASTY  10/15/2012    Performed by Nelsy Sun M.D. at [facility] ORS   • CHOLECYSTECTOMY  2004   • HYSTERECTOMY LAPAROSCOPY  1975     History reviewed. No pertinent family history.  Social History     Socioeconomic History   • Marital status:      Spouse name: Not on file   • Number of children: Not on file   • Years of education: Not on file   • Highest education level: Not on file   Occupational History   • Not on file   Social Needs   • Financial resource strain: Not hard at all   • Food insecurity     Worry: Never true     Inability: Never true   • Transportation needs     Medical: No     Non-medical: No   Tobacco Use   • Smoking status: Never Smoker   • Smokeless tobacco: Never Used   Substance and Sexual Activity   • Alcohol use: Yes     Comment: 1 per week   • Drug use: No   • Sexual " activity: Not Currently   Lifestyle   • Physical activity     Days per week: 0 days     Minutes per session: 0 min   • Stress: Not at all   Relationships   • Social connections     Talks on phone: Three times a week     Gets together: Three times a week     Attends Mandaeism service: 1 to 4 times per year     Active member of club or organization: No     Attends meetings of clubs or organizations: Not on file     Relationship status:    • Intimate partner violence     Fear of current or ex partner: No     Emotionally abused: No     Physically abused: No     Forced sexual activity: No   Other Topics Concern   • Not on file   Social History Narrative   • Not on file     Allergies   Allergen Reactions   • Sulfa Drugs Vomiting     Outpatient Encounter Medications as of 10/20/2020   Medication Sig Dispense Refill   • aspirin (ASA) 81 MG Chew Tab chewable tablet Take 1 Tab by mouth every day. 100 Tab 3   • atorvastatin (LIPITOR) 40 MG Tab Take 1 Tab by mouth every evening. 90 Tab 3   • atenolol (TENORMIN) 25 MG Tab Take 0.5 Tabs by mouth every evening. 90 Tab 3   • acetaminophen (TYLENOL) 500 MG Tab Take 500-1,000 mg by mouth as needed.     • [DISCONTINUED] FLUAD QUADRIVALENT 0.5 ML Prefilled Syringe PHARMACY ADMINISTERED     • [DISCONTINUED] levoFLOXacin (LEVAQUIN) 500 MG tablet Take 500 mg by mouth every day.     • [DISCONTINUED] nitrofurantoin (MACROBID) 100 MG Cap Take 100 mg by mouth.     • [DISCONTINUED] omeprazole (PRILOSEC) 20 MG delayed-release capsule Take 20 mg by mouth as needed.     • [DISCONTINUED] fexofenadine (ALLEGRA) 180 MG tablet Take 180 mg by mouth as needed.     • [DISCONTINUED] Melatonin 5 MG Tab Take 5 mg by mouth 1 time daily as needed.       No facility-administered encounter medications on file as of 10/20/2020.      Review of Systems   Constitutional: Negative for malaise/fatigue and weight loss.   Respiratory: Negative for shortness of breath.    Cardiovascular: Negative for chest  "pain, palpitations, orthopnea, claudication, leg swelling and PND.   Neurological: Negative for dizziness and weakness.   All other systems reviewed and are negative.       Objective:   /84 (BP Location: Left arm, Patient Position: Sitting, BP Cuff Size: Adult)   Pulse 82   Resp 18   Ht 1.575 m (5' 2\")   Wt 65.3 kg (144 lb)   SpO2 94%   BMI 26.34 kg/m²     Physical Exam   Constitutional: She is oriented to person, place, and time. She appears well-developed and well-nourished. No distress.   HENT:   Head: Normocephalic.   Eyes: EOM are normal.   Neck: No JVD present.   Cardiovascular: Normal rate, regular rhythm and normal heart sounds.   Pulmonary/Chest: Breath sounds normal. No respiratory distress.   Abdominal: Soft. There is no abdominal tenderness.   Musculoskeletal:         General: No edema.   Neurological: She is alert and oriented to person, place, and time.   Skin: Skin is warm and dry.   Psychiatric: She has a normal mood and affect. Her behavior is normal.        Thoracoabdominal CT-CTA 12/17/19:  1.  Intramural hematoma of the ascending aorta has decreased in size and density. It extends approximately prison through the ascending aorta. No dissection flap. No aortic aneurysm.  2.  No acute abnormality in the chest, abdomen or pelvis.  3.  Stable 2.2 cm left thyroid nodule can be further evaluated with outpatient ultrasound.  4.  Colonic diverticulosis.     Echocardiogram 12/11/19:  CONCLUSIONS  No prior study is available for comparison.   Technically difficult study.   Normal left ventricular chamber size.  Inferior wall hypokinesis.  Left ventricular systolic function is normal.  Left ventricular ejection fraction is visually estimated to be 55-60%.  Normal left ventricular wall thickness.  Normal pericardium without effusion.    Echocardiogram 9/12/2020:  CONCLUSIONS  Normal left ventricular chamber size.  Left ventricular ejection fraction is visually estimated to be 70%.  Mild to " moderate aortic insufficiency.  Normal right ventricular size and systolic function.       Cardiac Catheterization 12/11/19:  Coronary artery disease     This is right dominant system.     Left main is large and without flow limiting disease.  It bifurcated into left anterior descending and left circumflex artery.      Left anterior descending artery is large caliber vessel.  It gives rises to several small  diagonal branches. Limited images was obtained but there is no signficant disease in the left anterior descending artery or its major branches seen.  The antegrade flow is normal.     Left circumflex artery is large in caliber. Limited image of the LCX was also obtained.  It gives rise to several obtuse marginal branches.  There is no significant disease in the LCX system.  The antegrade flow is normal.     Right coronary artery (RCA) is large caliber.   At debating beginning the case, the RCA was occluded distally at the crux after giving rise to several small acute marginal branches. There was also large amount of thrombus causing subtotal occlusion at the ostium of the posterior descending artery and the posterolateral branch was not visualized.     After intervention, there was no significant residual stenosis in the distal RCA or the posterolateral branch with residual thrombus at the ostium of the PDA with ALEX II flow.      Post-operative Diagnosis:   1.  Acute inferior ST elevation microinfarction secondary to total occlusion of distal right coronary artery  2.  Second-degree AV block Mobitz type I  3.  Hypokinesis of the basal to mid inferior wall with preserved overall LV systolic function  4.  Status post stenting of the ostium and distal right coronary artery with 4 x 23 mm bare-metal stent and 2.25 x 18 mm [place] drug eluting stent respectively     Assessment:     1. Coronary artery disease involving native coronary artery of native heart without angina pectoris  LIPID PANEL   2. S/P right coronary  "artery (RCA) stent placement     3. PAF (paroxysmal atrial fibrillation) ([facility])  Basic Metabolic Panel   4. Nonrheumatic aortic valve insufficiency     5. Dyslipidemia, goal LDL below 70         Medical Decision Making:  Today's Assessment / Status / Plan:     CAD S/P Stemi:  - S/P revascularization of the RCA (NILSA to distal RCA, BMS to ostial RCA and PTCA of PDA) on 12/11/2019 which was complicated by a contained a sending aortic dissection.   - Per cath report \"no significant residual stenosis of the distal RCA or the posterolateral branch with residual thrombus at the ostium of the PDA with ALEX II flow\".   - Preserved LVEF with inferior wall hypokinesis per TTE in December of 2019.   - Denies angina or ELLSWORTH.   - Patient refusing DAPT despite extensive discussion regarding the risk on in-stent thrombosis within the first year. I also offered alternatives to Plavix such as Effient and Brilinta which the patient declined.   - Continue ASA 81 mg daily, atenolol 12.5 mg BID and atorvastatin 40 mg daily.    LDL above goal:  -Repeat lipid panel.     Ascending aortic dissection:  -Repeat CTA of aorta/chest/pelvis completed prior to discharge showed decrease in size and density.     Aortic Insufficiency:  - Mild to moderate per TTE, remains asymptomatic.   - Will continue to monitor.     PAF:  - Originally observed initially after STEMI and reverted on IV amio.   -13 day biotel showed 2 brief episodes of irregular SVT concerning for PAF.   - Rate remains regular upon auscultation today.  - Again discussed risk and benefit of OAC, patient continues to decline OAC at this time.   - Continue ASA and atenolol as above.     Anemia:  - Resolved.     Patient will follow-up with Dr. Armstrong in 6 months or earlier if needed.  Encouraged patient to contact our office should any questions or concerns arise in the meantime. Patient understands and agrees with the plan of care.     Please note that this dictation was created using " voice recognition software. I have made every reasonable attempt to correct obvious errors, but I expect that there are errors of grammar and possibly content I did not discover before finalizing the note.

## 2020-10-20 ENCOUNTER — OFFICE VISIT (OUTPATIENT)
Dept: CARDIOLOGY | Facility: MEDICAL CENTER | Age: 80
End: 2020-10-20
Payer: MEDICARE

## 2020-10-20 VITALS
DIASTOLIC BLOOD PRESSURE: 84 MMHG | RESPIRATION RATE: 18 BRPM | HEART RATE: 82 BPM | SYSTOLIC BLOOD PRESSURE: 106 MMHG | WEIGHT: 144 LBS | HEIGHT: 62 IN | OXYGEN SATURATION: 94 % | BODY MASS INDEX: 26.5 KG/M2

## 2020-10-20 DIAGNOSIS — I35.1 NONRHEUMATIC AORTIC VALVE INSUFFICIENCY: ICD-10-CM

## 2020-10-20 DIAGNOSIS — E78.5 DYSLIPIDEMIA, GOAL LDL BELOW 70: ICD-10-CM

## 2020-10-20 DIAGNOSIS — Z95.5 S/P RIGHT CORONARY ARTERY (RCA) STENT PLACEMENT: ICD-10-CM

## 2020-10-20 DIAGNOSIS — I25.10 CORONARY ARTERY DISEASE INVOLVING NATIVE CORONARY ARTERY OF NATIVE HEART WITHOUT ANGINA PECTORIS: ICD-10-CM

## 2020-10-20 DIAGNOSIS — I48.0 PAF (PAROXYSMAL ATRIAL FIBRILLATION) (HCC): ICD-10-CM

## 2020-10-20 PROCEDURE — 99214 OFFICE O/P EST MOD 30 MIN: CPT | Performed by: NURSE PRACTITIONER

## 2020-10-20 RX ORDER — LEVOFLOXACIN 500 MG/1
500 TABLET, FILM COATED ORAL
COMMUNITY
Start: 2020-09-08 | End: 2020-10-20

## 2020-10-20 RX ORDER — NITROFURANTOIN 25; 75 MG/1; MG/1
100 CAPSULE ORAL
COMMUNITY
Start: 2020-08-31 | End: 2020-10-20

## 2020-10-20 RX ORDER — A/SINGAPORE/GP1908/2015 IVR-180 (AN A/MICHIGAN/45/2015 (H1N1)PDM09-LIKE VIRUS, A/HONG KONG/4801/2014, NYMC X-263B (H3N2) (AN A/HONG KONG/4801/2014-LIKE VIRUS), AND B/BRISBANE/60/2008, WILD TYPE (A B/BRISBANE/60/2008-LIKE VIRUS) 15; 15; 15 UG/.5ML; UG/.5ML; UG/.5ML
INJECTION, SUSPENSION INTRAMUSCULAR
COMMUNITY
Start: 2020-10-03 | End: 2020-10-20

## 2020-10-20 ASSESSMENT — FIBROSIS 4 INDEX: FIB4 SCORE: 2.19

## 2020-10-21 PROBLEM — E78.5 DYSLIPIDEMIA, GOAL LDL BELOW 70: Status: ACTIVE | Noted: 2020-10-21

## 2020-10-21 PROBLEM — I35.1 AI (AORTIC INSUFFICIENCY): Status: ACTIVE | Noted: 2020-10-21

## 2020-10-21 ASSESSMENT — ENCOUNTER SYMPTOMS
CLAUDICATION: 0
DIZZINESS: 0
ORTHOPNEA: 0
PALPITATIONS: 0
SHORTNESS OF BREATH: 0
PND: 0
WEAKNESS: 0
WEIGHT LOSS: 0

## 2020-12-22 LAB
BUN SERPL-MCNC: 14 MG/DL (ref 8–27)
BUN/CREAT SERPL: 14 (ref 12–28)
CALCIUM SERPL-MCNC: 9.5 MG/DL (ref 8.7–10.3)
CHLORIDE SERPL-SCNC: 103 MMOL/L (ref 96–106)
CHOLEST SERPL-MCNC: 166 MG/DL (ref 100–199)
CO2 SERPL-SCNC: 26 MMOL/L (ref 20–29)
CREAT SERPL-MCNC: 1 MG/DL (ref 0.57–1)
GLUCOSE SERPL-MCNC: 95 MG/DL (ref 65–99)
HDLC SERPL-MCNC: 80 MG/DL
LABORATORY COMMENT REPORT: NORMAL
LDLC SERPL CALC-MCNC: 69 MG/DL (ref 0–99)
POTASSIUM SERPL-SCNC: 3.8 MMOL/L (ref 3.5–5.2)
SODIUM SERPL-SCNC: 140 MMOL/L (ref 134–144)
TRIGL SERPL-MCNC: 94 MG/DL (ref 0–149)
VLDLC SERPL CALC-MCNC: 17 MG/DL (ref 5–40)

## 2020-12-28 NOTE — PROGRESS NOTES
Chief Complaint   Patient presents with   • Coronary Artery Disease   • Aortic Dissection   • Paroxysmal Atrial Tachycardia (PAT)   • Aortic Stenosis     F/V Dx: S/P right coronary artery (RCA) stent placement   • MI (Non ST Segment Elevation MI)     F/V Dx:Acute ST elevation myocardial infarction (STEMI) of inferoposterior wall (HCC)       Subjective:   Alondra Otero is a 79 y.o. female who presents today with c/o excessive bleeding with DAPT.     Patient was last seen by myself on 10/20/2020. She had stopped her Plavix due to not feeling well and excessive bruising. The importance and rationale of DAPT was discussed and patient continues to decline. She was overall doing well and no significant changes were made to her cardiovascular regimne.     Past medical history significant for hypertension, CAD S/P revascularization of the RCA (NILSA to distal RCA, BMS to ostial RCA and PTCA of PDA) on 12/11/2019 which was complicated by a contained a sending aortic dissection and PAF.     Other past medical history significant for AI, HTN and HLD.     Today patient states that she is feeling well overall. Denies having any significant concerns or complaints to discuss today.     She denies palpitations, edema, SOB, chest pain, dizziness or syncope.     Past Medical History:   Diagnosis Date   • Heart burn    • Hypertension      Past Surgical History:   Procedure Laterality Date   • RHYTIDECTOMY  10/15/2012    Performed by Nelsy Sun M.D. at SURGERY [facility]   • BLEPHAROPLASTY  10/15/2012    Performed by Nelsy Sun M.D. at [facility]   • CHOLECYSTECTOMY  2004   • HYSTERECTOMY LAPAROSCOPY  1975     History reviewed. No pertinent family history.  Social History     Socioeconomic History   • Marital status:      Spouse name: Not on file   • Number of children: Not on file   • Years of education: Not on file   • Highest education level: Not on file   Occupational History   •  Not on file   Social Needs   • Financial resource strain: Not hard at all   • Food insecurity     Worry: Never true     Inability: Never true   • Transportation needs     Medical: No     Non-medical: No   Tobacco Use   • Smoking status: Never Smoker   • Smokeless tobacco: Never Used   Substance and Sexual Activity   • Alcohol use: Yes     Alcohol/week: 1.2 oz     Types: 2 Shots of liquor per week     Frequency: 2-3 times a week     Drinks per session: 1 or 2     Comment: 1 per week   • Drug use: No   • Sexual activity: Not Currently   Lifestyle   • Physical activity     Days per week: 0 days     Minutes per session: 0 min   • Stress: Not at all   Relationships   • Social connections     Talks on phone: Three times a week     Gets together: Three times a week     Attends Mosque service: 1 to 4 times per year     Active member of club or organization: No     Attends meetings of clubs or organizations: Not on file     Relationship status:    • Intimate partner violence     Fear of current or ex partner: No     Emotionally abused: No     Physically abused: No     Forced sexual activity: No   Other Topics Concern   • Not on file   Social History Narrative   • Not on file     Allergies   Allergen Reactions   • Sulfa Drugs Vomiting     Outpatient Encounter Medications as of 1/4/2021   Medication Sig Dispense Refill   • atorvastatin (LIPITOR) 40 MG Tab Take 1 Tab by mouth every evening. 90 Tab 3   • atenolol (TENORMIN) 25 MG Tab Take 0.5 Tabs by mouth every evening. 90 Tab 3   • aspirin (ASA) 81 MG Chew Tab chewable tablet Chew 1 Tab every day. 100 Tab 3   • acetaminophen (TYLENOL) 500 MG Tab Take 500-1,000 mg by mouth as needed.     • [DISCONTINUED] aspirin (ASA) 81 MG Chew Tab chewable tablet Take 1 Tab by mouth every day. 100 Tab 3   • [DISCONTINUED] atorvastatin (LIPITOR) 40 MG Tab Take 1 Tab by mouth every evening. 90 Tab 3   • [DISCONTINUED] atenolol (TENORMIN) 25 MG Tab Take 0.5 Tabs by mouth every  "evening. 90 Tab 3     No facility-administered encounter medications on file as of 1/4/2021.      Review of Systems   Constitutional: Negative for malaise/fatigue and weight loss.   Respiratory: Negative for shortness of breath.    Cardiovascular: Negative for chest pain, palpitations, orthopnea, claudication, leg swelling and PND.   Neurological: Negative for dizziness and weakness.   All other systems reviewed and are negative.       Objective:   /64 (BP Location: Left arm, Patient Position: Sitting)   Pulse 78   Resp 16   Ht 1.575 m (5' 2\")   Wt 67 kg (147 lb 9.6 oz)   SpO2 95%   BMI 27.00 kg/m²     Physical Exam   Constitutional: She is oriented to person, place, and time. She appears well-developed and well-nourished. No distress.   HENT:   Head: Normocephalic.   Eyes: EOM are normal.   Neck: No JVD present.   Cardiovascular: Normal rate, regular rhythm and normal heart sounds.   Pulmonary/Chest: Breath sounds normal. No respiratory distress.   Abdominal: Soft. There is no abdominal tenderness.   Musculoskeletal:         General: No edema.   Neurological: She is alert and oriented to person, place, and time.   Skin: Skin is warm and dry.   Psychiatric: She has a normal mood and affect. Her behavior is normal.        Thoracoabdominal CT-CTA 12/17/19:  1.  Intramural hematoma of the ascending aorta has decreased in size and density. It extends approximately group home through the ascending aorta. No dissection flap. No aortic aneurysm.  2.  No acute abnormality in the chest, abdomen or pelvis.  3.  Stable 2.2 cm left thyroid nodule can be further evaluated with outpatient ultrasound.  4.  Colonic diverticulosis.     Echocardiogram 12/11/19:  CONCLUSIONS  No prior study is available for comparison.   Technically difficult study.   Normal left ventricular chamber size.  Inferior wall hypokinesis.  Left ventricular systolic function is normal.  Left ventricular ejection fraction is visually estimated to be " 55-60%.  Normal left ventricular wall thickness.  Normal pericardium without effusion.    Echocardiogram 9/12/2020:  CONCLUSIONS  Normal left ventricular chamber size.  Left ventricular ejection fraction is visually estimated to be 70%.  Mild to moderate aortic insufficiency.  Normal right ventricular size and systolic function.       Cardiac Catheterization 12/11/19:  Coronary artery disease     This is right dominant system.     Left main is large and without flow limiting disease.  It bifurcated into left anterior descending and left circumflex artery.      Left anterior descending artery is large caliber vessel.  It gives rises to several small  diagonal branches. Limited images was obtained but there is no signficant disease in the left anterior descending artery or its major branches seen.  The antegrade flow is normal.     Left circumflex artery is large in caliber. Limited image of the LCX was also obtained.  It gives rise to several obtuse marginal branches.  There is no significant disease in the LCX system.  The antegrade flow is normal.     Right coronary artery (RCA) is large caliber.   At debating beginning the case, the RCA was occluded distally at the crux after giving rise to several small acute marginal branches. There was also large amount of thrombus causing subtotal occlusion at the ostium of the posterior descending artery and the posterolateral branch was not visualized.     After intervention, there was no significant residual stenosis in the distal RCA or the posterolateral branch with residual thrombus at the ostium of the PDA with ALEX II flow.      Post-operative Diagnosis:   1.  Acute inferior ST elevation microinfarction secondary to total occlusion of distal right coronary artery  2.  Second-degree AV block Mobitz type I  3.  Hypokinesis of the basal to mid inferior wall with preserved overall LV systolic function  4.  Status post stenting of the ostium and distal right coronary artery  "with 4 x 23 mm bare-metal stent and 2.25 x 18 mm [place] drug eluting stent respectively     Assessment:     1. S/P right coronary artery (RCA) stent placement  REFERRAL TO INTENSIVE CARDIAC REHAB/CARDIAC REHAB   2. Coronary artery disease involving native coronary artery of native heart without angina pectoris     3. Dyslipidemia, goal LDL below 70     4. Essential hypertension     5. PAF (paroxysmal atrial fibrillation) (HCC)     6. Nonrheumatic aortic valve insufficiency         Medical Decision Making:  Today's Assessment / Status / Plan:     CAD S/P Stemi:  - S/P revascularization of the RCA (NILSA to distal RCA, BMS to ostial RCA and PTCA of PDA) on 12/11/2019 which was complicated by a contained a sending aortic dissection. Per cath report \"no significant residual stenosis of the distal RCA or the posterolateral branch with residual thrombus at the ostium of the PDA with ALEX II flow\".   - Preserved LVEF with inferior wall hypokinesis per TTE in December of 2019.   -LDL at goal (69).   - Denies angina or ELLSWORTH.   - Continue ASA 81 mg daily, atenolol 12.5 mg BID and atorvastatin 40 mg daily.    Aortic Insufficiency:  - Mild to moderate per TTE, remains asymptomatic.   - Will continue to monitor.   - Concerning signs and symptoms reviewed with patient and her son Lonny via telephone.     PAF:  - Originally observed initially after STEMI and reverted on IV amio.   -13 day biotel showed 2 brief episodes of irregular SVT concerning for PAF.   - Rate remains regular upon auscultation today.  - Again discussed risk and benefit of OAC, patient continues to decline OAC at this time.   - Continue ASA and atenolol as above.     Patient will follow-up with Dr. Armstrong in one year or earlier if needed.  Encouraged patient to contact our office should any questions or concerns arise in the meantime. Patient understands and agrees with the plan of care.     Please note that this dictation was created using voice recognition " software. I have made every reasonable attempt to correct obvious errors, but I expect that there are errors of grammar and possibly content I did not discover before finalizing the note.

## 2021-01-04 ENCOUNTER — OFFICE VISIT (OUTPATIENT)
Dept: CARDIOLOGY | Facility: MEDICAL CENTER | Age: 81
End: 2021-01-04
Payer: MEDICARE

## 2021-01-04 VITALS
SYSTOLIC BLOOD PRESSURE: 118 MMHG | BODY MASS INDEX: 27.16 KG/M2 | HEIGHT: 62 IN | RESPIRATION RATE: 16 BRPM | WEIGHT: 147.6 LBS | HEART RATE: 78 BPM | DIASTOLIC BLOOD PRESSURE: 64 MMHG | OXYGEN SATURATION: 95 %

## 2021-01-04 DIAGNOSIS — I10 ESSENTIAL HYPERTENSION: ICD-10-CM

## 2021-01-04 DIAGNOSIS — I35.1 NONRHEUMATIC AORTIC VALVE INSUFFICIENCY: ICD-10-CM

## 2021-01-04 DIAGNOSIS — I25.10 CORONARY ARTERY DISEASE INVOLVING NATIVE CORONARY ARTERY OF NATIVE HEART WITHOUT ANGINA PECTORIS: ICD-10-CM

## 2021-01-04 DIAGNOSIS — E78.5 DYSLIPIDEMIA, GOAL LDL BELOW 70: ICD-10-CM

## 2021-01-04 DIAGNOSIS — Z95.5 S/P RIGHT CORONARY ARTERY (RCA) STENT PLACEMENT: ICD-10-CM

## 2021-01-04 DIAGNOSIS — I48.0 PAF (PAROXYSMAL ATRIAL FIBRILLATION) (HCC): ICD-10-CM

## 2021-01-04 PROCEDURE — 99214 OFFICE O/P EST MOD 30 MIN: CPT | Performed by: NURSE PRACTITIONER

## 2021-01-04 RX ORDER — ATORVASTATIN CALCIUM 40 MG/1
40 TABLET, FILM COATED ORAL EVERY EVENING
Qty: 90 TAB | Refills: 3 | Status: SHIPPED | OUTPATIENT
Start: 2021-01-04 | End: 2021-03-31 | Stop reason: SDUPTHER

## 2021-01-04 RX ORDER — ATENOLOL 25 MG/1
12.5 TABLET ORAL EVERY EVENING
Qty: 90 TAB | Refills: 3 | Status: SHIPPED | OUTPATIENT
Start: 2021-01-04 | End: 2021-03-31 | Stop reason: SDUPTHER

## 2021-01-04 RX ORDER — ASPIRIN 81 MG/1
81 TABLET, CHEWABLE ORAL DAILY
Qty: 100 TAB | Refills: 3 | Status: SHIPPED | OUTPATIENT
Start: 2021-01-04 | End: 2021-03-31 | Stop reason: SDUPTHER

## 2021-01-04 SDOH — HEALTH STABILITY: MENTAL HEALTH: HOW MANY STANDARD DRINKS CONTAINING ALCOHOL DO YOU HAVE ON A TYPICAL DAY?: 1 OR 2

## 2021-01-04 SDOH — HEALTH STABILITY: MENTAL HEALTH: HOW OFTEN DO YOU HAVE A DRINK CONTAINING ALCOHOL?: 2-3 TIMES A WEEK

## 2021-01-04 ASSESSMENT — ENCOUNTER SYMPTOMS
DIZZINESS: 0
ORTHOPNEA: 0
CLAUDICATION: 0
SHORTNESS OF BREATH: 0
PND: 0
WEIGHT LOSS: 0
PALPITATIONS: 0
WEAKNESS: 0

## 2021-01-04 ASSESSMENT — FIBROSIS 4 INDEX: FIB4 SCORE: 2.19

## 2021-01-15 DIAGNOSIS — Z23 NEED FOR VACCINATION: ICD-10-CM

## 2021-02-25 ENCOUNTER — HOSPITAL ENCOUNTER (OUTPATIENT)
Facility: MEDICAL CENTER | Age: 81
End: 2021-02-26
Attending: EMERGENCY MEDICINE | Admitting: STUDENT IN AN ORGANIZED HEALTH CARE EDUCATION/TRAINING PROGRAM
Payer: MEDICARE

## 2021-02-25 DIAGNOSIS — R42 VERTIGO: ICD-10-CM

## 2021-02-25 PROBLEM — R11.2 N&V (NAUSEA AND VOMITING): Status: ACTIVE | Noted: 2021-02-25

## 2021-02-25 LAB
ANION GAP SERPL CALC-SCNC: 15 MMOL/L (ref 7–16)
BASOPHILS # BLD AUTO: 0.3 % (ref 0–1.8)
BASOPHILS # BLD: 0.04 K/UL (ref 0–0.12)
BUN SERPL-MCNC: 27 MG/DL (ref 8–22)
CALCIUM SERPL-MCNC: 9.5 MG/DL (ref 8.5–10.5)
CHLORIDE SERPL-SCNC: 104 MMOL/L (ref 96–112)
CO2 SERPL-SCNC: 20 MMOL/L (ref 20–33)
CREAT SERPL-MCNC: 0.99 MG/DL (ref 0.5–1.4)
EKG IMPRESSION: NORMAL
EOSINOPHIL # BLD AUTO: 0.1 K/UL (ref 0–0.51)
EOSINOPHIL NFR BLD: 0.9 % (ref 0–6.9)
ERYTHROCYTE [DISTWIDTH] IN BLOOD BY AUTOMATED COUNT: 43.2 FL (ref 35.9–50)
EST. AVERAGE GLUCOSE BLD GHB EST-MCNC: 100 MG/DL
GLUCOSE SERPL-MCNC: 165 MG/DL (ref 65–99)
HBA1C MFR BLD: 5.1 % (ref 4–5.6)
HCT VFR BLD AUTO: 40.3 % (ref 37–47)
HGB BLD-MCNC: 13.9 G/DL (ref 12–16)
IMM GRANULOCYTES # BLD AUTO: 0.18 K/UL (ref 0–0.11)
IMM GRANULOCYTES NFR BLD AUTO: 1.5 % (ref 0–0.9)
INR PPP: 0.98 (ref 0.87–1.13)
LYMPHOCYTES # BLD AUTO: 2.32 K/UL (ref 1–4.8)
LYMPHOCYTES NFR BLD: 19.9 % (ref 22–41)
MAGNESIUM SERPL-MCNC: 2 MG/DL (ref 1.5–2.5)
MCH RBC QN AUTO: 31.8 PG (ref 27–33)
MCHC RBC AUTO-ENTMCNC: 34.5 G/DL (ref 33.6–35)
MCV RBC AUTO: 92.2 FL (ref 81.4–97.8)
MONOCYTES # BLD AUTO: 0.76 K/UL (ref 0–0.85)
MONOCYTES NFR BLD AUTO: 6.5 % (ref 0–13.4)
NEUTROPHILS # BLD AUTO: 8.24 K/UL (ref 2–7.15)
NEUTROPHILS NFR BLD: 70.9 % (ref 44–72)
NRBC # BLD AUTO: 0 K/UL
NRBC BLD-RTO: 0 /100 WBC
PLATELET # BLD AUTO: 237 K/UL (ref 164–446)
PMV BLD AUTO: 9.8 FL (ref 9–12.9)
POTASSIUM SERPL-SCNC: 3.4 MMOL/L (ref 3.6–5.5)
PROTHROMBIN TIME: 13.3 SEC (ref 12–14.6)
RBC # BLD AUTO: 4.37 M/UL (ref 4.2–5.4)
SARS-COV+SARS-COV-2 AG RESP QL IA.RAPID: NOTDETECTED
SODIUM SERPL-SCNC: 139 MMOL/L (ref 135–145)
SPECIMEN SOURCE: NORMAL
WBC # BLD AUTO: 11.6 K/UL (ref 4.8–10.8)

## 2021-02-25 PROCEDURE — 93005 ELECTROCARDIOGRAM TRACING: CPT

## 2021-02-25 PROCEDURE — 85610 PROTHROMBIN TIME: CPT

## 2021-02-25 PROCEDURE — 83735 ASSAY OF MAGNESIUM: CPT

## 2021-02-25 PROCEDURE — G0378 HOSPITAL OBSERVATION PER HR: HCPCS

## 2021-02-25 PROCEDURE — 87426 SARSCOV CORONAVIRUS AG IA: CPT

## 2021-02-25 PROCEDURE — C9803 HOPD COVID-19 SPEC COLLECT: HCPCS | Performed by: EMERGENCY MEDICINE

## 2021-02-25 PROCEDURE — 85025 COMPLETE CBC W/AUTO DIFF WBC: CPT

## 2021-02-25 PROCEDURE — 99285 EMERGENCY DEPT VISIT HI MDM: CPT

## 2021-02-25 PROCEDURE — 36415 COLL VENOUS BLD VENIPUNCTURE: CPT

## 2021-02-25 PROCEDURE — 99220 PR INITIAL OBSERVATION CARE,LEVL III: CPT | Mod: AI | Performed by: STUDENT IN AN ORGANIZED HEALTH CARE EDUCATION/TRAINING PROGRAM

## 2021-02-25 PROCEDURE — 83036 HEMOGLOBIN GLYCOSYLATED A1C: CPT

## 2021-02-25 PROCEDURE — 93005 ELECTROCARDIOGRAM TRACING: CPT | Performed by: EMERGENCY MEDICINE

## 2021-02-25 PROCEDURE — 80048 BASIC METABOLIC PNL TOTAL CA: CPT

## 2021-02-25 PROCEDURE — U0005 INFEC AGEN DETEC AMPLI PROBE: HCPCS

## 2021-02-25 PROCEDURE — U0003 INFECTIOUS AGENT DETECTION BY NUCLEIC ACID (DNA OR RNA); SEVERE ACUTE RESPIRATORY SYNDROME CORONAVIRUS 2 (SARS-COV-2) (CORONAVIRUS DISEASE [COVID-19]), AMPLIFIED PROBE TECHNIQUE, MAKING USE OF HIGH THROUGHPUT TECHNOLOGIES AS DESCRIBED BY CMS-2020-01-R: HCPCS

## 2021-02-25 RX ORDER — ASPIRIN 81 MG/1
81 TABLET, CHEWABLE ORAL DAILY
Status: DISCONTINUED | OUTPATIENT
Start: 2021-02-26 | End: 2021-02-26 | Stop reason: HOSPADM

## 2021-02-25 RX ORDER — ONDANSETRON 2 MG/ML
4 INJECTION INTRAMUSCULAR; INTRAVENOUS EVERY 4 HOURS PRN
Status: DISCONTINUED | OUTPATIENT
Start: 2021-02-25 | End: 2021-02-26 | Stop reason: HOSPADM

## 2021-02-25 RX ORDER — ATORVASTATIN CALCIUM 40 MG/1
40 TABLET, FILM COATED ORAL EVERY EVENING
Status: DISCONTINUED | OUTPATIENT
Start: 2021-02-26 | End: 2021-02-26 | Stop reason: HOSPADM

## 2021-02-25 RX ORDER — SODIUM CHLORIDE, SODIUM LACTATE, POTASSIUM CHLORIDE, CALCIUM CHLORIDE 600; 310; 30; 20 MG/100ML; MG/100ML; MG/100ML; MG/100ML
INJECTION, SOLUTION INTRAVENOUS CONTINUOUS
Status: DISCONTINUED | OUTPATIENT
Start: 2021-02-25 | End: 2021-02-26 | Stop reason: HOSPADM

## 2021-02-25 RX ORDER — ACETAMINOPHEN 325 MG/1
650 TABLET ORAL EVERY 6 HOURS PRN
Status: DISCONTINUED | OUTPATIENT
Start: 2021-02-25 | End: 2021-02-26 | Stop reason: HOSPADM

## 2021-02-25 RX ORDER — POLYETHYLENE GLYCOL 3350 17 G/17G
1 POWDER, FOR SOLUTION ORAL
Status: DISCONTINUED | OUTPATIENT
Start: 2021-02-25 | End: 2021-02-26 | Stop reason: HOSPADM

## 2021-02-25 RX ORDER — BISACODYL 10 MG
10 SUPPOSITORY, RECTAL RECTAL
Status: DISCONTINUED | OUTPATIENT
Start: 2021-02-25 | End: 2021-02-26 | Stop reason: HOSPADM

## 2021-02-25 RX ORDER — ONDANSETRON 4 MG/1
4 TABLET, ORALLY DISINTEGRATING ORAL EVERY 4 HOURS PRN
Status: DISCONTINUED | OUTPATIENT
Start: 2021-02-25 | End: 2021-02-26 | Stop reason: HOSPADM

## 2021-02-25 RX ORDER — ENALAPRILAT 1.25 MG/ML
1.25 INJECTION INTRAVENOUS EVERY 6 HOURS PRN
Status: DISCONTINUED | OUTPATIENT
Start: 2021-02-25 | End: 2021-02-26 | Stop reason: HOSPADM

## 2021-02-25 RX ORDER — AMOXICILLIN 250 MG
2 CAPSULE ORAL 2 TIMES DAILY
Status: DISCONTINUED | OUTPATIENT
Start: 2021-02-25 | End: 2021-02-26 | Stop reason: HOSPADM

## 2021-02-25 ASSESSMENT — ENCOUNTER SYMPTOMS
NAUSEA: 1
VOMITING: 1
WEAKNESS: 1
DIZZINESS: 1

## 2021-02-25 ASSESSMENT — FIBROSIS 4 INDEX: FIB4 SCORE: 2.19

## 2021-02-26 ENCOUNTER — APPOINTMENT (OUTPATIENT)
Dept: RADIOLOGY | Facility: MEDICAL CENTER | Age: 81
End: 2021-02-26
Attending: EMERGENCY MEDICINE
Payer: MEDICARE

## 2021-02-26 ENCOUNTER — APPOINTMENT (OUTPATIENT)
Dept: CARDIOLOGY | Facility: MEDICAL CENTER | Age: 81
End: 2021-02-26
Attending: STUDENT IN AN ORGANIZED HEALTH CARE EDUCATION/TRAINING PROGRAM
Payer: MEDICARE

## 2021-02-26 VITALS
DIASTOLIC BLOOD PRESSURE: 65 MMHG | TEMPERATURE: 98.7 F | OXYGEN SATURATION: 97 % | HEIGHT: 62 IN | BODY MASS INDEX: 23.85 KG/M2 | SYSTOLIC BLOOD PRESSURE: 144 MMHG | HEART RATE: 72 BPM | WEIGHT: 129.63 LBS | RESPIRATION RATE: 18 BRPM

## 2021-02-26 PROBLEM — R53.81 DEBILITY: Status: RESOLVED | Noted: 2021-02-26 | Resolved: 2021-02-26

## 2021-02-26 PROBLEM — R53.81 DEBILITY: Status: ACTIVE | Noted: 2021-02-26

## 2021-02-26 PROBLEM — R55 SYNCOPE: Status: RESOLVED | Noted: 2021-02-26 | Resolved: 2021-02-26

## 2021-02-26 PROBLEM — R11.2 N&V (NAUSEA AND VOMITING): Status: RESOLVED | Noted: 2021-02-25 | Resolved: 2021-02-26

## 2021-02-26 PROBLEM — R55 SYNCOPE: Status: ACTIVE | Noted: 2021-02-26

## 2021-02-26 LAB
ALBUMIN SERPL BCP-MCNC: 3.6 G/DL (ref 3.2–4.9)
ALBUMIN/GLOB SERPL: 1.6 G/DL
ALP SERPL-CCNC: 75 U/L (ref 30–99)
ALT SERPL-CCNC: 11 U/L (ref 2–50)
ANION GAP SERPL CALC-SCNC: 8 MMOL/L (ref 7–16)
AST SERPL-CCNC: 14 U/L (ref 12–45)
BASOPHILS # BLD AUTO: 0.2 % (ref 0–1.8)
BASOPHILS # BLD: 0.04 K/UL (ref 0–0.12)
BILIRUB SERPL-MCNC: 0.6 MG/DL (ref 0.1–1.5)
BUN SERPL-MCNC: 26 MG/DL (ref 8–22)
CALCIUM SERPL-MCNC: 9.2 MG/DL (ref 8.5–10.5)
CHLORIDE SERPL-SCNC: 104 MMOL/L (ref 96–112)
CHOLEST SERPL-MCNC: 137 MG/DL (ref 100–199)
CO2 SERPL-SCNC: 27 MMOL/L (ref 20–33)
CREAT SERPL-MCNC: 0.77 MG/DL (ref 0.5–1.4)
D DIMER PPP IA.FEU-MCNC: 0.35 UG/ML (FEU) (ref 0–0.5)
EOSINOPHIL # BLD AUTO: 0.02 K/UL (ref 0–0.51)
EOSINOPHIL NFR BLD: 0.1 % (ref 0–6.9)
ERYTHROCYTE [DISTWIDTH] IN BLOOD BY AUTOMATED COUNT: 43.5 FL (ref 35.9–50)
GLOBULIN SER CALC-MCNC: 2.3 G/DL (ref 1.9–3.5)
GLUCOSE SERPL-MCNC: 102 MG/DL (ref 65–99)
HCT VFR BLD AUTO: 36.1 % (ref 37–47)
HDLC SERPL-MCNC: 69 MG/DL
HGB BLD-MCNC: 12.3 G/DL (ref 12–16)
IMM GRANULOCYTES # BLD AUTO: 0.13 K/UL (ref 0–0.11)
IMM GRANULOCYTES NFR BLD AUTO: 0.8 % (ref 0–0.9)
LDLC SERPL CALC-MCNC: 59 MG/DL
LV EJECT FRACT  99904: 60
LV EJECT FRACT MOD 2C 99903: 58.25
LV EJECT FRACT MOD 4C 99902: 66.66
LV EJECT FRACT MOD BP 99901: 61.58
LYMPHOCYTES # BLD AUTO: 1.51 K/UL (ref 1–4.8)
LYMPHOCYTES NFR BLD: 9.2 % (ref 22–41)
MCH RBC QN AUTO: 31.6 PG (ref 27–33)
MCHC RBC AUTO-ENTMCNC: 34.1 G/DL (ref 33.6–35)
MCV RBC AUTO: 92.8 FL (ref 81.4–97.8)
MONOCYTES # BLD AUTO: 0.83 K/UL (ref 0–0.85)
MONOCYTES NFR BLD AUTO: 5 % (ref 0–13.4)
NEUTROPHILS # BLD AUTO: 13.93 K/UL (ref 2–7.15)
NEUTROPHILS NFR BLD: 84.7 % (ref 44–72)
NRBC # BLD AUTO: 0 K/UL
NRBC BLD-RTO: 0 /100 WBC
PLATELET # BLD AUTO: 196 K/UL (ref 164–446)
PMV BLD AUTO: 9.6 FL (ref 9–12.9)
POTASSIUM SERPL-SCNC: 4.4 MMOL/L (ref 3.6–5.5)
PROT SERPL-MCNC: 5.9 G/DL (ref 6–8.2)
RBC # BLD AUTO: 3.89 M/UL (ref 4.2–5.4)
SARS-COV-2 RNA RESP QL NAA+PROBE: NOTDETECTED
SODIUM SERPL-SCNC: 139 MMOL/L (ref 135–145)
SPECIMEN SOURCE: NORMAL
TRIGL SERPL-MCNC: 43 MG/DL (ref 0–149)
WBC # BLD AUTO: 16.5 K/UL (ref 4.8–10.8)

## 2021-02-26 PROCEDURE — G0378 HOSPITAL OBSERVATION PER HR: HCPCS

## 2021-02-26 PROCEDURE — 93325 DOPPLER ECHO COLOR FLOW MAPG: CPT | Mod: 26 | Performed by: INTERNAL MEDICINE

## 2021-02-26 PROCEDURE — 96374 THER/PROPH/DIAG INJ IV PUSH: CPT | Mod: XU

## 2021-02-26 PROCEDURE — 700105 HCHG RX REV CODE 258: Performed by: STUDENT IN AN ORGANIZED HEALTH CARE EDUCATION/TRAINING PROGRAM

## 2021-02-26 PROCEDURE — 700111 HCHG RX REV CODE 636 W/ 250 OVERRIDE (IP): Performed by: STUDENT IN AN ORGANIZED HEALTH CARE EDUCATION/TRAINING PROGRAM

## 2021-02-26 PROCEDURE — 80061 LIPID PANEL: CPT

## 2021-02-26 PROCEDURE — 36415 COLL VENOUS BLD VENIPUNCTURE: CPT

## 2021-02-26 PROCEDURE — 97161 PT EVAL LOW COMPLEX 20 MIN: CPT

## 2021-02-26 PROCEDURE — 80053 COMPREHEN METABOLIC PANEL: CPT

## 2021-02-26 PROCEDURE — 96372 THER/PROPH/DIAG INJ SC/IM: CPT | Mod: XU

## 2021-02-26 PROCEDURE — 93321 DOPPLER ECHO F-UP/LMTD STD: CPT | Mod: 26 | Performed by: INTERNAL MEDICINE

## 2021-02-26 PROCEDURE — 99217 PR OBSERVATION CARE DISCHARGE: CPT | Performed by: INTERNAL MEDICINE

## 2021-02-26 PROCEDURE — 93308 TTE F-UP OR LMTD: CPT | Mod: 26 | Performed by: INTERNAL MEDICINE

## 2021-02-26 PROCEDURE — 93325 DOPPLER ECHO COLOR FLOW MAPG: CPT

## 2021-02-26 PROCEDURE — 85025 COMPLETE CBC W/AUTO DIFF WBC: CPT

## 2021-02-26 PROCEDURE — 85379 FIBRIN DEGRADATION QUANT: CPT

## 2021-02-26 PROCEDURE — 97165 OT EVAL LOW COMPLEX 30 MIN: CPT

## 2021-02-26 PROCEDURE — A9270 NON-COVERED ITEM OR SERVICE: HCPCS | Performed by: STUDENT IN AN ORGANIZED HEALTH CARE EDUCATION/TRAINING PROGRAM

## 2021-02-26 PROCEDURE — 70551 MRI BRAIN STEM W/O DYE: CPT | Mod: MG

## 2021-02-26 PROCEDURE — 700102 HCHG RX REV CODE 250 W/ 637 OVERRIDE(OP): Performed by: STUDENT IN AN ORGANIZED HEALTH CARE EDUCATION/TRAINING PROGRAM

## 2021-02-26 RX ORDER — MECLIZINE HCL 12.5 MG/1
12.5 TABLET ORAL 3 TIMES DAILY PRN
Qty: 30 TABLET | Refills: 0 | Status: SHIPPED | OUTPATIENT
Start: 2021-02-26 | End: 2021-12-13

## 2021-02-26 RX ORDER — PROMETHAZINE HYDROCHLORIDE 25 MG/1
25 TABLET ORAL EVERY 6 HOURS PRN
Status: DISCONTINUED | OUTPATIENT
Start: 2021-02-26 | End: 2021-02-26 | Stop reason: HOSPADM

## 2021-02-26 RX ORDER — ATENOLOL 25 MG/1
12.5 TABLET ORAL EVERY EVENING
Status: DISCONTINUED | OUTPATIENT
Start: 2021-02-26 | End: 2021-02-26 | Stop reason: HOSPADM

## 2021-02-26 RX ADMIN — DOCUSATE SODIUM 50 MG AND SENNOSIDES 8.6 MG 2 TABLET: 8.6; 5 TABLET, FILM COATED ORAL at 06:01

## 2021-02-26 RX ADMIN — ASPIRIN 81 MG: 81 TABLET, CHEWABLE ORAL at 06:02

## 2021-02-26 RX ADMIN — ONDANSETRON 4 MG: 2 INJECTION INTRAMUSCULAR; INTRAVENOUS at 00:29

## 2021-02-26 RX ADMIN — ENOXAPARIN SODIUM 40 MG: 40 INJECTION SUBCUTANEOUS at 06:02

## 2021-02-26 RX ADMIN — SODIUM CHLORIDE, POTASSIUM CHLORIDE, SODIUM LACTATE AND CALCIUM CHLORIDE: 600; 310; 30; 20 INJECTION, SOLUTION INTRAVENOUS at 00:29

## 2021-02-26 ASSESSMENT — COGNITIVE AND FUNCTIONAL STATUS - GENERAL
CLIMB 3 TO 5 STEPS WITH RAILING: A LITTLE
DAILY ACTIVITIY SCORE: 23
MOBILITY SCORE: 23
HELP NEEDED FOR BATHING: A LITTLE
SUGGESTED CMS G CODE MODIFIER MOBILITY: CI
SUGGESTED CMS G CODE MODIFIER DAILY ACTIVITY: CI

## 2021-02-26 ASSESSMENT — LIFESTYLE VARIABLES
EVER HAD A DRINK FIRST THING IN THE MORNING TO STEADY YOUR NERVES TO GET RID OF A HANGOVER: NO
TOTAL SCORE: 0
AVERAGE NUMBER OF DAYS PER WEEK YOU HAVE A DRINK CONTAINING ALCOHOL: 0
ALCOHOL_USE: NO
CONSUMPTION TOTAL: NEGATIVE
HOW MANY TIMES IN THE PAST YEAR HAVE YOU HAD 5 OR MORE DRINKS IN A DAY: 0
EVER FELT BAD OR GUILTY ABOUT YOUR DRINKING: NO
ON A TYPICAL DAY WHEN YOU DRINK ALCOHOL HOW MANY DRINKS DO YOU HAVE: 0
TOTAL SCORE: 0
TOTAL SCORE: 0
DOES PATIENT WANT TO STOP DRINKING: NO
HAVE YOU EVER FELT YOU SHOULD CUT DOWN ON YOUR DRINKING: NO
HAVE PEOPLE ANNOYED YOU BY CRITICIZING YOUR DRINKING: NO

## 2021-02-26 ASSESSMENT — FIBROSIS 4 INDEX
FIB4 SCORE: 2.41
FIB4 SCORE: 2.41

## 2021-02-26 ASSESSMENT — GAIT ASSESSMENTS
DISTANCE (FEET): 200
GAIT LEVEL OF ASSIST: SUPERVISED

## 2021-02-26 ASSESSMENT — PATIENT HEALTH QUESTIONNAIRE - PHQ9
1. LITTLE INTEREST OR PLEASURE IN DOING THINGS: NOT AT ALL
2. FEELING DOWN, DEPRESSED, IRRITABLE, OR HOPELESS: NOT AT ALL
SUM OF ALL RESPONSES TO PHQ9 QUESTIONS 1 AND 2: 0

## 2021-02-26 ASSESSMENT — PAIN DESCRIPTION - PAIN TYPE
TYPE: ACUTE PAIN
TYPE: ACUTE PAIN

## 2021-02-26 ASSESSMENT — ACTIVITIES OF DAILY LIVING (ADL): TOILETING: INDEPENDENT

## 2021-02-26 NOTE — INFECTION CONTROL
This patient is considered COVID RECOVERED.    Patient initially tested positive for COVID on 9/11/2020.  Patient is greater than or equal to 21 days from symptom onset and/or positive test, with symptom improvement.  Per the CDC guidance, this patient no longer requires transmission based precautions.  Patient may be placed on any unit per the bed assignment policy (except CNU), including placement in a semi-private room with a roommate.

## 2021-02-26 NOTE — THERAPY
Occupational Therapy   Initial Evaluation     Patient Name: Elvie Otero  Age:  80 y.o., Sex:  female  Medical Record #: [id number]  Today's Date: 2/26/2021     Precautions  Precautions: Fall Risk    Assessment  Patient is 80 y.o. female admitted for N/V and near syncope/dizziness. Completed ADLs/txfs with SPV and functional ambulation w/o AD and SPV. No c/o dizziness increasing during activity or LOBs, and no nystagmus. Reports has friends who can assist intermittently PRN. Reports no concerns about going home. Patient will not be actively followed for occupational therapy services at this time, however may be seen if requested by physician for 1 more visit within 30 days to address any discharge or equipment needs.     Plan    Recommend Occupational Therapy d/c needs only    DC Equipment Recommendations: None  Discharge Recommendations: Anticipate that the patient will have no further occupational therapy needs after discharge from the hospital        Objective     02/26/21 0852   Prior Living Situation   Prior Services Home-Independent   Housing / Facility 1 Story House   Steps Into Home   (flight)   Bathroom Set up Walk In Shower;Shower Chair   Equipment Owned Tub / Shower Seat   Lives with - Patient's Self Care Capacity Alone and Able to Care For Self   Comments Reports lives on first floor and doesn't have to go downstairs. Reports has friends who can assist PRN.   Prior Level of ADL Function   Self Feeding Independent   Grooming / Hygiene Independent   Bathing Independent   Dressing Independent   Toileting Independent   Prior Level of IADL Function   Medication Management Independent   Laundry Independent   Kitchen Mobility Independent   Finances Independent   Home Management Independent   Shopping Independent   Prior Level Of Mobility Independent Without Device in Community;Independent Without Device in Home   Driving / Transportation Driving Independent   Occupation (Pre-Hospital Vocational) Employed  Full Time;Requires Sitting, Desk, Computer Tasks   Vitals   O2 (LPM) 2   O2 Delivery Device Nasal Cannula   Pain 0 - 10 Group   Therapist Pain Assessment Post Activity Pain Same as Prior to Activity;During Activity;Nurse Notified  (no c/o pain)   Cognition    Cognition / Consciousness WDL   Comments pleasant and cooperative   Passive ROM Upper Body   Passive ROM Upper Body WDL   Active ROM Upper Body   Active ROM Upper Body  WDL   Strength Upper Body   Upper Body Strength  WDL   Sensation Upper Body   Upper Extremity Sensation  WDL   Balance Assessment   Sitting Balance (Static) Good   Sitting Balance (Dynamic) Good   Standing Balance (Static) Good   Standing Balance (Dynamic) Good   Weight Shift Sitting Good   Weight Shift Standing Good   Bed Mobility    Supine to Sit Supervised   Sit to Supine Supervised   Scooting Supervised   Rolling Supervised   ADL Assessment   Grooming Supervision;Standing  (washing hands)   Lower Body Dressing Supervision   Toileting Supervision   Functional Mobility   Sit to Stand Supervised   Bed, Chair, Wheelchair Transfer Supervised   Toilet Transfers Supervised   Transfer Method Stand Step   Mobility w/o AD in room and hallway   Visual Perception   Visual Perception  WDL   Comments no nystagmus   Edema / Skin Assessment   Edema / Skin  Not Assessed   Activity Tolerance   Sitting in Chair 2 min on toilet   Sitting Edge of Bed 10 min   Standing 8 min   Comments no c/o dizziness/fatigue   Anticipated Discharge Equipment and Recommendations   DC Equipment Recommendations None   Discharge Recommendations Anticipate that the patient will have no further occupational therapy needs after discharge from the hospital

## 2021-02-26 NOTE — CARE PLAN
Problem: Communication  Goal: The ability to communicate needs accurately and effectively will improve  Outcome: PROGRESSING AS EXPECTED     Problem: Safety  Goal: Will remain free from injury  Outcome: PROGRESSING AS EXPECTED  Note: Bed locked in lowest position. Bed alarm in use. Call light within reach.     Problem: Pain Management  Goal: Pain level will decrease to patient's comfort goal  Outcome: PROGRESSING AS EXPECTED     Problem: Mobility  Goal: Risk for activity intolerance will decrease  Outcome: PROGRESSING AS EXPECTED

## 2021-02-26 NOTE — THERAPY
Physical Therapy   Initial Evaluation     Patient Name: Elvie Otero  Age:  80 y.o., Sex:  female  Medical Record #: [id number]  Today's Date: 2/26/2021          Assessment  Patient is 80 y.o. female admitted for dizziness workup presenting to PT without gross mobility impairment. She reported dizziness while seated at EOB, but denied an increase during DixHallpike and Head Thrust tests (also, no nystagmus noted). Pt completed x200' of gait without device as well as bed mobility and transfers without assist. At this time, she does not require further acute PT intervention and appears functionally capable of return home.       Plan    Recommend Physical Therapy for Evaluation only     DC Equipment Recommendations: None  Discharge Recommendations: Anticipate that the patient will have no further physical therapy needs after discharge from the hospital        Objective       02/26/21 0841   Prior Living Situation   Prior Services None   Housing / Facility 2 Story House   Steps Into Home   (flight)   Steps In Home 0   Equipment Owned None   Lives with - Patient's Self Care Capacity Alone and Able to Care For Self   Comments pt reports there are stairs to her lower level where she rarely goes. Main level is first level of home.    Prior Level of Functional Mobility   Bed Mobility Independent   Transfer Status Independent   Ambulation Independent   Distance Ambulation (Feet)   (community)   Assistive Devices Used None   Stairs Independent   Gait Analysis   Gait Level Of Assist Supervised   Assistive Device None   Distance (Feet) 200   Weight Bearing Status FWB   Bed Mobility    Supine to Sit Supervised   Sit to Supine Independent   Scooting Supervised   Rolling Supervised   Comments Performed DixHallpike and Head Thrust testing; negative for nystagmus to R and L sides.    Functional Mobility   Sit to Stand Supervised   Bed, Chair, Wheelchair Transfer Supervised   Transfer Method Stand Step   Anticipated Discharge  Equipment and Recommendations   DC Equipment Recommendations None   Discharge Recommendations Anticipate that the patient will have no further physical therapy needs after discharge from the hospital

## 2021-02-26 NOTE — ED TRIAGE NOTES
Elvie Otero  80 y.o. female  Chief Complaint   Patient presents with   • N/V     PT BIB [facility] after experiencing N/V and feeling faint for the last 1 hour. Denies pain at this time. Denies LOC. [facility] reports ST depression on 12 lead.   • Near Syncopal       Vitals:    02/25/21 1937   BP: 134/66   Pulse: 93   Resp: 15   SpO2: 99%

## 2021-02-26 NOTE — ASSESSMENT & PLAN NOTE
Frail, elderly female lives alone  Abrasions at left wrist  OT/PT  Consider home health at discharge  Fall precautions in place

## 2021-02-26 NOTE — ED NOTES
"Med Rec completed per patient at bedside  Allergies reviewed:Sulfa \"makes me sick\"  No ORAL antibiotics in last 14 days        Medication Sig Comments   • atorvastatin (LIPITOR) 40 MG Tab Take 1 Tab by mouth every evening.    • atenolol (TENORMIN) 25 MG Tab Take 12.5 mg by mouth every evening.   (1/2 tablet = 12.5 mg)    • aspirin (ASA) 81 MG Chew Tab chewable tablet Chew 1 Tab every day.    • acetaminophen (TYLENOL) 500 MG Tab Take 1,000 mg by mouth 1 time a day as needed (Shoulder Pain).        Patient denies any other prescription or OTC medications.      "

## 2021-02-26 NOTE — ASSESSMENT & PLAN NOTE
Patient with sudden onset LOC today  Denies hitting her head  Does not remember events surrounding vertigo onset, however she lives alone and has new abrasions at left wrist  MRI brain ordered  Fall precautions in place  Echo ordered  EKG showing NSR no changes from prior noted

## 2021-02-26 NOTE — ED PROVIDER NOTES
ED Provider Note    Scribed for Lio Arellano M.D. by Martin Doran. 2/25/2021  7:49 PM    Primary care provider: Jaime Merino M.D.  Means of arrival: EMS  History obtained from: patient  History limited by: none    CHIEF COMPLAINT  Chief Complaint   Patient presents with   • N/V     PT BIB [facility] after experiencing N/V and feeling faint for the last 1 hour. Denies pain at this time. Denies LOC. [facility] reports ST depression on 12 lead.   • Near Syncopal       HPI  Elvie Otero is a 80 y.o. female who presents to the Emergency Department via EMS with complaints of profound vertigo acute onset directly prior to arrival. She does describe the symptoms as 'the room spinning', and she is unable to walk. She fell to the floor when her symptoms began but did not injure her head. She states she drank a vodka tonic before her symptoms started, but she notes that she will typically have one drink a week. She reports associated vomiting. Her dizziness was mostly constant until EMS arrived. Her symptoms worsen when she opens her eyes. She denies any headache, diplopia, facial droop, tinnitus, hearing changes; denies any other viral symptoms such as fever, cough, or shortness of breath. She does report feeling slightly weak in general.  No prior similar episodes. She has not had any recent head injuries. Past medical history includes hypertension and a prior MI with a stent. She states she will drink about once per week. No smoking. No family history of heart disease. No personal history of DM, stroke, or smoking. She is not anticoagulated.    REVIEW OF SYSTEMS  Pertinent positives include: vertigo and vomiting.  Pertinent negatives include: headache, diplopia, facial droop, tinnitus, hearing changes, fever, cough, or shortness of breath.  10+ systems reviewed and negative.      PAST MEDICAL HISTORY  Past Medical History:   Diagnosis Date   • Heart burn    • Hypertension        FAMILY HISTORY  No family history  "noted.    SOCIAL HISTORY  Social History     Tobacco Use   • Smoking status: Never Smoker   • Smokeless tobacco: Never Used   Substance Use Topics   • Alcohol use: Yes     Alcohol/week: 1.2 oz     Types: 2 Shots of liquor per week     Comment: 1 per week   • Drug use: No     Social History     Substance and Sexual Activity   Drug Use No       SURGICAL HISTORY  Past Surgical History:   Procedure Laterality Date   • RHYTIDECTOMY  10/15/2012    Performed by Nelsy Sun M.D. at SURGERY [facility]   • BLEPHAROPLASTY  10/15/2012    Performed by Nelsy Sun M.D. at SURGERY [facility]   • CHOLECYSTECTOMY  2004   • HYSTERECTOMY LAPAROSCOPY  1975       CURRENT MEDICATIONS  Home Medications     Reviewed by Zahra Gloria R.N. (Registered Nurse) on 02/25/21 at 1936  Med List Status: Complete   Medication Last Dose Status   acetaminophen (TYLENOL) 500 MG Tab  Active   aspirin (ASA) 81 MG Chew Tab chewable tablet  Active   atenolol (TENORMIN) 25 MG Tab  Active   atorvastatin (LIPITOR) 40 MG Tab  Active                ALLERGIES  Allergies   Allergen Reactions   • Sulfa Drugs Vomiting       PHYSICAL EXAM  VITAL SIGNS: /66   Pulse 93   Temp 36.3 °C (97.4 °F) (Temporal)   Resp 15   Ht 1.575 m (5' 2\")   Wt 63.5 kg (140 lb)   SpO2 99%   BMI 25.61 kg/m²   Reviewed and high normal blood pressure  Constitutional: Well developed, Well nourished, Mild distress.  HENT: Normocephalic, atraumatic, bilateral external ears normal, right ear is full of cerumen, left ear is normal with normal landmarks, wearing a mask.   Eyes: PERRLA, conjunctiva pink, no scleral icterus.   Cardiovascular: Regular rate and rhythm. No murmurs, rubs or gallops. No bruit or JVD. No dependent edema or calf tenderness  Respiratory: Lungs clear to auscultation bilaterally. No wheezes, rales, or rhonchi.  Abdominal:  Abdomen soft, non-tender, non distended. No rebound, or guarding.    Skin: No erythema, no rash. "   Genitourinary: No costovertebral angle tenderness.   Musculoskeletal: no deformities.   Neurologic: Alert & oriented x 3, Cranial nerves II-XII are intact, Grasp, biceps, extensor hallucis longus, ankle plantar flexion are 5/5 and symmetric, Finger nose finger and fine motor normal. No nystagmus. Sensation is intact to light touch in all 4 limbs.  No focal deficits noted. No ataxia.  Psychiatric: Affect normal, Judgment normal, Mood normal.     DIFFERENTIAL DIAGNOSIS:  Cerebellar hemorrhage, posterior circulation stroke, TIA, acoustic neuroma, peripheral vertigo.    EKG Interpretation:  Interpreted by me    Rhythm:  Normal sinus rhythm   Rate: Normal at 93  Axis: normal  Ectopy: none  Conduction: normal  ST Segments: no acute change  T Waves: no acute change  Q Waves: Inferior  Clinical Impression: Normal sinus rhythm with prior inferior ischemia    RADIOLOGY/PROCEDURES  MR-BRAIN-W/O    (Results Pending)     Radiologist interpretation have been reviewed by me.     LABORATORY:  Results for orders placed or performed during the hospital encounter of 02/25/21   CBC WITH DIFFERENTIAL   Result Value Ref Range    WBC 11.6 (H) 4.8 - 10.8 K/uL    RBC 4.37 4.20 - 5.40 M/uL    Hemoglobin 13.9 12.0 - 16.0 g/dL    Hematocrit 40.3 37.0 - 47.0 %    MCV 92.2 81.4 - 97.8 fL    MCH 31.8 27.0 - 33.0 pg    MCHC 34.5 33.6 - 35.0 g/dL    RDW 43.2 35.9 - 50.0 fL    Platelet Count 237 164 - 446 K/uL    MPV 9.8 9.0 - 12.9 fL    Neutrophils-Polys 70.90 44.00 - 72.00 %    Lymphocytes 19.90 (L) 22.00 - 41.00 %    Monocytes 6.50 0.00 - 13.40 %    Eosinophils 0.90 0.00 - 6.90 %    Basophils 0.30 0.00 - 1.80 %    Immature Granulocytes 1.50 (H) 0.00 - 0.90 %    Nucleated RBC 0.00 /100 WBC    Neutrophils (Absolute) 8.24 (H) 2.00 - 7.15 K/uL    Lymphs (Absolute) 2.32 1.00 - 4.80 K/uL    Monos (Absolute) 0.76 0.00 - 0.85 K/uL    Eos (Absolute) 0.10 0.00 - 0.51 K/uL    Baso (Absolute) 0.04 0.00 - 0.12 K/uL    Immature Granulocytes (abs) 0.18 (H)  0.00 - 0.11 K/uL    NRBC (Absolute) 0.00 K/uL   Basic Metabolic Panel   Result Value Ref Range    Sodium 139 135 - 145 mmol/L    Potassium 3.4 (L) 3.6 - 5.5 mmol/L    Chloride 104 96 - 112 mmol/L    Co2 20 20 - 33 mmol/L    Glucose 165 (H) 65 - 99 mg/dL    Bun 27 (H) 8 - 22 mg/dL    Creatinine 0.99 0.50 - 1.40 mg/dL    Calcium 9.5 8.5 - 10.5 mg/dL    Anion Gap 15.0 7.0 - 16.0   Prothrombin Time   Result Value Ref Range    PT 13.3 12.0 - 14.6 sec    INR 0.98 0.87 - 1.13      Lab results reviewed by me.     ED COURSE:  Nursing notes, VS, PMSFHx reviewed in chart.     7:49 PM - Patient seen and examined at bedside. Patient declined any medications at this time. Ordered MR-Brain, CBC w/ diff, BMP, PT, and an EKG to evaluate.     11:03 PM - MRI is reluctant to scan the patient at this time as the patient had COVID in September. Plan to admit the patient for observation and an MRI scan in the morning.     11:16 PM - I discussed the patient's case and the above findings with Dr. Acosta (Hospitalist) who agrees to evaluate the patient for hospitalization.      11:18 PM - Patient was reevaluated at bedside. Discussed lab results with the patient and informed them of the plan for hospitalization for further evaluation and treatment. Patient verbalizes understanding and agreement to this plan of care.      MEDICAL DECISION MAKING:  This patient presents with severe persistent vertigo with recurrent vomiting preventing the patient from walking.  It did not have features suggestive of peripheral vertigo.  Given that she requires MRI which will be delayed.  Given recent Covid MRI staff requested another Covid test which will be done.  Case was discussed with the hospitalist who will obtain MRI and consult neurology as needed.  Patient may need further stroke risk work-up if MRI is abnormal.  Differential includes vertebrobasilar insufficiency, acoustic neuroma and less likely cerebellar hemorrhage.      PLAN:    Patient will be  hospitalized by Dr. Acosta with plan as above.    CONDITION: guarded.     FINAL IMPRESSION  1. Vertigo          IMartin (Scribe), am scribing for, and in the presence of, Lio Arellano M.D..    Electronically signed by: Martin Doran (Scribe), 2/25/2021    Lio CAMPOS M.D. personally performed the services described in this documentation, as scribed by Martin Doran in my presence, and it is both accurate and complete.    The note accurately reflects work and decisions made by me.  Lio Arellano M.D.  2/26/2021  8:54 PM

## 2021-02-26 NOTE — H&P
Hospital Medicine History & Physical Note    Date of Service  2/26/2021    Primary Care Physician  Jaime Merino M.D.    Consultants      Code Status  Full Code    Chief Complaint  Chief Complaint   Patient presents with   • N/V     PT BIB [facility] after experiencing N/V and feeling faint for the last 1 hour. Denies pain at this time. Denies LOC. [facility] reports ST depression on 12 lead.   • Near Syncopal       History of Presenting Illness  80 y.o. female with a past medical hx of HTN and prior MI with stent placement, CAD, HLD who presented 2/25/2021 with episode of syncope with LOC and confusion, and following profound vertigo acute onset directly prior to arrival. She does describe the symptoms as 'the room spinning', and she is unable to walk without assistance.  She states that she fell to the floor when her symptoms began but did not injure her head, however does not remember the event in entirety. She states she drank a vodka tonic before her symptoms started, but she notes that she will typically have one drink a week.  Her symptoms worsen when she opens her eyes.     Notable labs include: Covid positive in 09/2020  GFR 54, WBC 11.6, glucose 165, vitals stable and WNL, BUN 27    This frail, elderly female patient is admitted to the hospitalist service for medical management of severe vertigo with assoc. N/V and new onset syncope with LOC.  Awaiting MRI.        Review of Systems  Review of Systems   Gastrointestinal: Positive for nausea and vomiting.   Neurological: Positive for dizziness and weakness (generalized).   All other systems reviewed and are negative.      Past Medical History   has a past medical history of Heart burn and Hypertension. HLD, stent placement, CAD, MI    Surgical History   has a past surgical history that includes hysterectomy laparoscopy (1975); cholecystectomy (2004); rhytidectomy (10/15/2012); and blepharoplasty (10/15/2012).     Family History  Non-pertinent    Social History    reports that she has never smoked. She has never used smokeless tobacco. She reports current alcohol use of about 1.2 oz of alcohol per week. She reports that she does not use drugs. Patient has one hard liquor drink weekly.      Allergies  Allergies   Allergen Reactions   • Sulfa Drugs Vomiting       Medications  Prior to Admission Medications   Prescriptions Last Dose Informant Patient Reported? Taking?   acetaminophen (TYLENOL) 500 MG Tab   Yes No   Sig: Take 500-1,000 mg by mouth as needed.   aspirin (ASA) 81 MG Chew Tab chewable tablet   No No   Sig: Chew 1 Tab every day.   atenolol (TENORMIN) 25 MG Tab   No No   Sig: Take 0.5 Tabs by mouth every evening.   atorvastatin (LIPITOR) 40 MG Tab   No No   Sig: Take 1 Tab by mouth every evening.      Facility-Administered Medications: None       Physical Exam  Temp:  [36.3 °C (97.4 °F)-36.4 °C (97.5 °F)] 36.4 °C (97.5 °F)  Pulse:  [80-93] 91  Resp:  [15-18] 18  BP: (134-150)/(65-75) 144/73  SpO2:  [92 %-100 %] 100 %    Physical Exam     Vitals and nursing note reviewed.  Constitutional:     General: frail elderly female resting comfortably in NAD    Appearance: Pt is not ill-appearing.      HENT: No signs of trauma, Bilateral external ears normal, Nose normal.      Head: Normocephalic.     Mouth/Throat: Unremarkable. Moist Mucosa     Eyes:      Pupils: Pupils are equal round and reactive to light, Conjunctiva normal, Non-icteric.   Neck: Normal range of motion, No tenderness, Supple, No stridor.   Cardiovascular:      Rate and Rhythm: Regular Rate and Rhythm     Heart sounds: No murmur, rubs, or gallops appreciated.  Pulmonary/Thorax & Lungs:      Effort: No respiratory distress.     Breath sounds: No stridor. No wheezing, No Rhonchi, no palpable chest tenderness      Abdomen:     General: There is no distension.      Palpation/Auscultation: Soft, No tenderness, No masses,  Bowel sounds normal. No rebound/peritoneal signs.    Skin: Warm, Dry, No erythema, No rash.    left wrist with abrasions and dressing intact, skin is crepey and fragile throughout b/l UE     Coloration: Skin is not jaundiced or pale.     Musculoskeletal:         General: No swelling or tenderness.   Extremities:       General: Intact distal pulses, No edema, No tenderness, No cyanosis       Neurologic/Psych: Alert, No focal deficits noted.                   Laboratory:  Recent Labs     02/25/21 1933   WBC 11.6*   RBC 4.37   HEMOGLOBIN 13.9   HEMATOCRIT 40.3   MCV 92.2   MCH 31.8   MCHC 34.5   RDW 43.2   PLATELETCT 237   MPV 9.8     Recent Labs     02/25/21 1933   SODIUM 139   POTASSIUM 3.4*   CHLORIDE 104   CO2 20   GLUCOSE 165*   BUN 27*   CREATININE 0.99   CALCIUM 9.5     Recent Labs     02/25/21 1933   GLUCOSE 165*     Recent Labs     02/25/21 1933   INR 0.98     No results for input(s): NTPROBNP in the last 72 hours.      No results for input(s): TROPONINT in the last 72 hours.    Imaging:  MR-BRAIN-W/O    (Results Pending)   EC-ECHOCARDIOGRAM LTD W/ CONT    (Results Pending)         Assessment/Plan:  I anticipate this patient is appropriate for observation status at this time.    Debility- (present on admission)  Assessment & Plan  Frail, elderly female lives alone  Abrasions at left wrist  OT/PT  Consider home health at discharge  Fall precautions in place    Syncope- (present on admission)  Assessment & Plan  Patient with sudden onset LOC today  Denies hitting her head  Does not remember events surrounding vertigo onset, however she lives alone and has new abrasions at left wrist  MRI brain ordered  Fall precautions in place  Echo ordered  EKG showing NSR no changes from prior noted        N&V (nausea and vomiting)- (present on admission)  Assessment & Plan  Antiemetics in place   6 episodes of vomiting in ED  Currently improved      Vertigo- (present on admission)  Assessment & Plan  MRI brain ordered to assess  Fall precautions in place  N/V assoc.        Dyslipidemia, goal LDL below 70- (present  on admission)  Assessment & Plan  Continue home statin  Lipid panel ordered to assess    Essential hypertension- (present on admission)  Assessment & Plan  Continue home meds  Vitals stable and WNL on admission

## 2021-02-26 NOTE — DISCHARGE INSTRUCTIONS
Discharge Instructions    Discharged to home by car with relative. Discharged via wheelchair, hospital escort: Yes.  Special equipment needed: Not Applicable    Be sure to schedule a follow-up appointment with your primary care doctor or any specialists as instructed.     Discharge Plan:   Diet Plan: Discussed  Activity Level: Discussed  Confirmed Follow up Appointment: Patient to Call and Schedule Appointment  Confirmed Symptoms Management: Discussed  Medication Reconciliation Updated: Yes  Influenza Vaccine Indication: Not indicated: Previously immunized this influenza season and > 8 years of age    I understand that a diet low in cholesterol, fat, and sodium is recommended for good health. Unless I have been given specific instructions below for another diet, I accept this instruction as my diet prescription.   Other diet: heart healthy diet    Special Instructions: None    · Is patient discharged on Warfarin / Coumadin?   No     Depression / Suicide Risk    As you are discharged from this [facility] facility, it is important to learn how to keep safe from harming yourself.    Recognize the warning signs:  · Abrupt changes in personality, positive or negative- including increase in energy   · Giving away possessions  · Change in eating patterns- significant weight changes-  positive or negative  · Change in sleeping patterns- unable to sleep or sleeping all the time   · Unwillingness or inability to communicate  · Depression  · Unusual sadness, discouragement and loneliness  · Talk of wanting to die  · Neglect of personal appearance   · Rebelliousness- reckless behavior  · Withdrawal from people/activities they love  · Confusion- inability to concentrate     If you or a loved one observes any of these behaviors or has concerns about self-harm, here's what you can do:  · Talk about it- your feelings and reasons for harming yourself  · Remove any means that you might use to hurt yourself (examples: pills, rope,  extension cords, firearm)  · Get professional help from the community (Mental Health, Substance Abuse, psychological counseling)  · Do not be alone:Call your Safe Contact- someone whom you trust who will be there for you.  · Call your local CRISIS HOTLINE 678-7140 or 456-998-5312  · Call your local Children's Mobile Crisis Response Team [place] (828) 926-3378 or www.Pickup Services  · Call the toll free National Suicide Prevention Hotlines   · National Suicide Prevention Lifeline 093-473-JUTG (3846)  · National Hope Line Network 800-SUICIDE (024-3057)      Discharge Instructions per KENISHA Gomez        DIET: Resume previous    ACTIVITY: No restrictions    DIAGNOSIS: Vertigo    Return to ER if you experience fall with head trauma, shortness of breath, chest pain, or unilateral weakness.       Vertigo  Vertigo is the feeling that you or your surroundings are moving when they are not. This feeling can come and go at any time. Vertigo often goes away on its own. Vertigo can be dangerous if it occurs while you are doing something that could endanger you or others, such as driving or operating machinery.  Your health care provider will do tests to determine the cause of your vertigo. Tests will also help your health care provider decide how best to treat your condition.  Follow these instructions at home:  Eating and drinking         · Drink enough fluid to keep your urine pale yellow.  · Do not drink alcohol.  Activity  · Return to your normal activities as told by your health care provider. Ask your health care provider what activities are safe for you.  · In the morning, first sit up on the side of the bed. When you feel okay, stand slowly while you hold onto something until you know that your balance is fine.  · Move slowly. Avoid sudden body or head movements or certain positions, as told by your health care provider.  · If you have trouble walking or keeping your balance, try using a cane for  stability. If you feel dizzy or unstable, sit down right away.  · Avoid doing any tasks that would cause danger to you or others if vertigo occurs.  · Avoid bending down if you feel dizzy. Place items in your home so that they are easy for you to reach without leaning over.  · Do not drive or use heavy machinery if you feel dizzy.  General instructions  · Take over-the-counter and prescription medicines only as told by your health care provider.  · Keep all follow-up visits as told by your health care provider. This is important.  Contact a health care provider if:  · Your medicines do not relieve your vertigo or they make it worse.  · You have a fever.  · Your condition gets worse or you develop new symptoms.  · Your family or friends notice any behavioral changes.  · Your nausea or vomiting gets worse.  · You have numbness or a prickling and tingling sensation in part of your body.  Get help right away if you:  · Have difficulty moving or speaking.  · Are always dizzy.  · Faint.  · Develop severe headaches.  · Have weakness in your hands, arms, or legs.  · Have changes in your hearing or vision.  · Develop a stiff neck.  · Develop sensitivity to light.  Summary  · Vertigo is the feeling that you or your surroundings are moving when they are not.  · Your health care provider will do tests to determine the cause of your vertigo.  · Follow instructions for home care. You may be told to avoid certain tasks, positions, or movements.  · Contact a health care provider if your medicines do not relieve your symptoms, or if you have a fever, nausea, vomiting, or changes in behavior.  · Get help right away if you have severe headaches or difficulty speaking, or you develop hearing or vision problems.  This information is not intended to replace advice given to you by your health care provider. Make sure you discuss any questions you have with your health care provider.  Document Released: 09/27/2006 Document Revised:  11/11/2019 Document Reviewed: 11/11/2019  Elsevier Patient Education © 2020 [employer] Inc[employer]

## 2021-02-26 NOTE — PROGRESS NOTES
Echo resulted, Hanane METZGER aware; pt okay to dc. Pt DC'd. IV removed, discharge instructions provided to patient, pt verbalizes understanding. Pt states all questions have been answered. Copy of discharge paperwork provided to pt, signed copy in chart. Pt states all belongings in possession. Pt waiting for transportation home.

## 2021-02-26 NOTE — DISCHARGE PLANNING
Care Transition Team Assessment    Spoke with patient at bedside and verified all information. Lives alone and has 2 adult sons for support. One son lives in [place] and the other lives he in [place]. PCP Dr. Merino. Uses no DME at present. Uses [facility] on [address]. Son Lonny will be ride @ D/C. Anticipate no needs @ present time.    Information Source  Orientation : Oriented x 4  Information Given By: Patient    Readmission Evaluation  Is this a readmission?: No    Interdisciplinary Discharge Planning  Primary Care Physician: Noe Merino  Lives with - Patient's Self Care Capacity: Alone and Able to Care For Self  Patient or legal guardian wants to designate a caregiver: No  Support Systems: Children  Housing / Facility: [address]  Do You Take your Prescribed Medications Regularly: Yes  Able to Return to Previous ADL's: Yes  Mobility Issues: No  Prior Services: Home-Independent  Patient Prefers to be Discharged to:: Home  Assistance Needed: No  Durable Medical Equipment: Not Applicable    Discharge Preparedness  What are your discharge supports?: Child  Prior Functional Level: Ambulatory    Functional Assesment  Prior Functional Level: Ambulatory    Finances  Prescription Coverage: Yes    Anticipated Discharge Information  Discharge Address: [address] [place] [address]  Discharge Contact Phone Number: [phone]

## 2021-02-26 NOTE — DISCHARGE SUMMARY
Discharge Summary    CHIEF COMPLAINT ON ADMISSION  Chief Complaint   Patient presents with   • N/V     PT BIB [facility] after experiencing N/V and feeling faint for the last 1 hour. Denies pain at this time. Denies LOC. JACQUELIN reports ST depression on 12 lead.   • Near Syncopal       Reason for Admission  EMS 14     Admission Date  2/25/2021    CODE STATUS  Full Code    HPI & HOSPITAL COURSE  This is a 80 y.o. female here with dizziness, nausea, and vomiting. She states her symptoms started after two alcoholic beverages. She states she became profoundly dizzy and was unable to get up. She has no focal neurologic deficits. MRI head was negative for acute abnormalities. Previous echo in September 2020 is unrevealing, repeat echo done here, formal reading still in process. D-dimer negative. Her symptoms have improved this morning. She has been cleared by PT/OT for discharge home. Of note, JACQUELIN noticed ST depression prior to admission. EKG appears unchanged from previous. Patient denies chest pain, shortness of breath, and diaphoresis. She remains hemodynamically stable on room air.      Therefore, she is discharged in fair and stable condition to home with close outpatient follow-up.    The patient recovered much more quickly than anticipated on admission.    Discharge Date  2/26/2021    FOLLOW UP ITEMS POST DISCHARGE  PCP    DISCHARGE DIAGNOSES  Active Problems:    Essential hypertension POA: Yes    Dyslipidemia, goal LDL below 70 POA: Yes    Vertigo POA: Yes  Resolved Problems:    N&V (nausea and vomiting) POA: Yes    Syncope POA: Yes    Debility POA: Yes      FOLLOW UP  No future appointments.  Jaime Merino M.D.  [address] [place] [id number] [phone]    In 1 week  Hospitalization follow-up      MEDICATIONS ON DISCHARGE     Medication List      START taking these medications      Instructions   meclizine 12.5 MG Tabs  Commonly known as: ANTIVERT   Take 1 tablet by mouth 3 times a day as  needed.  Dose: 12.5 mg        CHANGE how you take these medications      Instructions   atenolol 25 MG Tabs  What changed: additional instructions  Commonly known as: TENORMIN   Take 0.5 Tabs by mouth every evening.  Dose: 12.5 mg        CONTINUE taking these medications      Instructions   acetaminophen 500 MG Tabs  Commonly known as: TYLENOL   Take 1,000 mg by mouth 1 time a day as needed (Shoulder Pain).  Dose: 1,000 mg     aspirin 81 MG Chew chewable tablet  Commonly known as: ASA   Chew 1 Tab every day.  Dose: 81 mg     atorvastatin 40 MG Tabs  Commonly known as: LIPITOR   Take 1 Tab by mouth every evening.  Dose: 40 mg            Allergies  Allergies   Allergen Reactions   • Sulfa Drugs Vomiting       DIET  Orders Placed This Encounter   Procedures   • Diet Order Diet: Cardiac     Standing Status:   Standing     Number of Occurrences:   1     Order Specific Question:   Diet:     Answer:   Cardiac [6]       ACTIVITY  As tolerated.  Weight bearing as tolerated    CONSULTATIONS  None    PROCEDURES  N/A    LABORATORY  Lab Results   Component Value Date    SODIUM 139 02/26/2021    POTASSIUM 4.4 02/26/2021    CHLORIDE 104 02/26/2021    CO2 27 02/26/2021    GLUCOSE 102 (H) 02/26/2021    BUN 26 (H) 02/26/2021    CREATININE 0.77 02/26/2021        Lab Results   Component Value Date    WBC 16.5 (H) 02/26/2021    HEMOGLOBIN 12.3 02/26/2021    HEMATOCRIT 36.1 (L) 02/26/2021    PLATELETCT 196 02/26/2021        Total time of the discharge process exceeds 35 minutes.

## 2021-03-03 ENCOUNTER — PATIENT OUTREACH (OUTPATIENT)
Dept: HEALTH INFORMATION MANAGEMENT | Facility: OTHER | Age: 81
End: 2021-03-03

## 2021-03-03 NOTE — PROGRESS NOTES
CHW Jeevan called pt to introduce CCM program. Pt declined to complete assessment at this time but appreciated the call to check in with her.     CHW will d/c pt from CCM team (declied services)

## 2021-03-31 ENCOUNTER — OFFICE VISIT (OUTPATIENT)
Dept: CARDIOLOGY | Facility: MEDICAL CENTER | Age: 81
End: 2021-03-31
Payer: MEDICARE

## 2021-03-31 VITALS
BODY MASS INDEX: 27.27 KG/M2 | RESPIRATION RATE: 16 BRPM | HEART RATE: 68 BPM | WEIGHT: 148.2 LBS | HEIGHT: 62 IN | DIASTOLIC BLOOD PRESSURE: 74 MMHG | SYSTOLIC BLOOD PRESSURE: 122 MMHG | OXYGEN SATURATION: 97 %

## 2021-03-31 DIAGNOSIS — E78.5 DYSLIPIDEMIA, GOAL LDL BELOW 70: ICD-10-CM

## 2021-03-31 DIAGNOSIS — R42 VERTIGO: ICD-10-CM

## 2021-03-31 DIAGNOSIS — M54.2 NECK PAIN: ICD-10-CM

## 2021-03-31 DIAGNOSIS — I47.10 SVT (SUPRAVENTRICULAR TACHYCARDIA) (HCC): ICD-10-CM

## 2021-03-31 DIAGNOSIS — I35.1 NONRHEUMATIC AORTIC VALVE INSUFFICIENCY: ICD-10-CM

## 2021-03-31 DIAGNOSIS — I25.10 CORONARY ARTERY DISEASE INVOLVING NATIVE CORONARY ARTERY OF NATIVE HEART WITHOUT ANGINA PECTORIS: ICD-10-CM

## 2021-03-31 DIAGNOSIS — I48.0 PAF (PAROXYSMAL ATRIAL FIBRILLATION) (HCC): ICD-10-CM

## 2021-03-31 DIAGNOSIS — Z95.5 STENTED CORONARY ARTERY: ICD-10-CM

## 2021-03-31 DIAGNOSIS — R42 DIZZINESS: ICD-10-CM

## 2021-03-31 DIAGNOSIS — Z95.5 S/P RIGHT CORONARY ARTERY (RCA) STENT PLACEMENT: ICD-10-CM

## 2021-03-31 DIAGNOSIS — I10 ESSENTIAL HYPERTENSION: ICD-10-CM

## 2021-03-31 PROCEDURE — 99214 OFFICE O/P EST MOD 30 MIN: CPT | Performed by: NURSE PRACTITIONER

## 2021-03-31 RX ORDER — ASPIRIN 81 MG/1
81 TABLET, CHEWABLE ORAL DAILY
Qty: 100 TABLET | Refills: 3 | Status: SHIPPED | OUTPATIENT
Start: 2021-03-31 | End: 2022-04-14

## 2021-03-31 RX ORDER — ATENOLOL 25 MG/1
12.5 TABLET ORAL EVERY EVENING
Qty: 45 TABLET | Refills: 3 | Status: SHIPPED | OUTPATIENT
Start: 2021-03-31 | End: 2021-11-24

## 2021-03-31 RX ORDER — ATORVASTATIN CALCIUM 40 MG/1
40 TABLET, FILM COATED ORAL EVERY EVENING
Qty: 90 TABLET | Refills: 3 | Status: SHIPPED | OUTPATIENT
Start: 2021-03-31 | End: 2021-12-13 | Stop reason: SDUPTHER

## 2021-03-31 ASSESSMENT — ENCOUNTER SYMPTOMS
PND: 0
SHORTNESS OF BREATH: 0
PALPITATIONS: 0
DIZZINESS: 1
WEIGHT LOSS: 0
ORTHOPNEA: 0
WEAKNESS: 0
CLAUDICATION: 0

## 2021-03-31 ASSESSMENT — FIBROSIS 4 INDEX: FIB4 SCORE: 1.77

## 2021-03-31 NOTE — PATIENT INSTRUCTIONS
Referral placed to physical therapy for your neck pain and for exercises for your dizziness.    You can take Tylenol as needed and directed on the bottle. You can also take (2) 200 mg Ibuprofen tablets when discomfort is particularly high with food every 8 hours.     Please call and make an appointment with Dr. Jaime Merino to discuss your recent elevated white blood cell count and ongoing dizziness.

## 2021-03-31 NOTE — PROGRESS NOTES
Chief Complaint   Patient presents with   • Atrial Fibrillation     F/V    • Coronary Artery Disease     F/V Dx: Coronary artery disease involving native coronary artery of native heart without angina pectoris       Subjective:   Alondra Otero is a 79 y.o. female who presents today with for ER follow up.     Patient was admitted on 2/25/2021 with acute onset vertigo. Initial work up showed leukocytosis and potassium of 3.4. EKG showed NST with no ischemic changes. Brain MRI was negative for acute abnormality. Her symptoms resolved and she was discharged on 2/26/2021.     Patient was last seen by myself on 1/4/2021. She was overall doing well. No changes were made to her cardiovascular regimen, patient was advised to follow up with Dr. Armstrong in one year.     Past medical history significant for hypertension, CAD S/P revascularization of the RCA (NILSA to distal RCA, BMS to ostial RCA and PTCA of PDA) on 12/11/2019 which was complicated by a contained a sending aortic dissection and PAF.     Other past medical history significant for AI, HTN and HLD.     Today patient states that she     Past Medical History:   Diagnosis Date   • Heart burn    • Hypertension      Past Surgical History:   Procedure Laterality Date   • RHYTIDECTOMY  10/15/2012    Performed by Nelsy Sun M.D. at SURGERY [facility]   • BLEPHAROPLASTY  10/15/2012    Performed by Nelsy Sun M.D. at [facility]   • CHOLECYSTECTOMY  2004   • HYSTERECTOMY LAPAROSCOPY  1975     History reviewed. No pertinent family history.  Social History     Socioeconomic History   • Marital status:      Spouse name: Not on file   • Number of children: Not on file   • Years of education: Not on file   • Highest education level: Not on file   Occupational History   • Not on file   Tobacco Use   • Smoking status: Never Smoker   • Smokeless tobacco: Never Used   Substance and Sexual Activity   • Alcohol use: Yes      Alcohol/week: 1.2 oz     Types: 2 Shots of liquor per week     Comment: 1 per week   • Drug use: No   • Sexual activity: Not Currently   Other Topics Concern   • Not on file   Social History Narrative   • Not on file     Social Determinants of Health     Financial Resource Strain: Low Risk    • Difficulty of Paying Living Expenses: Not hard at all   Food Insecurity: No Food Insecurity   • Worried About Running Out of Food in the Last Year: Never true   • Ran Out of Food in the Last Year: Never true   Transportation Needs: No Transportation Needs   • Lack of Transportation (Medical): No   • Lack of Transportation (Non-Medical): No   Physical Activity: Inactive   • Days of Exercise per Week: 0 days   • Minutes of Exercise per Session: 0 min   Stress: No Stress Concern Present   • Feeling of Stress : Not at all   Social Connections: Somewhat Isolated   • Frequency of Communication with Friends and Family: Three times a week   • Frequency of Social Gatherings with Friends and Family: Three times a week   • Attends Voodoo Services: 1 to 4 times per year   • Active Member of Clubs or Organizations: No   • Attends Club or Organization Meetings: Not asked   • Marital Status:    Intimate Partner Violence: Not At Risk   • Fear of Current or Ex-Partner: No   • Emotionally Abused: No   • Physically Abused: No   • Sexually Abused: No     Allergies   Allergen Reactions   • Sulfa Drugs Vomiting     Outpatient Encounter Medications as of 3/31/2021   Medication Sig Dispense Refill   • aspirin (ASA) 81 MG Chew Tab chewable tablet Chew 1 tablet every day. 100 tablet 3   • atorvastatin (LIPITOR) 40 MG Tab Take 1 tablet by mouth every evening. 90 tablet 3   • atenolol (TENORMIN) 25 MG Tab Take 0.5 Tablets by mouth every evening. 1/2 tablet = 12.5 mg 45 tablet 3   • meclizine (ANTIVERT) 12.5 MG Tab Take 1 tablet by mouth 3 times a day as needed. 30 tablet 0   • acetaminophen (TYLENOL) 500 MG Tab Take 1,000 mg by mouth 1 time a  "day as needed (Shoulder Pain).     • [DISCONTINUED] atorvastatin (LIPITOR) 40 MG Tab Take 1 Tab by mouth every evening. 90 Tab 3   • [DISCONTINUED] atenolol (TENORMIN) 25 MG Tab Take 0.5 Tabs by mouth every evening. (Patient taking differently: Take 12.5 mg by mouth every evening. 1/2 tablet = 12.5 mg) 90 Tab 3   • [DISCONTINUED] aspirin (ASA) 81 MG Chew Tab chewable tablet Chew 1 Tab every day. 100 Tab 3     No facility-administered encounter medications on file as of 3/31/2021.     Review of Systems   Constitutional: Positive for malaise/fatigue. Negative for weight loss.   Respiratory: Negative for shortness of breath.    Cardiovascular: Negative for chest pain, palpitations, orthopnea, claudication, leg swelling and PND.   Neurological: Positive for dizziness. Negative for weakness.   All other systems reviewed and are negative.       Objective:   /74 (BP Location: Left arm, Patient Position: Sitting, BP Cuff Size: Adult)   Pulse 68   Resp 16   Ht 1.575 m (5' 2\")   Wt 67.2 kg (148 lb 3.2 oz)   SpO2 97%   BMI 27.11 kg/m²     Physical Exam   Constitutional: She is oriented to person, place, and time. She appears well-developed and well-nourished. No distress.   HENT:   Head: Normocephalic.   Eyes: EOM are normal.   Neck: No JVD present.   Cardiovascular: Normal rate, regular rhythm and normal heart sounds.   Pulmonary/Chest: Breath sounds normal. No respiratory distress.   Abdominal: Soft. There is no abdominal tenderness.   Musculoskeletal:         General: No edema.   Neurological: She is alert and oriented to person, place, and time.   Skin: Skin is warm and dry.   Psychiatric: She has a normal mood and affect. Her behavior is normal.        Thoracoabdominal CT-CTA 12/17/19:  1.  Intramural hematoma of the ascending aorta has decreased in size and density. It extends approximately skilled nursing through the ascending aorta. No dissection flap. No aortic aneurysm.  2.  No acute abnormality in the chest, " abdomen or pelvis.  3.  Stable 2.2 cm left thyroid nodule can be further evaluated with outpatient ultrasound.  4.  Colonic diverticulosis.     Echocardiogram 12/11/19:  CONCLUSIONS  No prior study is available for comparison.   Technically difficult study.   Normal left ventricular chamber size.  Inferior wall hypokinesis.  Left ventricular systolic function is normal.  Left ventricular ejection fraction is visually estimated to be 55-60%.  Normal left ventricular wall thickness.  Normal pericardium without effusion.    Echocardiogram 9/12/2020:  CONCLUSIONS  Normal left ventricular chamber size.  Left ventricular ejection fraction is visually estimated to be 70%.  Mild to moderate aortic insufficiency.  Normal right ventricular size and systolic function.       Cardiac Catheterization 12/11/19:  Coronary artery disease     This is right dominant system.     Left main is large and without flow limiting disease.  It bifurcated into left anterior descending and left circumflex artery.      Left anterior descending artery is large caliber vessel.  It gives rises to several small  diagonal branches. Limited images was obtained but there is no signficant disease in the left anterior descending artery or its major branches seen.  The antegrade flow is normal.     Left circumflex artery is large in caliber. Limited image of the LCX was also obtained.  It gives rise to several obtuse marginal branches.  There is no significant disease in the LCX system.  The antegrade flow is normal.     Right coronary artery (RCA) is large caliber.   At debating beginning the case, the RCA was occluded distally at the crux after giving rise to several small acute marginal branches. There was also large amount of thrombus causing subtotal occlusion at the ostium of the posterior descending artery and the posterolateral branch was not visualized.     After intervention, there was no significant residual stenosis in the distal RCA or the  "posterolateral branch with residual thrombus at the ostium of the PDA with AELX II flow.      Post-operative Diagnosis:   1.  Acute inferior ST elevation microinfarction secondary to total occlusion of distal right coronary artery  2.  Second-degree AV block Mobitz type I  3.  Hypokinesis of the basal to mid inferior wall with preserved overall LV systolic function  4.  Status post stenting of the ostium and distal right coronary artery with 4 x 23 mm bare-metal stent and 2.25 x 18 mm [place] drug eluting stent respectively     Assessment:     1. Vertigo  REFERRAL TO PHYSICAL THERAPY    REFERRAL TO PHYSICAL THERAPY   2. Neck pain  REFERRAL TO PHYSICAL THERAPY    REFERRAL TO PHYSICAL THERAPY   3. S/P right coronary artery (RCA) stent placement     4. Coronary artery disease involving native coronary artery of native heart without angina pectoris     5. PAF (paroxysmal atrial fibrillation) ([facility])     6. SVT (supraventricular tachycardia) ([facility])     7. Dyslipidemia, goal LDL below 70     8. Nonrheumatic aortic valve insufficiency     9. Stented coronary artery     10. Essential hypertension     11. Dizziness         Medical Decision Making:  Today's Assessment / Status / Plan:     CAD S/P Stemi:  - S/P revascularization of the RCA (NILSA to distal RCA, BMS to ostial RCA and PTCA of PDA) on 12/11/2019 which was complicated by a contained a sending aortic dissection. Per cath report \"no significant residual stenosis of the distal RCA or the posterolateral branch with residual thrombus at the ostium of the PDA with ALEX II flow\".   - Preserved LVEF with inferior wall hypokinesis per TTE in December of 2019.   - LDL at goal (59).   - Continues to deny angina or ELLSWORTH.   - Continue ASA 81 mg daily, atenolol 12.5 mg BID and atorvastatin 40 mg daily.    Aortic Insufficiency:  - Remains mild per TTE in February, patient remains asymptomatic.   -Concerning signs and symptoms of progression reviewed with patient.     PAF:  - Originally " observed initially after STEMI and reverted on IV amio.   -13 day biotel showed 2 brief episodes of irregular SVT concerning for PAF.   - Rate remains regular upon auscultation today.  - Patient continues to decline OAC at this time.   - Continue ASA and atenolol as above.     Dizziness:  - Unable to tolerate meclizine and does not feel that it offered much relief.  - Referral placed to PT for vestibular exercises.  - Carotid US ordered.     Leukocytosis:   - Observed during recent hospitalization. Denies dysuria, fever or cough. Patient asked to please follow up with her PCP as soon as possible to further investigate this.     Patient will follow-up with myself in 6 months. Encouraged patient to contact our office should any questions or concerns arise in the meantime. Patient understands and agrees with the plan of care.     Future Appointments   Date Time Provider Department Center   4/7/2021  1:45 PM [facility] EXAM 6 [facility]   10/4/2021  7:45 AM NARGIS Matos. CB None     Please note that this dictation was created using voice recognition software. I have made every reasonable attempt to correct obvious errors, but I expect that there are errors of grammar and possibly content I did not discover before finalizing the note.

## 2021-04-01 ENCOUNTER — TELEPHONE (OUTPATIENT)
Dept: CARDIOLOGY | Facility: MEDICAL CENTER | Age: 81
End: 2021-04-01

## 2021-04-01 NOTE — TELEPHONE ENCOUNTER
JS    Pt called stating her insurance is no longer covering atenolol and Pt wants to know if there is a different medication she can take instead. Please call Pt back at [phone].    Thank you

## 2021-04-01 NOTE — TELEPHONE ENCOUNTER
Called pt to discuss. She actually picked up her refill of atenolol this afternoon with no issues. She's unsure if there may have been a miscommunication. Advised if she encounters an issue with a refill in the future, she can give us a call. Pt verbalized understanding, appreciative of call back.

## 2021-04-07 ENCOUNTER — PHYSICAL THERAPY (OUTPATIENT)
Dept: PHYSICAL THERAPY | Facility: REHABILITATION | Age: 81
End: 2021-04-07
Attending: NURSE PRACTITIONER
Payer: MEDICARE

## 2021-04-07 ENCOUNTER — HOSPITAL ENCOUNTER (OUTPATIENT)
Dept: RADIOLOGY | Facility: MEDICAL CENTER | Age: 81
End: 2021-04-07
Attending: NURSE PRACTITIONER
Payer: MEDICARE

## 2021-04-07 DIAGNOSIS — R42 DIZZINESS AND GIDDINESS: ICD-10-CM

## 2021-04-07 DIAGNOSIS — R26.89 IMBALANCE: ICD-10-CM

## 2021-04-07 PROCEDURE — 97140 MANUAL THERAPY 1/> REGIONS: CPT

## 2021-04-07 PROCEDURE — 93880 EXTRACRANIAL BILAT STUDY: CPT | Mod: 26 | Performed by: INTERNAL MEDICINE

## 2021-04-07 PROCEDURE — 93880 EXTRACRANIAL BILAT STUDY: CPT

## 2021-04-07 PROCEDURE — 97161 PT EVAL LOW COMPLEX 20 MIN: CPT

## 2021-04-07 ASSESSMENT — ENCOUNTER SYMPTOMS
QUALITY: ACHING
PAIN SCALE AT HIGHEST: 9
PAIN SCALE: 8
QUALITY: TIGHTNESS
PAIN SCALE AT LOWEST: 7

## 2021-04-07 NOTE — OP THERAPY EVALUATION
"  Outpatient Physical Therapy  VESTIBULAR EVALUATION    [facility] Physical Therapy [facility]  [address]  [address]  [place] [address]  Phone:  [phone]  Fax:  [phone]    Date of Evaluation: 04/07/2021    Patient: Alondra Otero  YOB: 1940  MRN: [id number]     Referring Provider: KENISHA Matos  [address]  [address]  [place] [address]   Referring Diagnosis: Vertigo [R42];Neck pain [M54.2]     Time Calculation    Start time: 1050  Stop time: 1145 Time Calculation (min): 55 minutes           Chief Complaint: Vertigo and Neck Problem    Visit Diagnoses     ICD-10-CM   1. Imbalance  R26.89   2. Dizziness and giddiness  R42         History of Present Illness:     Mechanism of injury:    Pt presents to PT with complaint of dizziness and neck pain.  States on 2/25/21, she went to lunch with her friends, came home and sat down to read the paper and had sudden onset of severe spinning.  States it was so intense that she passed out, \"I must have vomited all over,\" family called Anai (doesn't remember any details of the ambulance ride or initial ED presentation). Per the ED note:    \"MRI head was negative for acute abnormalities. Previous echo in September 2020 is unrevealing, repeat echo done here, formal reading still in process. D-dimer negative. Her symptoms have improved this morning. She has been cleared by PT/OT for discharge home. Of note, ANAI noticed ST depression prior to admission. EKG appears unchanged from previous. Patient denies chest pain, shortness of breath, and diaphoresis. She remains hemodynamically stable on room air.     Therefore, she is discharged in fair and stable condition to home with close outpatient follow-up\"    States she was stable without episodes until 4/3/21.  States she again went out to lunch with a friend, returned to read the paper and again experienced intense spinning.  This time no vomiting, but persistent spinning for about 7 " hours.  Feeling normal now except for L sided neck pain.  States this has been present for a couple months, constant but worse in the mornings, insidious onset, managing this with tylenol. 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 Hobbies: golfing, lunch with friends.     Headaches: no headaches      Ear problems:  None    Sleep disturbance:  Interrupted sleep  Symptoms:     Current symptom ratin    At best symptom ratin    At worst symptom ratin    Quality:  Aching and tightness    Progression:  Stable  Social Support:     Lives in:  One-story house    Lives with:  Alone  Treatments:     No prior treatments received    Patient goals:     Patient goals for therapy:  [place] with ADLs/IADLs, increased motion and decreased pain    Other patient goals:  Keep vertigo from returning.       Past Medical History:   Diagnosis Date   • Heart burn    • Hypertension      Past Surgical History:   Procedure Laterality Date   • RHYTIDECTOMY  10/15/2012    Performed by Nelsy Sun M.D. at [facility]ERY [facility]   • BLEPHAROPLASTY  10/15/2012    Performed by Nelsy Sun M.D. at SUR[facility]ERY [facility]   • CHOLECYSTECTOMY  2004   • HYSTERECTOMY LAPAROSCOPY       Social History     Tobacco Use   • Smoking status: Never Smoker   • Smokeless tobacco: Never Used   Substance Use Topics   • Alcohol use: Yes     Alcohol/week: 1.2 oz     Types: 2 Shots of liquor per week     Comment: 1 per week     Family and Occupational History     Socioeconomic History   • Marital status:      Spouse name: Not on file   • Number of children: Not on file   • Years of education: Not on file   • Highest education level: Not on file   Occupational History   • Not on file       Objective:    Observation:     Comments: Kyphotic, fwd head with L head tilt and R rotation.   Gait:     Assessment: difficulty with  "concurrent head rotation  Vestibulospinal Exam:     MCTSIB:         Comments: Deferred      Oculomotor Exam:         Details: No spontaneous nystagmus central gaze          Details: No spontaneous nystagmus eccentric gaze    No saccadic eye movements    Smooth pursuit present    Convergence:        Normal convergence    No skew    Comments:   Gaze holds intact with fixation blocked.   Active Range of Motion:     Active range of motion comments: Decreased c/s AROM: ext= neutral, R rotation= 30 deg, L rotation= 20 deg, flex= 2 fingers from chest    T/S generally hypomobile.  Shoulder elevation limited to 130 deg bilat  Limb Ataxia Exam:     Finger-to-Nose:         Intention tremor: none    Dysdiadochokinesia: req practice for pron/sup, but ultimately WNL.  Strength Exam:     Upper extremities within functional limits    Lower extremities within functional limits  Reflex Exam:     Alas reflex: absent    Babinski sign: absent      Deep Tendon Reflexes:         Left L4 (Patellar): normal (2+)        Right L4 (Patellar): normal (2+)  Sensation Tests:     Left Light Touch Sensation:         All left lumbar dermatomes intact      Right Light Touch Sensation:         All right lumbar dermatomes intact      Vertebrobasilar Exam:    Comments: NEG seated active test, but skewed due to limitations with c/s rotation  Vestibulo-Ocular Exam:     Comments: Unable to VOR due to neck pain and limited range.   BPPV Exam:     Comments: Unable to perform due to limited c/s ROM, no dizziness during bed mobility        Therapeutic Treatments and Modalities:     1. Manual Therapy (CPT 64857), Supine: C/S  GII-III, CTJ rotational mobs GIII, B R1 mobs GII-III, DTW B c/s paraspinals, UTs, subocc. Iso resisted rotation and SB 5\"x 5 ea    Therapeutic Treatment and Modalities Summary:   HEP: OA nod with retraction, c/s rotation in tall posture, scap clocks. HO provided.     Time-based treatments/modalities:    Physical Therapy Timed " Treatment Charges  Manual therapy minutes (CPT 90195): 10 minutes        Assessment:     Functional impairments:  Decreased range of motion/strength, pain and gait abnormality/instability    Assessment details:    Ms. Otero is an 80 y.o female who presents to PT with complaint of recent episodes of vertigo (2/25/21 and 4/3/21) and neck pain x 2 months.  Examination for the dizziness was unremarkable. Oculomotor control was intact with fixation present and blocked, balance and coordination intact, good tolerance for transitions without dizziness.  BPPV testing was skewed due to limited c/s ROM, but subjective is inconsistent with positional vertigo due to the spontaneous onset and duration of sx.  Concerning that the first episode resulted in LOC and may indicated cardiovascular or central source.      Cervical assessment does confirm severely reduced ROM, postural control and functional tolerance.  This is consistent with cervical DDD/DJD.  No evidence of radiculopathy during today's session. Skilled PT services are indicated to address the mentioned functional limitations and enhance QOL.       Prognosis: good    Goals:     Short term goals:  - Improve c/s rotation to at least 50 deg bilat  - Pt able to indep scap set  - Pt able to complete BPPV exam    Short term goal time span:  1-2 weeks    Long term goals:  - Pt able to report waking with no greater than 4/10 pain in her neck  - Pt reports DHI at less than 10%  - Pt able to work full day with no more than 2/10 pain in her neck  - Indep with HEP    Long term goal time span:  6-8 weeks  Plan:     Therapy options:  Physical therapy treatment to continue    Planned therapy interventions:  E Stim Unattended (CPT 62411), Functional Training, Self Care (CPT 24748), Hot or Cold Pack Therapy (CPT 05204), Manual Therapy (CPT 09504), Mechanical Traction (CPT 52840), Neuromuscular Re-education (CPT 04965), Therapeutic Exercise (CPT 88981) and Therapeutic Activities (CPT  50268)    Frequency:  2x week    Duration in weeks:  8    Discussed with:  Patient      Functional Assessment Used    DHI= 20%      Referring provider co-signature:  I have reviewed this plan of care and my co-signature certifies the need for services.    Certification Period: 04/07/2021 to  06/02/21    Physician Signature: ________________________________ Date: ______________

## 2021-04-08 NOTE — OP THERAPY DAILY TREATMENT
"  Outpatient Physical Therapy  DAILY TREATMENT     [facility] Physical [address].  [address]  [place] [address]  Phone:  [phone]  Fax:  [phone]    Date: 04/09/2021    Patient: Alondra Otero  YOB: 1940  MRN: [id number]     Time Calculation    Start time: 1015  Stop time: 1045 Time Calculation (min): 30 minutes         Chief Complaint: Neck Problem    Visit #: 2    SUBJECTIVE:  I feel like this is really going to help if I can just keep in moving. Reports she has been having some lightheadedness when she sits up in the morning.     OBJECTIVE:      Therapeutic Exercises (CPT 82130):     1. Supine OA nod, x 10     2. Supine OA retraction, x 10    3. Supine nod with c/s rotation to 40 deg bilat, x 10 ea    4. Seated scap clocks, x 10 ea    5. Attempted towel AA rotation: difficult to coordinate and caused trouble in the R shoulder.     Therapeutic Treatments and Modalities:     1. Manual Therapy (CPT 03771), Supine: C/S  GII-III, CTJ rotational mobs GIII, B R1 mobs GII-III, DTW B c/s paraspinals, UTs, subocc. Iso resisted rotation and SB 5\"x 5 ea    Therapeutic Treatment and Modalities Summary:   Brief BPPV assessment: modified hallpike (SL) and roll NEG bilaterally.      Time-based treatments/modalities:    Physical Therapy Timed Treatment Charges  Manual therapy minutes (CPT 67282): 15 minutes  Therapeutic exercise minutes (CPT 62778): 15 minutes    ASSESSMENT:   Response to treatment: NEG for BPPV. Did show some carryover of improved ROM from last visit.  Improving mobility through CTJ and L R1 with tolerance for increased pressure during manual.       PLAN/RECOMMENDATIONS:   Plan for treatment: therapy treatment to continue next visit.  Planned interventions for next visit: continue with current treatment. Continue manual for improved ROM. Rows, pullbacks.        "

## 2021-04-09 ENCOUNTER — PHYSICAL THERAPY (OUTPATIENT)
Dept: PHYSICAL THERAPY | Facility: REHABILITATION | Age: 81
End: 2021-04-09
Attending: NURSE PRACTITIONER
Payer: MEDICARE

## 2021-04-09 DIAGNOSIS — R42 DIZZINESS AND GIDDINESS: ICD-10-CM

## 2021-04-09 DIAGNOSIS — R26.89 IMBALANCE: ICD-10-CM

## 2021-04-09 PROCEDURE — 97110 THERAPEUTIC EXERCISES: CPT

## 2021-04-09 PROCEDURE — 97140 MANUAL THERAPY 1/> REGIONS: CPT

## 2021-04-12 ENCOUNTER — PHYSICAL THERAPY (OUTPATIENT)
Dept: PHYSICAL THERAPY | Facility: REHABILITATION | Age: 81
End: 2021-04-12
Attending: NURSE PRACTITIONER
Payer: MEDICARE

## 2021-04-12 DIAGNOSIS — R26.89 IMBALANCE: ICD-10-CM

## 2021-04-12 DIAGNOSIS — R42 DIZZINESS AND GIDDINESS: ICD-10-CM

## 2021-04-12 PROCEDURE — 97140 MANUAL THERAPY 1/> REGIONS: CPT

## 2021-04-12 PROCEDURE — 97014 ELECTRIC STIMULATION THERAPY: CPT

## 2021-04-12 PROCEDURE — 97110 THERAPEUTIC EXERCISES: CPT

## 2021-04-12 NOTE — OP THERAPY DAILY TREATMENT
"  Outpatient Physical Therapy  DAILY TREATMENT     [facility] Physical Therapy [address]  [address]  [place] [address]  Phone:  [phone]  Fax:  [phone]    Date: 04/12/2021    Patient: Alondra Otero  YOB: 1940  MRN: [id number]     Time Calculation    Start time: 0845  Stop time: 0940 Time Calculation (min): 55 minutes         Chief Complaint: Vertigo    Visit #: 3    SUBJECTIVE:  There was one day I thought it was getting better, but I'm taking more advil than i'd like.     OBJECTIVE:      Therapeutic Exercises (CPT 87214):     1. Supine OA nod, x 10     2. Supine OA retraction, x 10    3. Supine dowel 'bench press', x 10 , too painful to take this overhead    4. Seated postural re-ed with mirror feedback, x 3 min    5. Seated scap planes: elev/depression, prot/ret, x 20 ea with tactile cuing    Therapeutic Treatments and Modalities:     1. Manual Therapy (CPT 97086), Supine: C/S  GII-III, CTJ rotational mobs GIII, B R1 mobs GII-III, DTW B c/s paraspinals, UTs, subocc. Iso resisted rotation and SB 5\"x 5 ea    Time-based treatments/modalities:    Physical Therapy Timed Treatment Charges  Manual therapy minutes (CPT 60027): 15 minutes  Therapeutic exercise minutes (CPT 03189): 15 minutes    ASSESSMENT:   Response to treatment: Poor NM control of scapular patterns, UT dominant (particularly on the R due to OA of that shoulder). Benefits from tactile and visual feedback to enhance performance. Trial of IFC today as alternative for pain modulation.     PLAN/RECOMMENDATIONS:   Plan for treatment: therapy treatment to continue next visit.  Planned interventions for next visit: continue with current treatment. Manual as needed to restore mobility, continued postural re-ed and strengthening to maximize carryover, TENS?       "

## 2021-04-14 ENCOUNTER — PHYSICAL THERAPY (OUTPATIENT)
Dept: PHYSICAL THERAPY | Facility: REHABILITATION | Age: 81
End: 2021-04-14
Attending: NURSE PRACTITIONER
Payer: MEDICARE

## 2021-04-14 DIAGNOSIS — R42 DIZZINESS AND GIDDINESS: ICD-10-CM

## 2021-04-14 DIAGNOSIS — R26.89 IMBALANCE: ICD-10-CM

## 2021-04-14 PROCEDURE — 97110 THERAPEUTIC EXERCISES: CPT

## 2021-04-14 PROCEDURE — 97140 MANUAL THERAPY 1/> REGIONS: CPT

## 2021-04-14 PROCEDURE — 97014 ELECTRIC STIMULATION THERAPY: CPT

## 2021-04-14 NOTE — OP THERAPY DAILY TREATMENT
"  Outpatient Physical Therapy  DAILY TREATMENT     [facility] Physical Therapy [address]  [address]  [place] [address]  Phone:  [phone]  Fax:  [phone]    Date: 04/14/2021    Patient: Alondra Otero  YOB: 1940  MRN: [id number]     Time Calculation    Start time: 0915  Stop time: 1005 Time Calculation (min): 50 minutes         Chief Complaint: Neck Problem    Visit #: 4    SUBJECTIVE:  I'm catching myself in slouched positions and trying to correct.     OBJECTIVE:      Therapeutic Exercises (CPT 05123):     1. Supine OA nod, x 10     2. Supine OA retraction, x 10    3. Supine manually guided scapular planes-> resisted scap depression-> on wall in front of mirror, x 2 min in ea position    4. [place], L0 x 15    5. Rows, L1 x 20    Therapeutic Treatments and Modalities:     1. Manual Therapy (CPT 64377), Supine: C/S  GII-III, CTJ rotational mobs GIII, B R1 mobs GII-III, DTW B c/s paraspinals, UTs, subocc. Iso resisted rotation and SB 5\"x 5 ea    2. E Stim Unattended (CPT 99254), IFC and MH to the c/s in supine x 15 min    Time-based treatments/modalities:    Physical Therapy Timed Treatment Charges  Manual therapy minutes (CPT 13762): 15 minutes  Therapeutic exercise minutes (CPT 76444): 15 minutes    ASSESSMENT:   Response to treatment: Continues to have difficulty isolating scap stabilizers, habitual bracing through c/s ext and UT recruitment. Improves with cuing, but question indep with performance at home.  Asked to perform in front of a mirror.    PLAN/RECOMMENDATIONS:   Plan for treatment: therapy treatment to continue next visit.  Planned interventions for next visit: continue with current treatment. Continue to develop indep c/s stab patterns and free AROm       "

## 2021-04-20 ENCOUNTER — PHYSICAL THERAPY (OUTPATIENT)
Dept: PHYSICAL THERAPY | Facility: REHABILITATION | Age: 81
End: 2021-04-20
Attending: NURSE PRACTITIONER
Payer: MEDICARE

## 2021-04-20 DIAGNOSIS — R26.89 IMBALANCE: ICD-10-CM

## 2021-04-20 DIAGNOSIS — R42 DIZZINESS AND GIDDINESS: ICD-10-CM

## 2021-04-20 PROCEDURE — 97140 MANUAL THERAPY 1/> REGIONS: CPT

## 2021-04-20 PROCEDURE — 97110 THERAPEUTIC EXERCISES: CPT

## 2021-04-20 NOTE — OP THERAPY DAILY TREATMENT
"  Outpatient Physical Therapy  DAILY TREATMENT     [facility] Physical Therapy [address]  [address]  [place] [address]  Phone:  [phone]  Fax:  [phone]    Date: 04/20/2021    Patient: Alondra Otero  YOB: 1940  MRN: [id number]     Time Calculation    Start time: 0811  Stop time: 0859 Time Calculation (min): 48 minutes         Chief Complaint: Neck Problem    Visit #: 5    SUBJECTIVE:  I felt better until this morning. I'm not sure what I did, but I have pain all the way into my head.    OBJECTIVE:      Therapeutic Exercises (CPT 06180):     1. Self SOR with peanut ball, x 3 min    2. Supine OA retraction, x 10    3. Supine c/s rotation, x 20    4. Wall scap retractions with mirror feedback, x 2 min    5. Wall alt GH flex (R only to 50 deg) , x 15 ea, focus on R scap depression    6. Rows, L1 x 20     7. [place], L0 x 20    Therapeutic Treatments and Modalities:     1. Manual Therapy (CPT 11068), Supine: C/S  GII-III, CTJ rotational mobs GIII, B R1 mobs GII-III, DTW B c/s paraspinals, UTs, subocc. Iso resisted rotation and SB 5\"x 5 ea    2. E Stim Unattended (CPT 60157), declined today    Time-based treatments/modalities:    Physical Therapy Timed Treatment Charges  Manual therapy minutes (CPT 81521): 20 minutes  Therapeutic exercise minutes (CPT 88612): 25 minutes    ASSESSMENT:   Response to treatment: Improving c/s rotation, now consistently at least 40 deg.  Able to rotation without the 'catch' when manually corrected to chin tuck position to unload the facets. Less reliant on manual correction of scap setting and able to now perform with mirror feedback only.  Reverts during rows, but may be able to add to HEP next visit if she shows more control.     PLAN/RECOMMENDATIONS:   Plan for treatment: therapy treatment to continue next visit.  Planned interventions for next visit: continue with current treatment.       "

## 2021-04-23 ENCOUNTER — PHYSICAL THERAPY (OUTPATIENT)
Dept: PHYSICAL THERAPY | Facility: REHABILITATION | Age: 81
End: 2021-04-23
Attending: NURSE PRACTITIONER
Payer: MEDICARE

## 2021-04-23 DIAGNOSIS — R42 DIZZINESS AND GIDDINESS: ICD-10-CM

## 2021-04-23 DIAGNOSIS — R26.89 IMBALANCE: ICD-10-CM

## 2021-04-23 PROCEDURE — 97110 THERAPEUTIC EXERCISES: CPT

## 2021-04-23 PROCEDURE — 97140 MANUAL THERAPY 1/> REGIONS: CPT

## 2021-04-23 NOTE — OP THERAPY DAILY TREATMENT
"  Outpatient Physical Therapy  DAILY TREATMENT     [facility] Physical Therapy [address]  [address]  [place] [address]  Phone:  [phone]  Fax:  [phone]    Date: 04/23/2021    Patient: Alondra Otero  YOB: 1940  MRN: [id number]     Time Calculation    Start time: 0810  Stop time: 0840 Time Calculation (min): 30 minutes         Chief Complaint: Neck Problem    Visit #: 6    SUBJECTIVE:  I'm starting to notice the pain be less intense.     OBJECTIVE:      Therapeutic Exercises (CPT 28797):     2. Supine OA retraction, x 10    3. Supine c/s rotation, x 20    4. Supine dowel bench press with manual cuing for c/s position, x 10, notable crepitus in the R GH    5. Supine dowel GH IR/ER, x 30    6. Ball circles, on the floor (CW/CCW) x 20 ea    7. [place], L0 x 20    8. Ball rolls on the table for GH flex to 80 deg, x 20, cuing approx every 3rd rep to correct scapular positioning.     20. UPOC 6/2/21    Therapeutic Treatments and Modalities:     1. Manual Therapy (CPT 38247), Supine: C/S  GII-III, CTJ rotational mobs GIII, B R1 mobs GII-III, DTW B c/s paraspinals, UTs, subocc. Iso resisted rotation and SB 5\"x 5 ea    2. E Stim Unattended (CPT 75120), declined today, pain level low    Time-based treatments/modalities:    Physical Therapy Timed Treatment Charges  Manual therapy minutes (CPT 70114): 10 minutes  Therapeutic exercise minutes (CPT 90147): 20 minutes    ASSESSMENT:   Response to treatment: C/S rotation pain free through 45 deg bilat after manual. Improved indep with scap setting allowing progression to free space.  Needs cuing to ID scap elevation, but can correct when cued.     PLAN/RECOMMENDATIONS:   Plan for treatment: therapy treatment to continue next visit.  Planned interventions for next visit: continue with current treatment. Develop GH and cervical AROM, scapular strength and stab       "

## 2021-04-28 ENCOUNTER — PHYSICAL THERAPY (OUTPATIENT)
Dept: PHYSICAL THERAPY | Facility: REHABILITATION | Age: 81
End: 2021-04-28
Attending: NURSE PRACTITIONER
Payer: MEDICARE

## 2021-04-28 DIAGNOSIS — R42 DIZZINESS AND GIDDINESS: ICD-10-CM

## 2021-04-28 DIAGNOSIS — R26.89 IMBALANCE: ICD-10-CM

## 2021-04-28 PROCEDURE — 97110 THERAPEUTIC EXERCISES: CPT

## 2021-04-28 PROCEDURE — 97140 MANUAL THERAPY 1/> REGIONS: CPT

## 2021-04-28 NOTE — OP THERAPY DAILY TREATMENT
"  Outpatient Physical Therapy  DAILY TREATMENT     [facility] Physical [address].  [address]  [place] [address]  Phone:  [phone]  Fax:  [phone]    Date: 04/28/2021    Patient: Alondra Otero  YOB: 1940  MRN: [id number]     Time Calculation    Start time: 0945  Stop time: 1015 Time Calculation (min): 30 minutes         Chief Complaint: Neck Problem    Visit #: 7    SUBJECTIVE:  I'm miserable.  The pain in my neck has been terrible.  Had 2nd COVID shot 2 days ago.  Yesterday felt fatigued and achy    OBJECTIVE:      Therapeutic Exercises (CPT 14902):     2. Supine OA retraction, x 10    3. Supine c/s rotation, x 20    4. Seated c/s rotation, x 15 ea, manually guided    5. Supine dowel GH IR/ER, x 30    6. Ball circles, on the floor (CW/CCW) x 20 ea    20. UPOC 6/2/21    Therapeutic Treatments and Modalities:     1. Manual Therapy (CPT 31038), Supine: C/S  GII-III, CTJ rotational mobs GIII, B R1 mobs GII-III, DTW B c/s paraspinals, UTs, subocc. Iso resisted rotation and SB 5\"x 5 ea    Time-based treatments/modalities:    Physical Therapy Timed Treatment Charges  Manual therapy minutes (CPT 57341): 15 minutes  Therapeutic exercise minutes (CPT 03327): 15 minutes    ASSESSMENT:   Response to treatment: Question whether her increased pain is related to response to vaccine.  Either way, likely to benefit from an injection to stabilize pain levels and enhance participation with HEP/ROM. Supine c/s rotation improves to 50 deg, but still with trunk rotation compensation for head turn in upright.  Has pain free available range to 30 deg bilaterally when manually guided, but poor functional use likely relates to the persistence of the stiffness. Recheck on Friday, consider ongoing POC.     PLAN/RECOMMENDATIONS:   Plan for treatment: therapy treatment to continue next visit.  Planned interventions for next visit: continue with current treatment.       "

## 2021-04-30 ENCOUNTER — PHYSICAL THERAPY (OUTPATIENT)
Dept: PHYSICAL THERAPY | Facility: REHABILITATION | Age: 81
End: 2021-04-30
Attending: NURSE PRACTITIONER
Payer: MEDICARE

## 2021-04-30 DIAGNOSIS — R42 DIZZINESS AND GIDDINESS: ICD-10-CM

## 2021-04-30 DIAGNOSIS — R26.89 IMBALANCE: ICD-10-CM

## 2021-04-30 PROCEDURE — 97110 THERAPEUTIC EXERCISES: CPT

## 2021-04-30 PROCEDURE — 97140 MANUAL THERAPY 1/> REGIONS: CPT

## 2021-04-30 NOTE — OP THERAPY DISCHARGE SUMMARY
Outpatient Physical Therapy  DISCHARGE SUMMARY NOTE      [facility] Physical Therapy [facility]  [address]  [address]  [place] [address]  Phone:  [phone]  Fax:  [phone]    Date of Visit: 04/30/2021    Patient: Alondra Otero  YOB: 1940  MRN: [id number]     Referring Provider: KENISHA Matos  [address]  [address]  [place] [address]   Referring Diagnosis Vertigo [R42]         Functional Assessment Used        Your patient is being discharged from Physical Therapy with the following comments:   · Goals partially met    Comments:  Ms. Otero was seen for 8 PT sessions treating her dizziness (which had nearly resolved at eval) and neck pain, which was limiting her functional mobility and balance.  Tx included manual therapy, use of IFC, postural re-education and progressive strength training. Overall, she progressed well.  Cervical AROM improved from 10 deg bilat to 40 deg.  Improved scap stability and indep postural corrections.  There is still pain at end range and pain at rest fluctuates.  She is indep with her HEP, but may benefit from further intervention (injection?) for pain management.     Recommendations:  D/C to HEP due to max benefit from PT    Maria A Mccarthy, PT, DPT    Date: 4/30/2021

## 2021-04-30 NOTE — OP THERAPY DAILY TREATMENT
"  Outpatient Physical Therapy  DAILY TREATMENT     [facility] Physical Therapy [address].  [address]  [place] [address]  Phone:  [phone]  Fax:  [phone]    Date: 04/30/2021    Patient: Alondra Otero  YOB: 1940  MRN: [id number]     Time Calculation    Start time: 1015  Stop time: 1043 Time Calculation (min): 28 minutes         Chief Complaint: Neck Problem    Visit #: 8    SUBJECTIVE:  Called her MD to discuss injections, made appt for May 18th.  Did have some laser tx on the neck, which helped but returned once she got up and moving.      OBJECTIVE:      Therapeutic Exercises (CPT 54045):     2. Supine OA retraction, x 10    3. Supine c/s rotation, x 20    4. Seated c/s rotation, x 15 ea, indep with mirror feedback    5. Supine dowel GH IR/ER, x 30    6. Ball circles, on the floor (CW/CCW) x 20 ea    20. UPOC 6/2/21    Therapeutic Treatments and Modalities:     1. Manual Therapy (CPT 35614), Supine: C/S  GII-III, CTJ rotational mobs GIII, B R1 mobs GII-III, DTW B c/s paraspinals, UTs, subocc. Iso resisted rotation and SB 5\"x 5 ea    Time-based treatments/modalities:    Physical Therapy Timed Treatment Charges  Manual therapy minutes (CPT 78267): 15 minutes  Therapeutic exercise minutes (CPT 47802): 13 minutes    ASSESSMENT:   Response to treatment: See d/c note    PLAN/RECOMMENDATIONS:   Plan for treatment: no further treatment needed.     "

## 2021-12-07 NOTE — PROGRESS NOTES
Chief Complaint   Patient presents with   • Atrial Fibrillation     F/V Dx:  PAF (paroxysmal atrial fibrillation) ([facility])   • Coronary Artery Disease     F/V Dx: Coronary artery disease involving native coronary artery of native heart without angina pectoris       Subjective:   Alondra Otero is a 79 y.o. female who presents today for follow up.    Patient was last seen by myself on 3/31/2021.    Past medical history significant for hypertension, CAD S/P revascularization of the RCA (NILSA to distal RCA, BMS to ostial RCA and PTCA of PDA) on 12/11/2019 which was complicated by a contained a sending aortic dissection and PAF.     Other past medical history significant for AI, HTN and HLD.     Today patient states that she is feeling overall well from cardiac perspective. She has been struggling with some neck pain which she has had numerous injections for in April. Patient has stopped taking her Atorvastatin, she thought it was for pain and was not interested in taking any pain medication.     Past Medical History:   Diagnosis Date   • Heart burn    • Hypertension      Past Surgical History:   Procedure Laterality Date   • RHYTIDECTOMY  10/15/2012    Performed by Nelsy Sun M.D. at SURGERY [facility]   • BLEPHAROPLASTY  10/15/2012    Performed by Nelsy Sun M.D. at SURGERY [facility]   • CHOLECYSTECTOMY  2004   • HYSTERECTOMY LAPAROSCOPY  1975     History reviewed. No pertinent family history.  Social History     Socioeconomic History   • Marital status:      Spouse name: Not on file   • Number of children: Not on file   • Years of education: Not on file   • Highest education level: Not on file   Occupational History   • Not on file   Tobacco Use   • Smoking status: Never Smoker   • Smokeless tobacco: Never Used   Vaping Use   • Vaping Use: Never used   Substance and Sexual Activity   • Alcohol use: Yes     Alcohol/week: 1.2 oz     Types: 2 Shots of liquor per week     Comment:  1 per week   • Drug use: No   • Sexual activity: Not Currently   Other Topics Concern   • Not on file   Social History Narrative   • Not on file     Social Determinants of Health     Financial Resource Strain:    • Difficulty of Paying Living Expenses: Not on file   Food Insecurity:    • Worried About Running Out of Food in the Last Year: Not on file   • Ran Out of Food in the Last Year: Not on file   Transportation Needs:    • Lack of Transportation (Medical): Not on file   • Lack of Transportation (Non-Medical): Not on file   Physical Activity:    • Days of Exercise per Week: Not on file   • Minutes of Exercise per Session: Not on file   Stress:    • Feeling of Stress : Not on file   Social Connections:    • Frequency of Communication with Friends and Family: Not on file   • Frequency of Social Gatherings with Friends and Family: Not on file   • Attends Religion Services: Not on file   • Active Member of Clubs or Organizations: Not on file   • Attends Club or Organization Meetings: Not on file   • Marital Status: Not on file   Intimate Partner Violence:    • Fear of Current or Ex-Partner: Not on file   • Emotionally Abused: Not on file   • Physically Abused: Not on file   • Sexually Abused: Not on file   Housing Stability:    • Unable to Pay for Housing in the Last Year: Not on file   • Number of Places Lived in the Last Year: Not on file   • Unstable Housing in the Last Year: Not on file     Allergies   Allergen Reactions   • Sulfa Drugs Vomiting     Outpatient Encounter Medications as of 12/13/2021   Medication Sig Dispense Refill   • atorvastatin (LIPITOR) 40 MG Tab Take 1 Tablet by mouth every evening. 90 Tablet 3   • atenolol (TENORMIN) 25 MG Tab Take 0.5 Tablets by mouth every day. 45 Tablet 3   • aspirin (ASA) 81 MG Chew Tab chewable tablet Chew 1 tablet every day. 100 tablet 3   • [DISCONTINUED] atenolol (TENORMIN) 25 MG Tab Take 0.5 Tablets by mouth every day. 45 Tablet 0   • [DISCONTINUED] atorvastatin  "(LIPITOR) 40 MG Tab Take 1 tablet by mouth every evening. (Patient not taking: Reported on 12/13/2021) 90 tablet 3   • [DISCONTINUED] meclizine (ANTIVERT) 12.5 MG Tab Take 1 tablet by mouth 3 times a day as needed. (Patient not taking: Reported on 12/13/2021) 30 tablet 0   • acetaminophen (TYLENOL) 500 MG Tab Take 1,000 mg by mouth 1 time a day as needed (Shoulder Pain). (Patient not taking: Reported on 12/13/2021)       No facility-administered encounter medications on file as of 12/13/2021.     Review of Systems   Constitutional: Negative for malaise/fatigue and weight loss.   Respiratory: Negative for shortness of breath.    Cardiovascular: Negative for chest pain, palpitations, orthopnea, claudication, leg swelling and PND.   Musculoskeletal: Positive for neck pain.   Neurological: Negative for dizziness and weakness.   All other systems reviewed and are negative.       Objective:   /84 (BP Location: Left arm, Patient Position: Sitting, BP Cuff Size: Adult)   Pulse 64   Resp 14   Ht 1.575 m (5' 2\")   Wt 65.6 kg (144 lb 9.6 oz)   SpO2 95%   BMI 26.45 kg/m²     Physical Exam  Constitutional:       General: She is not in acute distress.     Appearance: She is well-developed.   HENT:      Head: Normocephalic.   Eyes:      Extraocular Movements: Extraocular movements intact.   Neck:      Vascular: No carotid bruit or JVD.   Cardiovascular:      Rate and Rhythm: Normal rate and regular rhythm.      Heart sounds: Normal heart sounds. No murmur heard.      Pulmonary:      Effort: No respiratory distress.      Breath sounds: Normal breath sounds.   Abdominal:      Palpations: Abdomen is soft.      Tenderness: There is no abdominal tenderness.   Musculoskeletal:      Right lower leg: No edema.      Left lower leg: No edema.   Skin:     General: Skin is warm and dry.   Neurological:      Mental Status: She is alert and oriented to person, place, and time.   Psychiatric:         Mood and Affect: Mood normal.    "      Behavior: Behavior normal.         Thought Content: Thought content normal.          Thoracoabdominal CT-CTA 12/17/19:  1.  Intramural hematoma of the ascending aorta has decreased in size and density. It extends approximately [facility] through the ascending aorta. No dissection flap. No aortic aneurysm.  2.  No acute abnormality in the chest, abdomen or pelvis.  3.  Stable 2.2 cm left thyroid nodule can be further evaluated with outpatient ultrasound.  4.  Colonic diverticulosis.     Echocardiogram 12/11/19:  CONCLUSIONS  No prior study is available for comparison.   Technically difficult study.   Normal left ventricular chamber size.  Inferior wall hypokinesis.  Left ventricular systolic function is normal.  Left ventricular ejection fraction is visually estimated to be 55-60%.  Normal left ventricular wall thickness.  Normal pericardium without effusion.    Echocardiogram 9/12/2020:  CONCLUSIONS  Normal left ventricular chamber size.  Left ventricular ejection fraction is visually estimated to be 70%.  Mild to moderate aortic insufficiency.  Normal right ventricular size and systolic function.       Cardiac Catheterization 12/11/19:  Coronary artery disease     This is right dominant system.     Left main is large and without flow limiting disease.  It bifurcated into left anterior descending and left circumflex artery.      Left anterior descending artery is large caliber vessel.  It gives rises to several small  diagonal branches. Limited images was obtained but there is no signficant disease in the left anterior descending artery or its major branches seen.  The antegrade flow is normal.     Left circumflex artery is large in caliber. Limited image of the LCX was also obtained.  It gives rise to several obtuse marginal branches.  There is no significant disease in the LCX system.  The antegrade flow is normal.     Right coronary artery (RCA) is large caliber.   At debating beginning the case, the RCA was  occluded distally at the crux after giving rise to several small acute marginal branches. There was also large amount of thrombus causing subtotal occlusion at the ostium of the posterior descending artery and the posterolateral branch was not visualized.     After intervention, there was no significant residual stenosis in the distal RCA or the posterolateral branch with residual thrombus at the ostium of the PDA with ALEX II flow.      Post-operative Diagnosis:   1.  Acute inferior ST elevation microinfarction secondary to total occlusion of distal right coronary artery  2.  Second-degree AV block Mobitz type I  3.  Hypokinesis of the basal to mid inferior wall with preserved overall LV systolic function  4.  Status post stenting of the ostium and distal right coronary artery with 4 x 23 mm bare-metal stent and 2.25 x 18 mm [place] drug eluting stent respectively     Assessment:     1. SVT (supraventricular tachycardia) (HCC)  atenolol (TENORMIN) 25 MG Tab   2. PAF (paroxysmal atrial fibrillation) (HCC)  atenolol (TENORMIN) 25 MG Tab   3. Essential hypertension  atenolol (TENORMIN) 25 MG Tab   4. Coronary artery disease of native heart with stable angina pectoris, unspecified vessel or lesion type (HCC)     5. Mixed hyperlipidemia  Comp Metabolic Panel    Lipid Profile   6. Antiplatelet or antithrombotic long-term use  CBC WITHOUT DIFFERENTIAL   7. Osteoarthritis of cervical spine, unspecified spinal osteoarthritis complication status  Referral to Massage Therapy   8. S/P right coronary artery (RCA) stent placement     9. Nonrheumatic aortic valve insufficiency         Medical Decision Making:  Today's Assessment / Status / Plan:     CAD S/P Stemi:  - S/P revascularization of the RCA (NILSA to distal RCA, BMS to ostial RCA and PTCA of PDA) on 12/11/2019.  - TTE in February showed preserved LVEF with no WMA.   - Continues to deny angina or ELLSWORTH.   - Continue ASA 81 mg daily, atenolol 12.5 mg BID and statin therapy.   -  CBC and CMP ordered.     HLD:  - Discussed rationale for Atorvastatin, patient is in agreement to resume Atorvastatin 40 mg daily.   - Repeat lipid panel in 2-3 months.     Aortic Insufficiency:  - Remains mild per TTE in February, patient remains asymptomatic.   - Reinforced signs and symptoms of progression reviewed with patient, encouraged her to contact our office should they develop.     PAF:  - Originally observed initially after STEMI and reverted on IV amio.   -13 day biotel showed 2 brief episodes of irregular SVT concerning for PAF.   - Rate remains regular upon auscultation today.  - Patient continues to decline OAC at this time.   - Continue ASA and atenolol as above.       Mild Carotid Artery Disease:  - Carotid US showed very mild bilateral plaque with tortuous proximal internal carotid arteries.   - On ASA and statin therapy.     Patient will follow-up with myself in 12 months. Encouraged patient to contact our office should any questions or concerns arise in the meantime. Patient understands and agrees with the plan of care.     Future Appointments   Date Time Provider Department Center   4/7/2021  1:45 PM [facility] EXAM 6 [facility]   10/4/2021  7:45 AM NARGIS Matos. CB None     Please note that this dictation was created using voice recognition software. I have made every reasonable attempt to correct obvious errors, but I expect that there are errors of grammar and possibly content I did not discover before finalizing the note.

## 2021-12-13 ENCOUNTER — OFFICE VISIT (OUTPATIENT)
Dept: CARDIOLOGY | Facility: MEDICAL CENTER | Age: 81
End: 2021-12-13
Payer: MEDICARE

## 2021-12-13 VITALS
DIASTOLIC BLOOD PRESSURE: 84 MMHG | WEIGHT: 144.6 LBS | BODY MASS INDEX: 26.61 KG/M2 | SYSTOLIC BLOOD PRESSURE: 132 MMHG | HEIGHT: 62 IN | RESPIRATION RATE: 14 BRPM | HEART RATE: 64 BPM | OXYGEN SATURATION: 95 %

## 2021-12-13 DIAGNOSIS — I25.118 CORONARY ARTERY DISEASE OF NATIVE HEART WITH STABLE ANGINA PECTORIS, UNSPECIFIED VESSEL OR LESION TYPE (HCC): ICD-10-CM

## 2021-12-13 DIAGNOSIS — E78.2 MIXED HYPERLIPIDEMIA: ICD-10-CM

## 2021-12-13 DIAGNOSIS — I47.10 SVT (SUPRAVENTRICULAR TACHYCARDIA) (HCC): ICD-10-CM

## 2021-12-13 DIAGNOSIS — Z95.5 S/P RIGHT CORONARY ARTERY (RCA) STENT PLACEMENT: ICD-10-CM

## 2021-12-13 DIAGNOSIS — M47.812 OSTEOARTHRITIS OF CERVICAL SPINE, UNSPECIFIED SPINAL OSTEOARTHRITIS COMPLICATION STATUS: ICD-10-CM

## 2021-12-13 DIAGNOSIS — Z79.02 ANTIPLATELET OR ANTITHROMBOTIC LONG-TERM USE: ICD-10-CM

## 2021-12-13 DIAGNOSIS — I35.1 NONRHEUMATIC AORTIC VALVE INSUFFICIENCY: ICD-10-CM

## 2021-12-13 DIAGNOSIS — I10 ESSENTIAL HYPERTENSION: ICD-10-CM

## 2021-12-13 DIAGNOSIS — I48.0 PAF (PAROXYSMAL ATRIAL FIBRILLATION) (HCC): ICD-10-CM

## 2021-12-13 PROCEDURE — 99214 OFFICE O/P EST MOD 30 MIN: CPT | Performed by: NURSE PRACTITIONER

## 2021-12-13 RX ORDER — ATORVASTATIN CALCIUM 40 MG/1
40 TABLET, FILM COATED ORAL EVERY EVENING
Qty: 90 TABLET | Refills: 3 | Status: SHIPPED | OUTPATIENT
Start: 2021-12-13

## 2021-12-13 RX ORDER — ATENOLOL 25 MG/1
12.5 TABLET ORAL DAILY
Qty: 45 TABLET | Refills: 3 | Status: SHIPPED | OUTPATIENT
Start: 2021-12-13 | End: 2022-10-14

## 2021-12-13 ASSESSMENT — ENCOUNTER SYMPTOMS
WEIGHT LOSS: 0
SHORTNESS OF BREATH: 0
WEAKNESS: 0
CLAUDICATION: 0
PND: 0
DIZZINESS: 0
NECK PAIN: 1
PALPITATIONS: 0
ORTHOPNEA: 0

## 2021-12-13 ASSESSMENT — FIBROSIS 4 INDEX: FIB4 SCORE: 1.79

## 2022-11-11 ENCOUNTER — PATIENT MESSAGE (OUTPATIENT)
Dept: HEALTH INFORMATION MANAGEMENT | Facility: OTHER | Age: 82
End: 2022-11-11

## 2022-12-06 NOTE — TELEPHONE ENCOUNTER
[facility] Six-Month Telephone Assessment    6 month telephone assessment completed on 11-29-22.    ER visits: No  Hospitalizations: No  TCU stays: No  Significant health status changes: no  Falls/Injuries: No  ADL/IADL changes: No  Changes in services: No    Caregiver Assessment follow up:  n/a    Goals: See POC in chart for goal progress documentation.       Will see member in 6 months for an annual health risk assessment.   Encouraged member to call [facility] with any questions or concerns in the meantime.     Bisi Perdomo RN  [facility]  [phone]          JS    Patient son, lonny, called to advise the Pt may not be taking any of their medication and not complying with the monitor. Please call Lonny back at [phone].    Thank you,  Effie SANDOVAL

## 2023-08-23 ENCOUNTER — TELEPHONE (OUTPATIENT)
Dept: HEALTH INFORMATION MANAGEMENT | Facility: OTHER | Age: 83
End: 2023-08-23
Payer: MEDICARE

## 2024-04-07 NOTE — LETTER
[facility]  [address] [facility] [address]     January 23, 2020    Patient: Elvie Otero   YOB: 1940   Date of Visit: 1/21/2020       To Whom It May Concern:    Elvie Otero was seen and treated in our department on 1//2020.     Sincerely,           DOMENICA Nuñez                No

## 2025-01-21 NOTE — PROGRESS NOTES
Alondra Otero attended educational workshop titled Improved Performance from 3:30-4:30PM.  Patient received handouts on material covered and all questions were answered.    BRIEF NOTE    Subjective/Objective:  Patient is new to me as PCP, as Dr. Vega is leaving the practice in January.     Patient called office requesting refill of Lisinopril, Norvasc, Metoprolol.     Assessment/Plan:  1. Benign essential hypertension  - lisinopril 40 mg tablet; Take 1 tablet (40 mg) by mouth once daily.  Dispense: 90 tablet; Refill: 0  - amLODIPine (Norvasc) 10 mg tablet; Take 1 tablet (10 mg) by mouth once daily.  Dispense: 90 tablet; Refill: 0  - metoprolol succinate XL (Toprol-XL) 100 mg 24 hr tablet; Take 1 tablet (100 mg) by mouth once daily.  Dispense: 90 tablet; Refill: 0      No problem-specific Assessment & Plan notes found for this encounter.        Lurdes Pelletier DO, MSEd  [facility]  Office: [phone]  1/21/2025 5:12 PM